# Patient Record
Sex: MALE | Race: WHITE | NOT HISPANIC OR LATINO | Employment: OTHER | URBAN - METROPOLITAN AREA
[De-identification: names, ages, dates, MRNs, and addresses within clinical notes are randomized per-mention and may not be internally consistent; named-entity substitution may affect disease eponyms.]

---

## 2017-02-28 ENCOUNTER — TELEPHONE (OUTPATIENT)
Dept: OTHER | Facility: CLINIC | Age: 67
End: 2017-02-28

## 2017-04-18 ENCOUNTER — OFFICE VISIT (OUTPATIENT)
Dept: FAMILY MEDICINE | Facility: CLINIC | Age: 67
End: 2017-04-18
Payer: COMMERCIAL

## 2017-04-18 VITALS
TEMPERATURE: 98.3 F | BODY MASS INDEX: 29.18 KG/M2 | HEIGHT: 66 IN | WEIGHT: 181.6 LBS | SYSTOLIC BLOOD PRESSURE: 125 MMHG | DIASTOLIC BLOOD PRESSURE: 80 MMHG | HEART RATE: 76 BPM

## 2017-04-18 DIAGNOSIS — L57.0 AK (ACTINIC KERATOSIS): ICD-10-CM

## 2017-04-18 DIAGNOSIS — I10 HYPERTENSION GOAL BP (BLOOD PRESSURE) < 140/90: Primary | ICD-10-CM

## 2017-04-18 PROBLEM — Z13.6 CARDIOVASCULAR SCREENING; LDL GOAL LESS THAN 160: Status: ACTIVE | Noted: 2017-04-18

## 2017-04-18 PROCEDURE — 99213 OFFICE O/P EST LOW 20 MIN: CPT | Mod: 25 | Performed by: FAMILY MEDICINE

## 2017-04-18 PROCEDURE — 17000 DESTRUCT PREMALG LESION: CPT | Performed by: FAMILY MEDICINE

## 2017-04-18 RX ORDER — LOSARTAN POTASSIUM 25 MG/1
25 TABLET ORAL DAILY
COMMUNITY
End: 2017-04-18

## 2017-04-18 RX ORDER — LOSARTAN POTASSIUM 25 MG/1
25 TABLET ORAL DAILY
Qty: 90 TABLET | Refills: 3 | Status: SHIPPED | OUTPATIENT
Start: 2017-04-18 | End: 2018-05-22

## 2017-04-18 ASSESSMENT — PAIN SCALES - GENERAL: PAINLEVEL: NO PAIN (0)

## 2017-04-18 NOTE — PROGRESS NOTES
SUBJECTIVE:                                                    Jaime Saucedo is a 66 year old male who presents to clinic today for the following health issues:    He has been doing well with losartan 25 mg daily.    He has a scaly small lesion on the left nose he wants checked.      Hypertension Follow-up      Outpatient blood pressures are not being checked.    Low Salt Diet: not monitoring salt       Amount of exercise or physical activity: None    Problems taking medications regularly: No    Medication side effects: none    Diet: regular (no restrictions) and breakfast skipped          Problem list and histories reviewed & adjusted, as indicated.  Additional history: as documented        Reviewed and updated as needed this visit by clinical staff  Tobacco  Allergies  Meds  Med Hx  Fam Hx       Reviewed and updated as needed this visit by Provider        Medical, surgical, family, social histories, allergies and meds reviewed and updated.    ROS:  General: No change in weight, sleep or appetite.  Normal energy.  No fever or chills  Resp: No coughing, wheezing or shortness of breath  CV: No chest pains or palpitations  GI: No nausea, vomiting,  heartburn, abdominal pain, diarrhea, constipation or change in bowel habits    Exam:  GENERAL APPEARANCE ADULT: Alert, no acute distress  NECK: No adenopathy,masses or thyromegaly  RESP: lungs clear to auscultation   CV: normal rate, regular rhythm, no murmur or gallop  ABDOMEN: soft, no organomegaly, masses or tenderness  SKIN: He has a 3 mm rough scaly spot on the lateral nose that was frozen repeatedly with liquid nitrogen.    ASSESSMENT:  (I10) Hypertension goal BP (blood pressure) < 140/90  (primary encounter diagnosis)  Comment: Stable and medications.  Plan: losartan (COZAAR) 25 MG tablet              PLAN:  Orders Placed This Encounter     DISCONTD: losartan (COZAAR) 25 MG tablet     losartan (COZAAR) 25 MG tablet   Actinic keratosis.    He is not excited  about his BMP or his Adacel. We will wait until next visit.    Patient Instructions         Thank you for choosing [facility].  You may be receiving a survey in the mail from [employer] [facility] regarding your visit today.  Please take a few minutes to complete and return the survey to let us know how we are doing.      Our Clinic hours are:  Mondays    7:20 am - 7 pm  Tues -  Fri  7:20 am - 5 pm    Clinic Phone: [phone]    The clinic lab opens at 7:30 am Mon - Fri and appointments are required.    [facility] [place]  Ph. [phone]  Monday-Thursday 8 am - 7pm  Tues/Wed/Fri 8 am - 5:30 pm               Amari Gautam

## 2017-04-18 NOTE — MR AVS SNAPSHOT
After Visit Summary   4/18/2017    Jaime Saucedo    MRN: [id number]           Patient Information     Date Of Birth          1950        Visit Information        Provider Department      4/18/2017 9:40 AM Amari Gautam MD [facility]        Today's Diagnoses     Hypertension goal BP (blood pressure) < 140/90    -  1    AK (actinic keratosis)          Care Instructions          Thank you for choosing [facility].  You may be receiving a survey in the mail from [employer] regarding your visit today.  Please take a few minutes to complete and return the survey to let us know how we are doing.      Our Clinic hours are:  Mondays    7:20 am - 7 pm  Tues -  Fri  7:20 am - 5 pm    Clinic Phone: [phone]    The clinic lab opens at 7:30 am Mon - Fri and appointments are required.    [facility] [place]  Ph. [phone]  Monday-Thursday 8 am - 7pm  Tues/Wed/Fri 8 am - 5:30 pm               Follow-ups after your visit        Who to contact     If you have questions or need follow up information about today's clinic visit or your schedule please contact [facility] directly at [phone].  Normal or non-critical lab and imaging results will be communicated to you by MyChart, letter or phone within 4 business days after the clinic has received the results. If you do not hear from us within 7 days, please contact the clinic through MyChart or phone. If you have a critical or abnormal lab result, we will notify you by phone as soon as possible.  Submit refill requests through [employer] or call your pharmacy and they will forward the refill request to us. Please allow 3 business days for your refill to be completed.          Additional Information About Your Visit        [employer] Information     [employer] lets you send messages to your doctor, view your test results, renew your prescriptions, schedule appointments and more. To sign up, go to  "[email or web address] . Click on \"Log in\" on the left side of the screen, which will take you to the Welcome page. Then click on \"Sign up Now\" on the right side of the page.     You will be asked to enter the access code listed below, as well as some personal information. Please follow the directions to create your username and password.     Your access code is: [email or web address]  Expires: 2017  7:03 PM     Your access code will  in 90 days. If you need help or a new code, please call your [place] clinic or [phone].        Care EveryWhere ID     This is your Care EveryWhere ID. This could be used by other organizations to access your [place] medical records  [id number]        Your Vitals Were     Pulse Temperature Height BMI (Body Mass Index)          76 98.3  F (36.8  C) (Tympanic) 5' 5.75\" (1.67 m) 29.53 kg/m2         Blood Pressure from Last 3 Encounters:   17 125/80    Weight from Last 3 Encounters:   17 181 lb 9.6 oz (82.4 kg)              We Performed the Following     DESTRUCT PREMALIGNANT LESION, FIRST          Where to get your medicines      These medications were sent to [facility] [place], [place] - [address]  [address] [facility] [address]     Phone:  [phone]     losartan 25 MG tablet          Primary Care Provider    None Specified       No primary provider on file.        Thank you!     Thank you for choosing [facility]  for your care. Our goal is always to provide you with excellent care. Hearing back from our patients is one way we can continue to improve our services. Please take a few minutes to complete the written survey that you may receive in the mail after your visit with us. Thank you!             Your Updated Medication List - Protect others around you: Learn how to safely use, store and throw away your medicines at www.disposemymeds.org.          This list is accurate as of: 17  " 7:03 PM.  Always use your most recent med list.                   Brand Name Dispense Instructions for use    losartan 25 MG tablet    COZAAR    90 tablet    Take 1 tablet (25 mg) by mouth daily

## 2017-04-18 NOTE — NURSING NOTE
"Chief Complaint   Patient presents with     Hypertension     new patient. Here for med recheck and refill       Initial /80 (BP Location: Right arm, Patient Position: Chair, Cuff Size: Adult Regular)  Pulse 76  Temp 98.3  F (36.8  C) (Tympanic)  Ht 5' 5.75\" (1.67 m)  Wt 181 lb 9.6 oz (82.4 kg)  BMI 29.53 kg/m2 Estimated body mass index is 29.53 kg/(m^2) as calculated from the following:    Height as of this encounter: 5' 5.75\" (1.67 m).    Weight as of this encounter: 181 lb 9.6 oz (82.4 kg).  Medication Reconciliation: complete    "

## 2017-04-18 NOTE — PATIENT INSTRUCTIONS
Thank you for choosing [facility].  You may be receiving a survey in the mail from [facility] regarding your visit today.  Please take a few minutes to complete and return the survey to let us know how we are doing.      Our Clinic hours are:  Mondays    7:20 am - 7 pm  Tues -  Fri  7:20 am - 5 pm    Clinic Phone: [phone]    The clinic lab opens at 7:30 am Mon - Fri and appointments are required.    [facility]. [phone]  Monday-Thursday 8 am - 7pm  Tues/Wed/Fri 8 am - 5:30 pm

## 2017-05-25 ENCOUNTER — OFFICE VISIT (OUTPATIENT)
Dept: FAMILY MEDICINE | Facility: CLINIC | Age: 67
End: 2017-05-25
Payer: COMMERCIAL

## 2017-05-25 ENCOUNTER — TELEPHONE (OUTPATIENT)
Dept: FAMILY MEDICINE | Facility: CLINIC | Age: 67
End: 2017-05-25

## 2017-05-25 VITALS
HEIGHT: 66 IN | DIASTOLIC BLOOD PRESSURE: 82 MMHG | HEART RATE: 79 BPM | WEIGHT: 179.4 LBS | TEMPERATURE: 97.6 F | OXYGEN SATURATION: 96 % | BODY MASS INDEX: 28.83 KG/M2 | SYSTOLIC BLOOD PRESSURE: 116 MMHG
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
## 2017-05-25 PROCEDURE — 99213 OFFICE O/P EST LOW 20 MIN: CPT | Performed by: NURSE PRACTITIONER

## 2017-05-25 RX ORDER — DOXYCYCLINE 100 MG/1
200 CAPSULE ORAL ONCE
Qty: 2 CAPSULE | Refills: 0 | Status: SHIPPED | OUTPATIENT
Start: 2017-05-25 | End: 2017-05-25

## 2017-05-25 NOTE — TELEPHONE ENCOUNTER
Pt questions:   1) what does he do with the tick that he pulled off his back  Writer: please place the remains of the wood tick in a plastic bag and bring to clinic appt today.     2) what can I apply to the area of where the wood tick was attached, I already applied neosporin.   Writer: you can wash the area with warm soapy water    Pt verbalized understanding and had no further questions at this time.   Encounter closed.   Liz LOZADA RN

## 2017-05-25 NOTE — TELEPHONE ENCOUNTER
Pt already has an appt in [place] today and he wants to speak to a Clinic RN - He also left a message for Dr. Ty's careteam.

## 2017-05-25 NOTE — TELEPHONE ENCOUNTER
[facility] RN already spoke with pt and he has an appt @ [facility] today

## 2017-05-25 NOTE — TELEPHONE ENCOUNTER
Reason for Call:  Other appointment    Detailed comments: Patient wants an appointment TODAY to have a wood tick removed.  It is on his back.    Phone Number Patient can be reached at: Home number on file [phone] (home)    Best Time: asap    Can we leave a detailed message on this number? YES    Call taken on 5/25/2017 at 8:19 AM by Renetta Magana

## 2017-05-25 NOTE — PROGRESS NOTES
SUBJECTIVE:                                                    Jaime Saucedo is a 66 year old male who presents to clinic today for the following health issues:  Chief Complaint   Patient presents with     Tick Bite     found about midnight last night, 5/25/17     Concern - tick bite      Onset: patient found tick this a.m. About 12 a.m. Has not been on for more than 36 hrs however patient unable to confirm that.     Description:   Deer tick bite on back upper right side, red spot.  Patient reports some itching and burning     Intensity: mild    Progression of Symptoms:  Worsening, burning more     Accompanying Signs & Symptoms:  Patient reports he thinks the head is still imbedded in skin.       Previous history of similar problem:   None     Precipitating factors:   Worsened by: none     Alleviating factors:  Improved by: none        Therapies Tried and outcome: neosporin after pulled tick off.       -------------------------------------    Problem list and histories reviewed & adjusted, as indicated.  Additional history: as documented    Patient Active Problem List   Diagnosis     Hypertension goal BP (blood pressure) < 140/90     CARDIOVASCULAR SCREENING; LDL GOAL LESS THAN 160     History reviewed. No pertinent surgical history.    Social History   Substance Use Topics     Smoking status: Never Smoker     Smokeless tobacco: Not on file     Alcohol use Yes      Comment: rare     Family History   Problem Relation Age of Onset     Hypertension Mother      Hypertension Father            Reviewed and updated as needed this visit by clinical staff  Tobacco  Allergies  Med Hx  Surg Hx  Fam Hx  Soc Hx      Reviewed and updated as needed this visit by Provider         ROS:  Constitutional, HEENT, cardiovascular, pulmonary, GI, , musculoskeletal, neuro, skin, endocrine and psych systems are negative, except as otherwise noted.    OBJECTIVE:                                                    /82 (BP  "Location: Left arm, Patient Position: Chair, Cuff Size: Adult Regular)  Pulse 79  Temp 97.6  F (36.4  C) (Tympanic)  Ht 5' 5.75\" (1.67 m)  Wt 179 lb 6.4 oz (81.4 kg)  SpO2 96%  BMI 29.18 kg/m2  Body mass index is 29.18 kg/(m^2).  GENERAL: healthy, alert and no distress  NECK: no adenopathy, no asymmetry, masses, or scars and thyroid normal to palpation  RESP: lungs clear to auscultation - no rales, rhonchi or wheezes  CV: regular rate and rhythm, normal S1 S2, no S3 or S4, no murmur, click or rub,   MS: no gross musculoskeletal defects noted, no edema  SKIN: upper right side of back area- dime size erythema- no bulls eye rash    Diagnostic Test Results:  none      ASSESSMENT/PLAN:                                                      1. Tick bite, initial encounter  Will give prophylaxis dose of antibiotics   - doxycycline (VIBRAMYCIN) 100 MG capsule; Take 2 capsules (200 mg) by mouth once for 1 dose  Dispense: 2 capsule; Refill: 0        EBER Arriola [facility]  "

## 2017-05-25 NOTE — MR AVS SNAPSHOT
After Visit Summary   5/25/2017    Jaime Saucedo    MRN: [id number]           Patient Information     Date Of Birth          1950        Visit Information        Provider Department      5/25/2017 1:40 PM Emperatriz Delgado APRN [facility]        Today's Diagnoses     Tick bite, initial encounter    -  1      Care Instructions          Thank you for choosing [facility].  You may be receiving a survey in the mail from [facility] regarding your visit today.  Please take a few minutes to complete and return the survey to let us know how we are doing.      If you have questions or concerns, please contact us via [employer]igl or you can contact your care team at [phone].    Our Clinic hours are:  Monday 6:40 am  to 7:00 pm  Tuesday -Friday 6:40 am to 5:00 pm    The [place] outpatient lab hours are:  Monday - Friday 6:10 am to 4:45 pm  Saturdays 7:00 am to 11:00 am  Appointments are required, call [phone]    If you have clinical questions after hours or would like to schedule an appointment,  call the clinic at [phone].          Follow-ups after your visit        Who to contact     If you have questions or need follow up information about today's clinic visit or your schedule please contact [facility] directly at [phone].  Normal or non-critical lab and imaging results will be communicated to you by [employer], letter or phone within 4 business days after the clinic has received the results. If you do not hear from us within 7 days, please contact the clinic through [employer] or phone. If you have a critical or abnormal lab result, we will notify you by phone as soon as possible.  Submit refill requests through [employer] or call your pharmacy and they will forward the refill request to us. Please allow 3 business days for your refill to be completed.          Additional Information About Your Visit        [facility] Information     [employer] lets you send  "messages to your doctor, view your test results, renew your prescriptions, schedule appointments and more. To sign up, go to [email or web address] . Click on \"Log in\" on the left side of the screen, which will take you to the Welcome page. Then click on \"Sign up Now\" on the right side of the page.     You will be asked to enter the access code listed below, as well as some personal information. Please follow the directions to create your username and password.     Your access code is: [email or web address]  Expires: 2017  7:03 PM     Your access code will  in 90 days. If you need help or a new code, please call your [place] clinic or [phone].        Care EveryWhere ID     This is your Care EveryWhere ID. This could be used by other organizations to access your [place] medical records  [id number]        Your Vitals Were     Pulse Temperature Height Pulse Oximetry BMI (Body Mass Index)       79 97.6  F (36.4  C) (Tympanic) 5' 5.75\" (1.67 m) 96% 29.18 kg/m2        Blood Pressure from Last 3 Encounters:   17 116/82   17 125/80    Weight from Last 3 Encounters:   17 179 lb 6.4 oz (81.4 kg)   17 181 lb 9.6 oz (82.4 kg)              Today, you had the following     No orders found for display         Today's Medication Changes          These changes are accurate as of: 17  2:06 PM.  If you have any questions, ask your nurse or doctor.               Start taking these medicines.        Dose/Directions    doxycycline 100 MG capsule   Commonly known as:  VIBRAMYCIN   Used for:  Tick bite, initial encounter   Started by:  Emperatriz Delgado APRN CNP        Dose:  200 mg   Take 2 capsules (200 mg) by mouth once for 1 dose   Quantity:  2 capsule   Refills:  0            Where to get your medicines      These medications were sent to [facility] [address]  [address] [facility] [address]     Phone:  [phone]     doxycycline 100 MG capsule    "             Primary Care Provider    None Specified       No primary provider on file.        Thank you!     Thank you for choosing [facility]  for your care. Our goal is always to provide you with excellent care. Hearing back from our patients is one way we can continue to improve our services. Please take a few minutes to complete the written survey that you may receive in the mail after your visit with us. Thank you!             Your Updated Medication List - Protect others around you: Learn how to safely use, store and throw away your medicines at www.disposemymeds.org.          This list is accurate as of: 5/25/17  2:06 PM.  Always use your most recent med list.                   Brand Name Dispense Instructions for use    doxycycline 100 MG capsule    VIBRAMYCIN    2 capsule    Take 2 capsules (200 mg) by mouth once for 1 dose       losartan 25 MG tablet    COZAAR    90 tablet    Take 1 tablet (25 mg) by mouth daily

## 2017-05-25 NOTE — TELEPHONE ENCOUNTER
Reason for Call:  Other tick bite     Detailed comments: pt is calling because he has a tick bite and an apt this afternoon   Wanting to know what he needs to do till seen and with the tick     Phone Number Patient can be reached at: Home number on file [phone] (home)    Best Time: any     Can we leave a detailed message on this number? YES    Call taken on 5/25/2017 at 8:29 AM by Natalie Holbrook

## 2017-05-25 NOTE — PATIENT INSTRUCTIONS
Thank you for choosing [facility].  You may be receiving a survey in the mail from [employer] regarding your visit today.  Please take a few minutes to complete and return the survey to let us know how we are doing.      If you have questions or concerns, please contact us via [employer] or you can contact your care team at [phone].    Our Clinic hours are:  Monday 6:40 am  to 7:00 pm  Tuesday -Friday 6:40 am to 5:00 pm    The [place] outpatient lab hours are:  Monday - Friday 6:10 am to 4:45 pm  Saturdays 7:00 am to 11:00 am  Appointments are required, call [phone]    If you have clinical questions after hours or would like to schedule an appointment,  call the clinic at [phone].

## 2017-11-14 ENCOUNTER — OFFICE VISIT (OUTPATIENT)
Dept: FAMILY MEDICINE | Facility: CLINIC | Age: 67
End: 2017-11-14
Payer: COMMERCIAL

## 2017-11-14 VITALS
OXYGEN SATURATION: 98 % | WEIGHT: 186 LBS | TEMPERATURE: 96.4 F | SYSTOLIC BLOOD PRESSURE: 128 MMHG | HEART RATE: 92 BPM | BODY MASS INDEX: 30.25 KG/M2 | DIASTOLIC BLOOD PRESSURE: 89 MMHG

## 2017-11-14 DIAGNOSIS — G57.61 MORTON'S NEUROMA OF RIGHT FOOT: Primary | ICD-10-CM

## 2017-11-14 PROCEDURE — 99213 OFFICE O/P EST LOW 20 MIN: CPT | Performed by: FAMILY MEDICINE

## 2017-11-14 ASSESSMENT — PAIN SCALES - GENERAL: PAINLEVEL: MODERATE PAIN (5)

## 2017-11-14 NOTE — NURSING NOTE
"Chief Complaint   Patient presents with     Musculoskeletal Problem     pt has had right foot pain since June 2017 on and off think it may be from some shoes that he use to wear       Initial /89 (BP Location: Right arm, Patient Position: Chair, Cuff Size: Adult Large)  Pulse 92  Temp 96.4  F (35.8  C) (Tympanic)  Wt 186 lb (84.4 kg)  SpO2 98%  BMI 30.25 kg/m2 Estimated body mass index is 30.25 kg/(m^2) as calculated from the following:    Height as of 5/25/17: 5' 5.75\" (1.67 m).    Weight as of this encounter: 186 lb (84.4 kg).  Medication Reconciliation: complete   Aubree Dixon CMA       "

## 2017-11-14 NOTE — MR AVS SNAPSHOT
After Visit Summary   11/14/2017    Jaime Saucedo    MRN: [id number]           Patient Information     Date Of Birth          1950        Visit Information        Provider Department      11/14/2017 2:40 PM Amari Gautam MD [facility]        Today's Diagnoses     Marquez's neuroma of right foot    -  1       Follow-ups after your visit        Additional Services     ORTHO  REFERRAL       [facility] is referring you to the Orthopedic  Services at [facility].   Dont call him, he will call.    R/O Mortons Neuroma vs Metatasalgia  Dr. Hilliard      The  Representative will assist you in the coordination of your Orthopedic and Musculoskeletal Care as prescribed by your physician.    The  Representative will call you within 1 business day to help schedule your appointment, or you may contact the [facility] Representative at:    All areas ~ [phone]     Type of Referral : [facility]       Timeframe requested: Routine    Coverage of these services is subject to the terms and limitations of your health insurance plan.  Please call member services at your health plan with any benefit or coverage questions.      If X-rays, CT or MRI's have been performed, please contact the facility where they were done to arrange for , prior to your scheduled appointment.  Please bring this referral request to your appointment and present it to your specialist.                  Who to contact     If you have questions or need follow up information about today's clinic visit or your schedule please contact [facility] directly at [phone].  Normal or non-critical lab and imaging results will be communicated to you by MyChart, letter or phone within 4 business days after the clinic has received the results. If you do not hear from us within 7 days, please contact  "the clinic through [employer] or phone. If you have a critical or abnormal lab result, we will notify you by phone as soon as possible.  Submit refill requests through [employer] or call your pharmacy and they will forward the refill request to us. Please allow 3 business days for your refill to be completed.          Additional Information About Your Visit        [employer] Information     [employer] lets you send messages to your doctor, view your test results, renew your prescriptions, schedule appointments and more. To sign up, go to [email or web address] . Click on \"Log in\" on the left side of the screen, which will take you to the Welcome page. Then click on \"Sign up Now\" on the right side of the page.     You will be asked to enter the access code listed below, as well as some personal information. Please follow the directions to create your username and password.     Your access code is: [email or web address]  Expires: 2018  3:27 PM     Your access code will  in 90 days. If you need help or a new code, please call your [place] clinic or [phone].        Care EveryWhere ID     This is your Care EveryWhere ID. This could be used by other organizations to access your [place] medical records  [id number]        Your Vitals Were     Pulse Temperature Pulse Oximetry BMI (Body Mass Index)          92 96.4  F (35.8  C) (Tympanic) 98% 30.25 kg/m2         Blood Pressure from Last 3 Encounters:   17 128/89   17 116/82   17 125/80    Weight from Last 3 Encounters:   17 186 lb (84.4 kg)   17 179 lb 6.4 oz (81.4 kg)   17 181 lb 9.6 oz (82.4 kg)              We Performed the Following     ORTHO  REFERRAL        Primary Care Provider Office Phone # Fax #    Amari Gautam [phone] [address] [facility] [address]        Equal Access to Services     JESSICA DEY AH: Hadii justyn Hernandez, waaxda krystle, qaybta josiane hernandez, " naman koch taqueria das'aan ah. So [facility] [phone].    ATENCIÓN: Si habla teofilo, tiene a aranda disposición servicios gratuitos de asistencia lingüística. Albino al [phone].    We comply with applicable federal civil rights laws and [place] laws. We do not discriminate on the basis of race, color, national origin, age, disability, sex, sexual orientation, or gender identity.            Thank you!     Thank you for choosing [facility]  for your care. Our goal is always to provide you with excellent care. Hearing back from our patients is one way we can continue to improve our services. Please take a few minutes to complete the written survey that you may receive in the mail after your visit with us. Thank you!             Your Updated Medication List - Protect others around you: Learn how to safely use, store and throw away your medicines at www.disposemymeds.org.          This list is accurate as of: 11/14/17  3:27 PM.  Always use your most recent med list.                   Brand Name Dispense Instructions for use Diagnosis    losartan 25 MG tablet    COZAAR    90 tablet    Take 1 tablet (25 mg) by mouth daily    Hypertension goal BP (blood pressure) < 140/90

## 2017-11-14 NOTE — PROGRESS NOTES
SUBJECTIVE:   Jaime Saucedo is a 67 year old male who presents to clinic today for the following health issues:    5 months ago he had pain starting in the right forefoot between the second and third toes.  5 months ago he was ruled out to lying with a pair of shoes that had a defect with a lump right on this area of the shoe. The foot has never really healed since.      Joint Pain    Onset: 5 months    Description:   Location: right foot  Character: Sharp    Intensity: moderate, severe    Progression of Symptoms: intermittent    Accompanying Signs & Symptoms:  Other symptoms: numbness    History:   Previous similar pain: no       Precipitating factors:   Trauma or overuse: YES- possibly from some old shoes pt use to wear    Alleviating factors:  Improved by: rest/inactivity and heat    Therapies Tried and outcome: heat, rest, exercise              Problem list and histories reviewed & adjusted, as indicated.  Additional history: as documented    Patient Active Problem List   Diagnosis     Hypertension goal BP (blood pressure) < 140/90     CARDIOVASCULAR SCREENING; LDL GOAL LESS THAN 160     History reviewed. No pertinent surgical history.    Social History   Substance Use Topics     Smoking status: Never Smoker     Smokeless tobacco: Never Used     Alcohol use Yes      Comment: rare     Family History   Problem Relation Age of Onset     Hypertension Mother      Hypertension Father              Reviewed and updated as needed this visit by clinical staffTobacco  Allergies  Med Hx  Surg Hx  Fam Hx  Soc Hx      Reviewed and updated as needed this visit by Provider         ROS:  CONSTITUTIONAL:NEGATIVE for fever, chills, change in weight  MUSCULOSKELETAL: Right foot pain.    OBJECTIVE:     /89 (BP Location: Right arm, Patient Position: Chair, Cuff Size: Adult Large)  Pulse 92  Temp 96.4  F (35.8  C) (Tympanic)  Wt 186 lb (84.4 kg)  SpO2 98%  BMI 30.25 kg/m2  Body mass index is 30.25  kg/(m^2).  GENERAL: healthy, alert and no distress  MS: There is tenderness to palpation right between the  bases of the right second and third toes        ASSESSMENT/PLAN:               ICD-10-CM    1. 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        Amari Gautam MD  [facility]

## 2017-11-27 ENCOUNTER — OFFICE VISIT (OUTPATIENT)
Dept: FAMILY MEDICINE | Facility: CLINIC | Age: 67
End: 2017-11-27
Payer: COMMERCIAL

## 2017-11-27 VITALS
BODY MASS INDEX: 29.54 KG/M2 | HEIGHT: 66 IN | SYSTOLIC BLOOD PRESSURE: 134 MMHG | HEART RATE: 83 BPM | TEMPERATURE: 99 F | RESPIRATION RATE: 16 BRPM | WEIGHT: 183.8 LBS | DIASTOLIC BLOOD PRESSURE: 89 MMHG | OXYGEN SATURATION: 97 %

## 2017-11-27 DIAGNOSIS — J01.90 ACUTE SINUSITIS WITH COEXISTING CONDITION REQUIRING PROPHYLACTIC TREATMENT: Primary | ICD-10-CM

## 2017-11-27 PROCEDURE — 99213 OFFICE O/P EST LOW 20 MIN: CPT | Performed by: NURSE PRACTITIONER

## 2017-11-27 RX ORDER — FLUTICASONE PROPIONATE 50 MCG
1-2 SPRAY, SUSPENSION (ML) NASAL DAILY
Qty: 16 G | Refills: 0 | Status: SHIPPED | OUTPATIENT
Start: 2017-11-27 | End: 2018-06-21

## 2017-11-27 NOTE — MR AVS SNAPSHOT
After Visit Summary   11/27/2017    Jaime Saucedo    MRN: [id number]           Patient Information     Date Of Birth          1950        Visit Information        Provider Department      11/27/2017 4:40 PM JESSE SAME DAY PROVIDER [facility]        Today's Diagnoses     Acute sinusitis with coexisting condition requiring prophylactic treatment    -  1      Care Instructions    Augmentin 875 bid for 10 days was prescribed.  Symptom Relief: Afdrin do not use greater then 3 days  Intranasal corticosteroid   Flonase has  been prescribed.     Tylenol or Ibuprofen if able to take for fevers and discomfort.  Do not exceed 4 grams of Tylenol in a 24 hour period.  Take Ibuprofen with food.      Follow up in 3-5 days if not improving or return sooner if worsening or fail to improve as anticipated.      Sinusitis (Antibiotic Treatment)    The sinuses are air-filled spaces within the bones of the face. They connect to the inside of the nose. Sinusitis is an inflammation of the tissue lining the sinus cavity. Sinus inflammation can occur during a cold. It can also be due to allergies to pollens and other particles in the air. Sinusitis can cause symptoms of sinus congestion and fullness. A sinus infection causes fever, headache and facial pain. There is often green or yellow drainage from the nose or into the back of the throat (post-nasal drip). You have been given antibiotics to treat this condition.  Home care:    Take the full course of antibiotics as instructed. Do not stop taking them, even if you feel better.    Drink plenty of water, hot tea, and other liquids. This may help thin mucus. It also may promote sinus drainage.    Heat may help soothe painful areas of the face. Use a towel soaked in hot water. Or,  the shower and direct the hot spray onto your face. Using a vaporizer along with a menthol rub at night may also help.     An expectorant containing guaifenesin may  help thin the mucus and promote drainage from the sinuses.    Over-the-counter decongestants may be used unless a similar medicine was prescribed. Nasal sprays work the fastest. Use one that contains phenylephrine or oxymetazoline. First blow the nose gently. Then use the spray. Do not use these medicines more often than directed on the label or symptoms may get worse. You may also use tablets containing pseudoephedrine. Avoid products that combine ingredients, because side effects may be increased. Read labels. You can also ask the pharmacist for help. (NOTE: Persons with high blood pressure should not use decongestants. They can raise blood pressure.)    Over-the-counter antihistamines may help if allergies contributed to your sinusitis.      Do not use nasal rinses or irrigation during an acute sinus infection, unless told to by your health care provider. Rinsing may spread the infection to other sinuses.    Use acetaminophen or ibuprofen to control pain, unless another pain medicine was prescribed. (If you have chronic liver or kidney disease or ever had a stomach ulcer, talk with your doctor before using these medicines. Aspirin should never be used in anyone under 18 years of age who is ill with a fever. It may cause severe liver damage.)    Don't smoke. This can worsen symptoms.  Follow-up care  Follow up with your healthcare provider or our staff if you are not improving within the next week.  When to seek medical advice  Call your healthcare provider if any of these occur:    Facial pain or headache becoming more severe    Stiff neck    Unusual drowsiness or confusion    Swelling of the forehead or eyelids    Vision problems, including blurred or double vision    Fever of 100.4 F (38 C) or higher, or as directed by your healthcare provider    Seizure    Breathing problems    Symptoms not resolving within 10 days  Date Last Reviewed: 4/13/2015 2000-2017 [employer] [address] [facility],  "[place] [address]. All rights reserved. This information is not intended as a substitute for professional medical care. Always follow your healthcare professional's instructions.                Follow-ups after your visit        Your next 10 appointments already scheduled     Nov 27, 2017  4:40 PM CST   SHORT with JESSE SAME DAY PROVIDER   [facility] ([facility]    [address] [place] [address]   [phone]            Nov 30, 2017  9:40 AM CST   New Visit with Manuel Hilliard DPM   [facility] ([facility])    [address] [facility]  [address]  [facility] [address]   [phone]              Who to contact     If you have questions or need follow up information about today's clinic visit or your schedule please contact [facility] directly at [phone].  Normal or non-critical lab and imaging results will be communicated to you by MyChart, letter or phone within 4 business days after the clinic has received the results. If you do not hear from us within 7 days, please contact the clinic through [employer] or phone. If you have a critical or abnormal lab result, we will notify you by phone as soon as possible.  Submit refill requests through [employer] or call your pharmacy and they will forward the refill request to us. Please allow 3 business days for your refill to be completed.          Additional Information About Your Visit        MyChart Information     [employer] lets you send messages to your doctor, view your test results, renew your prescriptions, schedule appointments and more. To sign up, go to [email or web address] . Click on \"Log in\" on the left side of the screen, which will take you to the Welcome page. Then click on \"Sign up Now\" on the right side of the page.     You will be asked to enter the access code listed below, as well as some personal information. Please follow the " "directions to create your username and password.     Your access code is: [email or web address]  Expires: 2018  3:27 PM     Your access code will  in 90 days. If you need help or a new code, please call your [place] clinic or [phone].        Care EveryWhere ID     This is your Care EveryWhere ID. This could be used by other organizations to access your [place] medical records  [id number]        Your Vitals Were     Pulse Temperature Respirations Height Pulse Oximetry BMI (Body Mass Index)    83 99  F (37.2  C) (Tympanic) 16 5' 5.5\" (1.664 m) 97% 30.12 kg/m2       Blood Pressure from Last 3 Encounters:   17 134/89   17 128/89   17 116/82    Weight from Last 3 Encounters:   17 183 lb 12.8 oz (83.4 kg)   17 186 lb (84.4 kg)   17 179 lb 6.4 oz (81.4 kg)              Today, you had the following     No orders found for display         Today's Medication Changes          These changes are accurate as of: 17  4:38 PM.  If you have any questions, ask your nurse or doctor.               Start taking these medicines.        Dose/Directions    amoxicillin-clavulanate 875-125 MG per tablet   Commonly known as:  AUGMENTIN   Used for:  Acute sinusitis with coexisting condition requiring prophylactic treatment        Dose:  1 tablet   Take 1 tablet by mouth 2 times daily   Quantity:  20 tablet   Refills:  0       fluticasone 50 MCG/ACT spray   Commonly known as:  FLONASE   Used for:  Acute sinusitis with coexisting condition requiring prophylactic treatment        Dose:  1-2 spray   Spray 1-2 sprays into both nostrils daily   Quantity:  16 g   Refills:  0            Where to get your medicines      These medications were sent to [facility] [address] [address]     Phone:  [phone]     amoxicillin-clavulanate 875-125 MG per tablet    fluticasone 50 MCG/ACT spray                Primary Care Provider " Office Phone # Fax #    Amari Angel Gautam [phone] [address] [address] [facility] [address]        Equal Access to Services     FREDMARISSA TIBURCIO : Hadii aad ku haddellike Sojose luis, wataylorda luqjeannie, qaybta kagilbertoda david, naman resendiz lanoywood pugh. So [facility] [phone].    ATENCIÓN: Si habla español, tiene a aranda disposición servicios gratuitos de asistencia lingüística. Llame al [phone].    We comply with applicable federal civil rights laws and [place] laws. We do not discriminate on the basis of race, color, national origin, age, disability, sex, sexual orientation, or gender identity.            Thank you!     Thank you for choosing [facility]  for your care. Our goal is always to provide you with excellent care. Hearing back from our patients is one way we can continue to improve our services. Please take a few minutes to complete the written survey that you may receive in the mail after your visit with us. Thank you!             Your Updated Medication List - Protect others around you: Learn how to safely use, store and throw away your medicines at www.disposemymeds.org.          This list is accurate as of: 11/27/17  4:38 PM.  Always use your most recent med list.                   Brand Name Dispense Instructions for use Diagnosis    amoxicillin-clavulanate 875-125 MG per tablet    AUGMENTIN    20 tablet    Take 1 tablet by mouth 2 times daily    Acute sinusitis with coexisting condition requiring prophylactic treatment       fluticasone 50 MCG/ACT spray    FLONASE    16 g    Spray 1-2 sprays into both nostrils daily    Acute sinusitis with coexisting condition requiring prophylactic treatment       losartan 25 MG tablet    COZAAR    90 tablet    Take 1 tablet (25 mg) by mouth daily    Hypertension goal BP (blood pressure) < 140/90

## 2017-11-27 NOTE — PATIENT INSTRUCTIONS
Augmentin 875 bid for 10 days was prescribed.  Symptom Relief: Afdrin do not use greater then 3 days  Intranasal corticosteroid   Flonase has  been prescribed.     Tylenol or Ibuprofen if able to take for fevers and discomfort.  Do not exceed 4 grams of Tylenol in a 24 hour period.  Take Ibuprofen with food.      Follow up in 3-5 days if not improving or return sooner if worsening or fail to improve as anticipated.      Sinusitis (Antibiotic Treatment)    The sinuses are air-filled spaces within the bones of the face. They connect to the inside of the nose. Sinusitis is an inflammation of the tissue lining the sinus cavity. Sinus inflammation can occur during a cold. It can also be due to allergies to pollens and other particles in the air. Sinusitis can cause symptoms of sinus congestion and fullness. A sinus infection causes fever, headache and facial pain. There is often green or yellow drainage from the nose or into the back of the throat (post-nasal drip). You have been given antibiotics to treat this condition.  Home care:    Take the full course of antibiotics as instructed. Do not stop taking them, even if you feel better.    Drink plenty of water, hot tea, and other liquids. This may help thin mucus. It also may promote sinus drainage.    Heat may help soothe painful areas of the face. Use a towel soaked in hot water. Or,  the shower and direct the hot spray onto your face. Using a vaporizer along with a menthol rub at night may also help.     An expectorant containing guaifenesin may help thin the mucus and promote drainage from the sinuses.    Over-the-counter decongestants may be used unless a similar medicine was prescribed. Nasal sprays work the fastest. Use one that contains phenylephrine or oxymetazoline. First blow the nose gently. Then use the spray. Do not use these medicines more often than directed on the label or symptoms may get worse. You may also use tablets containing  pseudoephedrine. Avoid products that combine ingredients, because side effects may be increased. Read labels. You can also ask the pharmacist for help. (NOTE: Persons with high blood pressure should not use decongestants. They can raise blood pressure.)    Over-the-counter antihistamines may help if allergies contributed to your sinusitis.      Do not use nasal rinses or irrigation during an acute sinus infection, unless told to by your health care provider. Rinsing may spread the infection to other sinuses.    Use acetaminophen or ibuprofen to control pain, unless another pain medicine was prescribed. (If you have chronic liver or kidney disease or ever had a stomach ulcer, talk with your doctor before using these medicines. Aspirin should never be used in anyone under 18 years of age who is ill with a fever. It may cause severe liver damage.)    Don't smoke. This can worsen symptoms.  Follow-up care  Follow up with your healthcare provider or our staff if you are not improving within the next week.  When to seek medical advice  Call your healthcare provider if any of these occur:    Facial pain or headache becoming more severe    Stiff neck    Unusual drowsiness or confusion    Swelling of the forehead or eyelids    Vision problems, including blurred or double vision    Fever of 100.4 F (38 C) or higher, or as directed by your healthcare provider    Seizure    Breathing problems    Symptoms not resolving within 10 days  Date Last Reviewed: 4/13/2015 2000-2017 [employer]. [address] [place] [address], [place] [address]. All rights reserved. This information is not intended as a substitute for professional medical care. Always follow your healthcare professional's instructions.

## 2017-11-27 NOTE — PROGRESS NOTES
"  SUBJECTIVE:   Jaime Saucedo is a 67 year old male who presents to clinic today for the following health issues:      Sinus problem       Duration: Wednesday night     Description (location/character/radiation): sinus pressure     Intensity:  mild, moderate    Accompanying signs and symptoms: low grade fever consistently, chills/sweats, some ear pain, tinnitus, headaches, sore throat down into larynx, mucus at night, coughing- productive, congestion, raw pain in sinuses, fatigue     History (similar episodes/previous evaluation): some history of sinus infections     Precipitating or alleviating factors: None    Therapies tried and outcome: April seltzer plus, angelic pot        Problem list and histories reviewed & adjusted, as indicated.  Additional history: as documented    Patient Active Problem List   Diagnosis     Hypertension goal BP (blood pressure) < 140/90     CARDIOVASCULAR SCREENING; LDL GOAL LESS THAN 160     History reviewed. No pertinent surgical history.    Social History   Substance Use Topics     Smoking status: Never Smoker     Smokeless tobacco: Never Used     Alcohol use Yes      Comment: rare     Family History   Problem Relation Age of Onset     Hypertension Mother      Hypertension Father          Current Outpatient Prescriptions   Medication Sig Dispense Refill     losartan (COZAAR) 25 MG tablet Take 1 tablet (25 mg) by mouth daily 90 tablet 3     No Known Allergies      Reviewed and updated as needed this visit by clinical staffTobacco  Allergies  Meds  Med Hx  Surg Hx  Fam Hx  Soc Hx      Reviewed and updated as needed this visit by Provider         ROS:  Constitutional, HEENT, cardiovascular, pulmonary, gi and gu systems are negative, except as otherwise noted.      OBJECTIVE:   /89  Pulse 83  Temp 99  F (37.2  C) (Tympanic)  Resp 16  Ht 5' 5.5\" (1.664 m)  Wt 183 lb 12.8 oz (83.4 kg)  SpO2 97%  BMI 30.12 kg/m2  Body mass index is 30.12 kg/(m^2).  GENERAL: healthy, alert " and no distress  EYES: Eyes grossly normal to inspection, PERRL and conjunctivae and sclerae normal  HENT: ear canals and TM's normal, mouth without ulcers or lesions  HENT: Maxillary sinus tenderness, bilateral turbinates inflamed and edematous    NECK: no adenopathy, no asymmetry, masses, or scars and thyroid normal to palpation  RESP: lungs clear to auscultation - no rales, rhonchi or wheezes  CV: regular rate and rhythm, normal S1 S2, no S3 or S4, no murmur, click or rub, no peripheral edema and peripheral pulses strong  MS: no gross musculoskeletal defects noted, no edema  SKIN: no suspicious lesions or rashes  NEURO: Normal strength and tone, mentation intact and speech normal  PSYCH: mentation appears normal, affect normal/bright      ASSESSMENT/PLAN:       ICD-10-CM    1. Acute sinusitis with coexisting condition requiring prophylactic treatment J01.90 fluticasone (FLONASE) 50 MCG/ACT spray     amoxicillin-clavulanate (AUGMENTIN) 875-125 MG per tablet     Patient Instructions   Augmentin 875 bid for 10 days was prescribed.  Symptom Relief: Afdrin do not use greater then 3 days  Intranasal corticosteroid   Flonase has  been prescribed.     Tylenol or Ibuprofen if able to take for fevers and discomfort.  Do not exceed 4 grams of Tylenol in a 24 hour period.  Take Ibuprofen with food.      Follow up in 3-5 days if not improving or return sooner if worsening or fail to improve as anticipated.      Sinusitis (Antibiotic Treatment)    The sinuses are air-filled spaces within the bones of the face. They connect to the inside of the nose. Sinusitis is an inflammation of the tissue lining the sinus cavity. Sinus inflammation can occur during a cold. It can also be due to allergies to pollens and other particles in the air. Sinusitis can cause symptoms of sinus congestion and fullness. A sinus infection causes fever, headache and facial pain. There is often green or yellow drainage from the nose or into the back of  the throat (post-nasal drip). You have been given antibiotics to treat this condition.  Home care:    Take the full course of antibiotics as instructed. Do not stop taking them, even if you feel better.    Drink plenty of water, hot tea, and other liquids. This may help thin mucus. It also may promote sinus drainage.    Heat may help soothe painful areas of the face. Use a towel soaked in hot water. Or,  the shower and direct the hot spray onto your face. Using a vaporizer along with a menthol rub at night may also help.     An expectorant containing guaifenesin may help thin the mucus and promote drainage from the sinuses.    Over-the-counter decongestants may be used unless a similar medicine was prescribed. Nasal sprays work the fastest. Use one that contains phenylephrine or oxymetazoline. First blow the nose gently. Then use the spray. Do not use these medicines more often than directed on the label or symptoms may get worse. You may also use tablets containing pseudoephedrine. Avoid products that combine ingredients, because side effects may be increased. Read labels. You can also ask the pharmacist for help. (NOTE: Persons with high blood pressure should not use decongestants. They can raise blood pressure.)    Over-the-counter antihistamines may help if allergies contributed to your sinusitis.      Do not use nasal rinses or irrigation during an acute sinus infection, unless told to by your health care provider. Rinsing may spread the infection to other sinuses.    Use acetaminophen or ibuprofen to control pain, unless another pain medicine was prescribed. (If you have chronic liver or kidney disease or ever had a stomach ulcer, talk with your doctor before using these medicines. Aspirin should never be used in anyone under 18 years of age who is ill with a fever. It may cause severe liver damage.)    Don't smoke. This can worsen symptoms.  Follow-up care  Follow up with your healthcare provider or  our staff if you are not improving within the next week.  When to seek medical advice  Call your healthcare provider if any of these occur:    Facial pain or headache becoming more severe    Stiff neck    Unusual drowsiness or confusion    Swelling of the forehead or eyelids    Vision problems, including blurred or double vision    Fever of 100.4 F (38 C) or higher, or as directed by your healthcare provider    Seizure    Breathing problems    Symptoms not resolving within 10 days  Date Last Reviewed: 4/13/2015 2000-2017 [employer]. [address] [place] [address]. All rights reserved. This information is not intended as a substitute for professional medical care. Always follow your healthcare professional's instructions.          Dari MOLINA SAME DAY PROVIDER  [facility]

## 2017-11-27 NOTE — NURSING NOTE
"Chief Complaint   Patient presents with     Sinus Problem       Initial /89  Pulse 83  Temp 99  F (37.2  C) (Tympanic)  Resp 16  Ht 5' 5.5\" (1.664 m)  Wt 183 lb 12.8 oz (83.4 kg)  SpO2 97%  BMI 30.12 kg/m2 Estimated body mass index is 30.12 kg/(m^2) as calculated from the following:    Height as of this encounter: 5' 5.5\" (1.664 m).    Weight as of this encounter: 183 lb 12.8 oz (83.4 kg).  Medication Reconciliation: complete    Health Maintenance that is potentially due pending provider review:  NONE    n/a    Is there anyone who you would like to be able to receive your results? No  If yes have patient fill out SARABJIT      "

## 2017-11-30 ENCOUNTER — OFFICE VISIT (OUTPATIENT)
Dept: PODIATRY | Facility: CLINIC | Age: 67
End: 2017-11-30
Payer: COMMERCIAL

## 2017-11-30 VITALS — HEIGHT: 66 IN | HEART RATE: 80 BPM | WEIGHT: 183 LBS | BODY MASS INDEX: 29.41 KG/M2

## 2017-11-30 DIAGNOSIS — M77.8 CAPSULITIS OF RIGHT FOOT: Primary | ICD-10-CM

## 2017-11-30 PROCEDURE — 99203 OFFICE O/P NEW LOW 30 MIN: CPT | Performed by: PODIATRIST

## 2017-11-30 NOTE — PROGRESS NOTES
"PATIENT HISTORY:  Jaime Saucedo is a 67 year old male who presents to clinic for a painful right foot .  The patient describes the pain as sharp stabbing.  The patient relates the pain level is moderate.  The patient relates pain is located in the ball of the right foot.  The patient relates the pain has been present for the past several weeks to months.  The patient relates pain with ambulation.  The patient has tried different shoes with little relief.       REVIEW OF SYSTEMS:  Constitutional, HEENT, cardiovascular, pulmonary, GI, , musculoskeletal, neuro, skin, endocrine and psych systems are negative, except as otherwise noted.     PAST MEDICAL HISTORY: No past medical history on file.     PAST SURGICAL HISTORY: No past surgical history on file.     MEDICATIONS:   Current Outpatient Prescriptions:      order for DME, Equipment being ordered: Dynaflex insert, Disp: 1 Units, Rfl: 0     fluticasone (FLONASE) 50 MCG/ACT spray, Spray 1-2 sprays into both nostrils daily, Disp: 16 g, Rfl: 0     amoxicillin-clavulanate (AUGMENTIN) 875-125 MG per tablet, Take 1 tablet by mouth 2 times daily, Disp: 20 tablet, Rfl: 0     losartan (COZAAR) 25 MG tablet, Take 1 tablet (25 mg) by mouth daily, Disp: 90 tablet, Rfl: 3     ALLERGIES:  No Known Allergies     SOCIAL HISTORY:   Social History     Social History     Marital status:      Spouse name: N/A     Number of children: N/A     Years of education: N/A     Occupational History     Not on file.     Social History Main Topics     Smoking status: Never Smoker     Smokeless tobacco: Never Used     Alcohol use Yes      Comment: rare     Drug use: No     Sexual activity: No     Other Topics Concern     Not on file     Social History Narrative        FAMILY HISTORY:   Family History   Problem Relation Age of Onset     Hypertension Mother      Hypertension Father         EXAM:Vitals: Pulse 80  Ht 5' 5.5\" (1.664 m)  Wt 183 lb (83 kg)  BMI 29.99 kg/m2  BMI= Body mass index " is 29.99 kg/(m^2).          General appearance: Patient is alert and fully cooperative with history & exam.  No sign of distress is noted during the visit.     Psychiatric: Affect is pleasant & appropriate.  Patient appears motivated to improve health.     Respiratory: Breathing is regular & unlabored while sitting.     HEENT: Hearing is intact to spoken word.  Speech is clear.  No gross evidence of visual impairment that would impact ambulation.     Dermatologic: Skin is intact to both lower extremities without significant lesions, rash or abrasion.  No paronychia or evidence of soft tissue infection is noted.     Vascular: DP & PT pulses are intact & regular bilaterally.  No significant edema or varicosities noted.  CFT and skin temperature is normal to both lower extremities.     Neurologic: Lower extremity sensation is intact to light touch.  No evidence of weakness or contracture in the lower extremities.  No evidence of neuropathy.     Musculoskeletal: Patient is ambulatory without assistive device or brace.  No gross ankle deformity noted.  No foot or ankle joint effusion is noted.  No pain on palpation on the plantar aspect of the second metatarsophalangeal joint on the right foot. One notes a positive Lachman's test of the second toe of the right foot. No surrounding erythema noted.       ASSESSMENT / PLAN:     ICD-10-CM    1. Capsulitis of right foot M77.51 order for DME       I have explained to Jaime  about the conditions.  We discussed the nature of the condition as well as the treatment plan and expected length of recovery.  At this time, the patient was instructed on icing, stretching, tissue massage and support.  The patient was fitted with a Dynaflex insert that will aid in offloading the tension forces to the soft tissues and prevent further inflammation.   The patient will return in four weeks for reevaluation if the symptoms do not resolve.        Disclaimer: This note consists of symbols derived  from keyboarding, dictation and/or voice recognition software. As a result, there may be errors in the script that have gone undetected. Please consider this when interpreting information found in this chart.       BENEDICTO Hilliard D.P.M., GLENN.F.A.S.

## 2017-11-30 NOTE — MR AVS SNAPSHOT
After Visit Summary   11/30/2017    Jaime Saucedo    MRN: [id number]           Patient Information     Date Of Birth          1950        Visit Information        Provider Department      11/30/2017 9:40 AM Manuel Hilliard DPM [facility]        Today's Diagnoses     Capsulitis of right foot    -  1      Care Instructions    Initial musculoskeletal treatment recommendation:    1.  Stretch the calf muscles as instructed once an hour.  2.  Ice the injured area in the evening; 20 min on/off.  3.  Take antiinflammatory medication as directed.  4.  Massage the soft tissues around the injured area in the morning to loosen the tissue  5.  Wear supportive foot wear and/or arch supports (rigid not cushion).      If no improvement in symptoms within four to six weeks, return to clinic for reevaluation.            Follow-ups after your visit        Follow-up notes from your care team     Return in about 4 weeks (around 12/28/2017), or if symptoms worsen or fail to improve.      Who to contact     If you have questions or need follow up information about today's clinic visit or your schedule please contact [facility] directly at [phone].  Normal or non-critical lab and imaging results will be communicated to you by Localminthart, letter or phone within 4 business days after the clinic has received the results. If you do not hear from us within 7 days, please contact the clinic through [employer] or phone. If you have a critical or abnormal lab result, we will notify you by phone as soon as possible.  Submit refill requests through [employer] or call your pharmacy and they will forward the refill request to us. Please allow 3 business days for your refill to be completed.          Additional Information About Your Visit        MyChart Information     [employer] lets you send messages to your doctor, view your test results, renew your prescriptions,  "schedule appointments and more. To sign up, go to [email or web address] . Click on \"Log in\" on the left side of the screen, which will take you to the Welcome page. Then click on \"Sign up Now\" on the right side of the page.     You will be asked to enter the access code listed below, as well as some personal information. Please follow the directions to create your username and password.     Your access code is: [email or web address]  Expires: 2018  3:27 PM     Your access code will  in 90 days. If you need help or a new code, please call your [facility] clinic or [phone].        Care EveryWhere ID     This is your Care EveryWhere ID. This could be used by other organizations to access your [facility] medical records  [id number]        Your Vitals Were     Pulse Height BMI (Body Mass Index)             80 5' 5.5\" (1.664 m) 29.99 kg/m2          Blood Pressure from Last 3 Encounters:   17 134/89   17 128/89   17 116/82    Weight from Last 3 Encounters:   17 183 lb (83 kg)   17 183 lb 12.8 oz (83.4 kg)   17 186 lb (84.4 kg)              Today, you had the following     No orders found for display         Today's Medication Changes          These changes are accurate as of: 17  9:59 AM.  If you have any questions, ask your nurse or doctor.               Start taking these medicines.        Dose/Directions    order for DME   Used for:  Capsulitis of right foot   Started by:  Manuel Hilliard DPM        Equipment being ordered: Dynaflex insert   Quantity:  1 Units   Refills:  0            Where to get your medicines      Some of these will need a paper prescription and others can be bought over the counter.  Ask your nurse if you have questions.     Bring a paper prescription for each of these medications     order for DME                Primary Care Provider Office Phone # Fax #    Amari Angel Gautam [phone] [address] [place] [address]        Equal Access to Services     [facility] : Hadii justyn eldridge maynor Hernandez, wataylorda luqadaha, qaybta kaalmada taqueriacodycori, waxioana jason worthingtoncoltjimi pugh. So [facility] [phone].    ATENCIÓN: Si habla español, tiene a aranda disposición servicios gratuitos de asistencia lingüística. [facility] [phone].    We comply with applicable federal civil rights laws and [place] laws. We do not discriminate on the basis of race, color, national origin, age, disability, sex, sexual orientation, or gender identity.            Thank you!     Thank you for choosing [facility]  for your care. Our goal is always to provide you with excellent care. Hearing back from our patients is one way we can continue to improve our services. Please take a few minutes to complete the written survey that you may receive in the mail after your visit with us. Thank you!             Your Updated Medication List - Protect others around you: Learn how to safely use, store and throw away your medicines at www.disposemymeds.org.          This list is accurate as of: 11/30/17  9:59 AM.  Always use your most recent med list.                   Brand Name Dispense Instructions for use Diagnosis    amoxicillin-clavulanate 875-125 MG per tablet    AUGMENTIN    20 tablet    Take 1 tablet by mouth 2 times daily    Acute sinusitis with coexisting condition requiring prophylactic treatment       fluticasone 50 MCG/ACT spray    FLONASE    16 g    Spray 1-2 sprays into both nostrils daily    Acute sinusitis with coexisting condition requiring prophylactic treatment       losartan 25 MG tablet    COZAAR    90 tablet    Take 1 tablet (25 mg) by mouth daily    Hypertension goal BP (blood pressure) < 140/90       order for DME     1 Units    Equipment being ordered: Dynaflex insert    Capsulitis of right foot

## 2017-11-30 NOTE — NURSING NOTE
"Chief Complaint   Patient presents with     Consult     Beverly Neuroma Right foot pain       Initial Pulse 80  Ht 5' 5.5\" (1.664 m)  Wt 183 lb (83 kg)  BMI 29.99 kg/m2 Estimated body mass index is 29.99 kg/(m^2) as calculated from the following:    Height as of this encounter: 5' 5.5\" (1.664 m).    Weight as of this encounter: 183 lb (83 kg).  Medication Reconciliation: complete     Zulema Caal MA        "

## 2017-11-30 NOTE — LETTER
11/30/2017         RE: Jaime Sauceod  [address] [facility] [address]        Dear Colleague,    Thank you for referring your patient, Jaime Saucedo, to the [place] [facility]. Please see a copy of my visit note below.    PATIENT HISTORY:  Jaime Saucedo is a 67 year old male who presents to clinic for a painful right foot .  The patient describes the pain as sharp stabbing.  The patient relates the pain level is moderate.  The patient relates pain is located in the ball of the right foot.  The patient relates the pain has been present for the past several weeks to months.  The patient relates pain with ambulation.  The patient has tried different shoes with little relief.       REVIEW OF SYSTEMS:  Constitutional, HEENT, cardiovascular, pulmonary, GI, , musculoskeletal, neuro, skin, endocrine and psych systems are negative, except as otherwise noted.     PAST MEDICAL HISTORY: No past medical history on file.     PAST SURGICAL HISTORY: No past surgical history on file.     MEDICATIONS:   Current Outpatient Prescriptions:      order for DME, Equipment being ordered: Dynaflex insert, Disp: 1 Units, Rfl: 0     fluticasone (FLONASE) 50 MCG/ACT spray, Spray 1-2 sprays into both nostrils daily, Disp: 16 g, Rfl: 0     amoxicillin-clavulanate (AUGMENTIN) 875-125 MG per tablet, Take 1 tablet by mouth 2 times daily, Disp: 20 tablet, Rfl: 0     losartan (COZAAR) 25 MG tablet, Take 1 tablet (25 mg) by mouth daily, Disp: 90 tablet, Rfl: 3     ALLERGIES:  No Known Allergies     SOCIAL HISTORY:   Social History     Social History     Marital status:      Spouse name: N/A     Number of children: N/A     Years of education: N/A     Occupational History     Not on file.     Social History Main Topics     Smoking status: Never Smoker     Smokeless tobacco: Never Used     Alcohol use Yes      Comment: rare     Drug use: No     Sexual activity: No     Other Topics Concern     Not on  "file     Social History Narrative        FAMILY HISTORY:   Family History   Problem Relation Age of Onset     Hypertension Mother      Hypertension Father         EXAM:Vitals: Pulse 80  Ht 5' 5.5\" (1.664 m)  Wt 183 lb (83 kg)  BMI 29.99 kg/m2  BMI= Body mass index is 29.99 kg/(m^2).          General appearance: Patient is alert and fully cooperative with history & exam.  No sign of distress is noted during the visit.     Psychiatric: Affect is pleasant & appropriate.  Patient appears motivated to improve health.     Respiratory: Breathing is regular & unlabored while sitting.     HEENT: Hearing is intact to spoken word.  Speech is clear.  No gross evidence of visual impairment that would impact ambulation.     Dermatologic: Skin is intact to both lower extremities without significant lesions, rash or abrasion.  No paronychia or evidence of soft tissue infection is noted.     Vascular: DP & PT pulses are intact & regular bilaterally.  No significant edema or varicosities noted.  CFT and skin temperature is normal to both lower extremities.     Neurologic: Lower extremity sensation is intact to light touch.  No evidence of weakness or contracture in the lower extremities.  No evidence of neuropathy.     Musculoskeletal: Patient is ambulatory without assistive device or brace.  No gross ankle deformity noted.  No foot or ankle joint effusion is noted.  No pain on palpation on the plantar aspect of the second metatarsophalangeal joint on the right foot. One notes a positive Lachman's test of the second toe of the right foot. No surrounding erythema noted.       ASSESSMENT / PLAN:     ICD-10-CM    1. Capsulitis of right foot M77.51 order for DME       I have explained to Jaime  about the conditions.  We discussed the nature of the condition as well as the treatment plan and expected length of recovery.  At this time, the patient was instructed on icing, stretching, tissue massage and support.  The patient was fitted " with a Dynaflex insert that will aid in offloading the tension forces to the soft tissues and prevent further inflammation.   The patient will return in four weeks for reevaluation if the symptoms do not resolve.        Disclaimer: This note consists of symbols derived from keyboarding, dictation and/or voice recognition software. As a result, there may be errors in the script that have gone undetected. Please consider this when interpreting information found in this chart.       LOLA Barreto.P.M., F.A.C.F.A.S.        Again, thank you for allowing me to participate in the care of your patient.        Sincerely,        Manuel Hilliard DPM

## 2017-12-15 ENCOUNTER — OFFICE VISIT (OUTPATIENT)
Dept: FAMILY MEDICINE | Facility: CLINIC | Age: 67
End: 2017-12-15
Payer: COMMERCIAL

## 2017-12-15 VITALS
SYSTOLIC BLOOD PRESSURE: 128 MMHG | WEIGHT: 184.6 LBS | RESPIRATION RATE: 16 BRPM | HEART RATE: 70 BPM | HEIGHT: 66 IN | DIASTOLIC BLOOD PRESSURE: 86 MMHG | BODY MASS INDEX: 29.67 KG/M2 | TEMPERATURE: 97.3 F

## 2017-12-15 DIAGNOSIS — J02.9 ACUTE PHARYNGITIS, UNSPECIFIED ETIOLOGY: ICD-10-CM

## 2017-12-15 DIAGNOSIS — R07.0 THROAT PAIN: Primary | ICD-10-CM

## 2017-12-15 LAB
DEPRECATED S PYO AG THROAT QL EIA: NORMAL
SPECIMEN SOURCE: NORMAL

## 2017-12-15 PROCEDURE — 87880 STREP A ASSAY W/OPTIC: CPT | Performed by: INTERNAL MEDICINE

## 2017-12-15 PROCEDURE — 99213 OFFICE O/P EST LOW 20 MIN: CPT | Performed by: INTERNAL MEDICINE

## 2017-12-15 PROCEDURE — 87081 CULTURE SCREEN ONLY: CPT | Performed by: INTERNAL MEDICINE

## 2017-12-15 RX ORDER — PENICILLIN V POTASSIUM 500 MG/1
500 TABLET, FILM COATED ORAL 2 TIMES DAILY
Qty: 20 TABLET | Refills: 0 | Status: SHIPPED | OUTPATIENT
Start: 2017-12-15 | End: 2018-06-21

## 2017-12-15 NOTE — PROGRESS NOTES
SUBJECTIVE:   Jaime Saucedo is a 67 year old male who presents to clinic today for the following health issues:    Chief Complaint   Patient presents with     RECHECK     Sore throat, sinus infection; Tx with Augmentin        ENT Symptoms             Symptoms: cc Present Absent Comment   Fever/Chills  x  Low fever   Fatigue  x     Muscle Aches  x     Eye Irritation   x    Sneezing   x    Nasal Serg/Drg  x     Sinus Pressure/Pain  x     Loss of smell  x     Dental pain  x     Sore Throat  x     Swollen Glands  x     Ear Pain/Fullness   x    Cough  x     Wheeze   x    Chest Pain   x    Shortness of breath   x    Rash   x    Other         Symptom duration:  4+ weeks   Symptom severity:  Moderate, more severe in the evening   Treatments tried:  Augmentin, OTC cough remedies   Contacts:  son had strep throat, both tx with antibiotics        Treated 11/27 for Sinus infection that started on 11/22.  He reports the sinus infection resolved and he was about 90% back to normal for many days before ST developed 7 days ago.  Now having sore throat, severe coughing fits at times.  Difficult to sleep due to cough and ST.  Overall feels symptoms are worsening.    Lifelong non-smoker.  Rare EtOH, no history of EtOH overuse.        Current Outpatient Prescriptions   Medication Sig Dispense Refill     Fcivlgbnr-BUF-LH-APAP (MIS-SELTZER PLUS COLD & FLU PO) Take 2 tablets by mouth as needed       order for DME Equipment being ordered: Dynaflex insert 1 Units 0     losartan (COZAAR) 25 MG tablet Take 1 tablet (25 mg) by mouth daily 90 tablet 3     fluticasone (FLONASE) 50 MCG/ACT spray Spray 1-2 sprays into both nostrils daily (Patient not taking: Reported on 12/15/2017) 16 g 0       Reviewed and updated as needed this visit by clinical staffTobacco  Allergies  Meds  Problems  Med Hx  Surg Hx  Fam Hx  Soc Hx        Reviewed and updated as needed this visit by Provider  Allergies  Meds  Problems      "    ROS:  Constitutional, HEENT, cardiovascular, pulmonary, gi and gu systems are negative, except as otherwise noted.      OBJECTIVE:   /86 (BP Location: Right arm, Patient Position: Chair, Cuff Size: Adult Regular)  Pulse 70  Temp 97.3  F (36.3  C) (Tympanic)  Resp 16  Ht 5' 5.5\" (1.664 m)  Wt 184 lb 9.6 oz (83.7 kg)  BMI 30.25 kg/m2  Body mass index is 30.25 kg/(m^2).  GENERAL APPEARANCE: healthy, alert and no distress  EYES: Eyes grossly normal to inspection, PERRL and conjunctivae and sclerae normal  HENT: ear canals and TM's normal, uvula elongated, nasal mucosa edematous without rhinorrhea, tonsillar hypertrophy, tonsillar erythema and tonsillar exudate  NECK: no adenopathy, no asymmetry, masses, or scars and thyroid normal to palpation  RESP: lungs clear to auscultation - no rales, rhonchi or wheezes  CV: regular rates and rhythm, normal S1 S2, no S3 or S4 and no murmur, click or rub      ASSESSMENT/PLAN:       ICD-10-CM    1. Throat pain R07.0 Strep, Rapid Screen     Beta strep group A culture   2. Acute pharyngitis, unspecified etiology J02.9 penicillin V potassium (VEETID) 500 MG tablet     Rapid strep negative, but mild exudates.  Not having classic strep throat symptoms.  Advised 2-4 more days to see if will improve on own.  If not, or worsening, then start antibiotic.  Patient agreeable.        Emperatriz Carrasquillo, DO  [facility]    "

## 2017-12-15 NOTE — MR AVS SNAPSHOT
After Visit Summary   12/15/2017    Jaime Saucedo    MRN: [id number]           Patient Information     Date Of Birth          1950        Visit Information        Provider Department      12/15/2017 10:00 AM Emperatriz Carrasquillo, DO [facility]        Today's Diagnoses     Throat pain    -  1    Acute pharyngitis, unspecified etiology          Care Instructions    Thank you for choosing [facility].  You may be receiving a survey in the mail from [facility] [employer] regarding your visit today.  Please take a few minutes to complete and return the survey to let us know how we are doing.      If you have questions or concerns, please contact us via [employer] or you can contact your care team at [phone].    Our Clinic hours are:  Monday 6:40 am  to 7:00 pm  Tuesday -Friday 6:40 am to 5:00 pm    The [place] outpatient lab hours are:  Monday - Friday 6:10 am to 4:45 pm  Saturdays 7:00 am to 11:00 am  Appointments are required, call [phone]    If you have clinical questions after hours or would like to schedule an appointment,  call the clinic at [phone].      Antibiotic sent to [facility] pharmacy.  If symptoms worsen/do not improve by Sunday/Monday, call pharmacy to fill.    You have a cold, which is a virus.  Antibiotics treat bacterial infections and not viral infections.  They will not cure or shorten a cold.  The main way to feel better is rest, hydration, good nutrition.  You can try some of the following over the counter medicines:    --For cough - try Robitussin DM or Mucinex DM (Acitve ingredients Guaifenesin and dextromethorphan)  --For nasal congestion, try saline nasal spray (Ocean brand or similar.  NOT Afrin)  --For sore throat and cough, try Chloraseptic spray, cough drops, warm salt water gargles or tea with honey.    --Use a humidifier at night  --For body aches and pains and fever, try acetaminophen or ibuprofen                Follow-ups after your visit       "  Who to contact     If you have questions or need follow up information about today's clinic visit or your schedule please contact [facility] directly at [phone].  Normal or non-critical lab and imaging results will be communicated to you by MyChart, letter or phone within 4 business days after the clinic has received the results. If you do not hear from us within 7 days, please contact the clinic through MyChart or phone. If you have a critical or abnormal lab result, we will notify you by phone as soon as possible.  Submit refill requests through [employer] or call your pharmacy and they will forward the refill request to us. Please allow 3 business days for your refill to be completed.          Additional Information About Your Visit        [employer] Information     [employer] lets you send messages to your doctor, view your test results, renew your prescriptions, schedule appointments and more. To sign up, go to [email or web address] . Click on \"Log in\" on the left side of the screen, which will take you to the Welcome page. Then click on \"Sign up Now\" on the right side of the page.     You will be asked to enter the access code listed below, as well as some personal information. Please follow the directions to create your username and password.     Your access code is: [email or web address]  Expires: 2018  3:27 PM     Your access code will  in 90 days. If you need help or a new code, please call your [place] clinic or [phone].        Care EveryWhere ID     This is your Care EveryWhere ID. This could be used by other organizations to access your [place] medical records  [id number]        Your Vitals Were     Pulse Temperature Respirations Height BMI (Body Mass Index)       70 97.3  F (36.3  C) (Tympanic) 16 5' 5.5\" (1.664 m) 30.25 kg/m2        Blood Pressure from Last 3 Encounters:   12/15/17 128/86   17 134/89   17 128/89    Weight from Last 3 Encounters:   12/15/17 184 lb " 9.6 oz (83.7 kg)   11/30/17 183 lb (83 kg)   11/27/17 183 lb 12.8 oz (83.4 kg)              We Performed the Following     Beta strep group A culture     Strep, Rapid Screen          Today's Medication Changes          These changes are accurate as of: 12/15/17 10:48 AM.  If you have any questions, ask your nurse or doctor.               Start taking these medicines.        Dose/Directions    penicillin V potassium 500 MG tablet   Commonly known as:  VEETID   Used for:  Acute pharyngitis, unspecified etiology   Started by:  Emperatriz Carrasquillo,         Dose:  500 mg   Take 1 tablet (500 mg) by mouth 2 times daily   Quantity:  20 tablet   Refills:  0            Where to get your medicines      These medications were sent to [facility] [address]  [address] [facility] [address]     Phone:  [phone]     penicillin V potassium 500 MG tablet                Primary Care Provider Office Phone # Fax #    Amari Angel Gautam [phone] [address] [facility] [address]        Equal Access to Services     [facility] AH: Hadii aad ku hadasho Soomaali, waaxda luqadaha, qaybta kaalmada adeegyada, naman pugh. So [facility] [phone].    ATENCIÓN: Si habla español, tiene a aranda disposición servicios gratuitos de asistencia lingüística. Llame al [phone].    We comply with applicable federal civil rights laws and [place] laws. We do not discriminate on the basis of race, color, national origin, age, disability, sex, sexual orientation, or gender identity.            Thank you!     Thank you for choosing [facility]  for your care. Our goal is always to provide you with excellent care. Hearing back from our patients is one way we can continue to improve our services. Please take a few minutes to complete the written survey that you may receive in the mail after your visit with  us. Thank you!             Your Updated Medication List - Protect others around you: Learn how to safely use, store and throw away your medicines at www.disposemymeds.org.          This list is accurate as of: 12/15/17 10:48 AM.  Always use your most recent med list.                   Brand Name Dispense Instructions for use Diagnosis    MIS-SELTZER PLUS COLD & FLU PO      Take 2 tablets by mouth as needed        fluticasone 50 MCG/ACT spray    FLONASE    16 g    Spray 1-2 sprays into both nostrils daily    Acute sinusitis with coexisting condition requiring prophylactic treatment       losartan 25 MG tablet    COZAAR    90 tablet    Take 1 tablet (25 mg) by mouth daily    Hypertension goal BP (blood pressure) < 140/90       order for DME     1 Units    Equipment being ordered: Dynaflex insert    Capsulitis of right foot       penicillin V potassium 500 MG tablet    VEETID    20 tablet    Take 1 tablet (500 mg) by mouth 2 times daily    Acute pharyngitis, unspecified etiology

## 2017-12-15 NOTE — PATIENT INSTRUCTIONS
Thank you for choosing [facility].  You may be receiving a survey in the mail from [employer] regarding your visit today.  Please take a few minutes to complete and return the survey to let us know how we are doing.      If you have questions or concerns, please contact us via [employer] or you can contact your care team at [phone].    Our Clinic hours are:  Monday 6:40 am  to 7:00 pm  Tuesday -Friday 6:40 am to 5:00 pm    The [place] outpatient lab hours are:  Monday - Friday 6:10 am to 4:45 pm  Saturdays 7:00 am to 11:00 am  Appointments are required, call [phone]    If you have clinical questions after hours or would like to schedule an appointment,  call the clinic at [phone].      Antibiotic sent to [facility] pharmacy.  If symptoms worsen/do not improve by Sunday/Monday, call pharmacy to fill.    You have a cold, which is a virus.  Antibiotics treat bacterial infections and not viral infections.  They will not cure or shorten a cold.  The main way to feel better is rest, hydration, good nutrition.  You can try some of the following over the counter medicines:    --For cough - try Robitussin DM or Mucinex DM (Acitve ingredients Guaifenesin and dextromethorphan)  --For nasal congestion, try saline nasal spray (Ocean brand or similar.  NOT Afrin)  --For sore throat and cough, try Chloraseptic spray, cough drops, warm salt water gargles or tea with honey.    --Use a humidifier at night  --For body aches and pains and fever, try acetaminophen or ibuprofen

## 2017-12-16 LAB
BACTERIA SPEC CULT: NORMAL
SPECIMEN SOURCE: NORMAL

## 2018-05-22 ENCOUNTER — TELEPHONE (OUTPATIENT)
Dept: FAMILY MEDICINE | Facility: CLINIC | Age: 68
End: 2018-05-22

## 2018-05-22 DIAGNOSIS — I10 HYPERTENSION GOAL BP (BLOOD PRESSURE) < 140/90: ICD-10-CM

## 2018-05-22 RX ORDER — LOSARTAN POTASSIUM 25 MG/1
25 TABLET ORAL DAILY
Qty: 30 TABLET | Refills: 0 | Status: SHIPPED | OUTPATIENT
Start: 2018-05-22 | End: 2018-06-21

## 2018-05-22 NOTE — TELEPHONE ENCOUNTER
Prescription approved per [facility]ill [facility].  Meredith COE RN

## 2018-05-22 NOTE — TELEPHONE ENCOUNTER
Reason for Call:  Medication or medication refill:    Do you use a [facility]?  Name of the pharmacy and phone number for the current request:  [facility] [phone]    Name of the medication requested: Losartan - Pt asking for 1 mo refill to get him through until he can schedule with a new provider.    Losartan      Last Written Prescription Date:  11/14/17  Last Fill Quantity: 90,   # refills: 3  Last Office Visit: 4/18/17  Future Office visit:       Other request:     Can we leave a detailed message on this number? YES    Phone number patient can be reached at: Home number on file [phone] (home)    Best Time: any    Call taken on 5/22/2018 at 8:24 AM by Mamie Quintero

## 2018-06-11 ENCOUNTER — TRANSFERRED RECORDS (OUTPATIENT)
Dept: HEALTH INFORMATION MANAGEMENT | Facility: CLINIC | Age: 68
End: 2018-06-11

## 2018-06-21 ENCOUNTER — OFFICE VISIT (OUTPATIENT)
Dept: FAMILY MEDICINE | Facility: CLINIC | Age: 68
End: 2018-06-21
Payer: COMMERCIAL

## 2018-06-21 VITALS
WEIGHT: 185 LBS | HEIGHT: 66 IN | SYSTOLIC BLOOD PRESSURE: 116 MMHG | BODY MASS INDEX: 29.73 KG/M2 | HEART RATE: 87 BPM | TEMPERATURE: 98.2 F | DIASTOLIC BLOOD PRESSURE: 73 MMHG | RESPIRATION RATE: 16 BRPM

## 2018-06-21 DIAGNOSIS — Z13.6 CARDIOVASCULAR SCREENING; LDL GOAL LESS THAN 160: ICD-10-CM

## 2018-06-21 DIAGNOSIS — H35.342 MACULAR HOLE, LEFT: ICD-10-CM

## 2018-06-21 DIAGNOSIS — Z01.818 PREOP GENERAL PHYSICAL EXAM: Primary | ICD-10-CM

## 2018-06-21 DIAGNOSIS — I10 HYPERTENSION GOAL BP (BLOOD PRESSURE) < 140/90: ICD-10-CM

## 2018-06-21 LAB
ANION GAP SERPL CALCULATED.3IONS-SCNC: 7 MMOL/L (ref 3–14)
BUN SERPL-MCNC: 18 MG/DL (ref 7–30)
CALCIUM SERPL-MCNC: 9 MG/DL (ref 8.5–10.1)
CHLORIDE SERPL-SCNC: 108 MMOL/L (ref 94–109)
CHOLEST SERPL-MCNC: 208 MG/DL
CO2 SERPL-SCNC: 23 MMOL/L (ref 20–32)
CREAT SERPL-MCNC: 1.1 MG/DL (ref 0.66–1.25)
GFR SERPL CREATININE-BSD FRML MDRD: 67 ML/MIN/1.7M2
GLUCOSE SERPL-MCNC: 122 MG/DL (ref 70–99)
HDLC SERPL-MCNC: 42 MG/DL
LDLC SERPL CALC-MCNC: 123 MG/DL
NONHDLC SERPL-MCNC: 166 MG/DL
POTASSIUM SERPL-SCNC: 4 MMOL/L (ref 3.4–5.3)
SODIUM SERPL-SCNC: 138 MMOL/L (ref 133–144)
TRIGL SERPL-MCNC: 217 MG/DL

## 2018-06-21 PROCEDURE — 80061 LIPID PANEL: CPT | Performed by: FAMILY MEDICINE

## 2018-06-21 PROCEDURE — 99214 OFFICE O/P EST MOD 30 MIN: CPT | Performed by: FAMILY MEDICINE

## 2018-06-21 PROCEDURE — 36415 COLL VENOUS BLD VENIPUNCTURE: CPT | Performed by: FAMILY MEDICINE

## 2018-06-21 PROCEDURE — 80048 BASIC METABOLIC PNL TOTAL CA: CPT | Performed by: FAMILY MEDICINE

## 2018-06-21 RX ORDER — LOSARTAN POTASSIUM 25 MG/1
25 TABLET ORAL DAILY
Qty: 90 TABLET | Refills: 3 | Status: SHIPPED | OUTPATIENT
Start: 2018-06-21 | End: 2019-07-03

## 2018-06-21 NOTE — MR AVS SNAPSHOT
After Visit Summary   6/21/2018    Jaime Saucedo    MRN: [id number]           Patient Information     Date Of Birth          1950        Visit Information        Provider Department      6/21/2018 2:00 PM Tomasa, JESI Richard MD [facility]        Today's Diagnoses     Preop general physical exam    -  1    Macular hole, left        Hypertension goal BP (blood pressure) < 140/90        CARDIOVASCULAR SCREENING; LDL GOAL LESS THAN 160          Care Instructions      Before Your Surgery      Call your surgeon if there is any change in your health. This includes signs of a cold or flu (such as a sore throat, runny nose, cough, rash or fever).    Do not smoke, drink alcohol or take over the counter medicine (unless your surgeon or primary care doctor tells you to) for the 24 hours before and after surgery.    If you take prescribed drugs: Follow your doctor s orders about which medicines to take and which to stop until after surgery.    Eating and drinking prior to surgery: follow the instructions from your surgeon    Take a shower or bath the night before surgery. Use the soap your surgeon gave you to gently clean your skin. If you do not have soap from your surgeon, use your regular soap. Do not shave or scrub the surgery site.  Wear clean pajamas and have clean sheets on your bed.           Follow-ups after your visit        Who to contact     If you have questions or need follow up information about today's clinic visit or your schedule please contact [facility] directly at [phone].  Normal or non-critical lab and imaging results will be communicated to you by MyChart, letter or phone within 4 business days after the clinic has received the results. If you do not hear from us within 7 days, please contact the clinic through MyChart or phone. If you have a critical or abnormal lab result, we will notify you by phone as soon as possible.  Submit refill requests  "through [employer] or call your pharmacy and they will forward the refill request to us. Please allow 3 business days for your refill to be completed.          Additional Information About Your Visit        Care EveryWhere ID     This is your Care EveryWhere ID. This could be used by other organizations to access your [place] medical records  [id number]        Your Vitals Were     Pulse Temperature Respirations Height BMI (Body Mass Index)       87 98.2  F (36.8  C) (Tympanic) 16 5' 5.5\" (1.664 m) 30.32 kg/m2        Blood Pressure from Last 3 Encounters:   06/21/18 116/73   12/15/17 128/86   11/27/17 134/89    Weight from Last 3 Encounters:   06/21/18 185 lb (83.9 kg)   12/15/17 184 lb 9.6 oz (83.7 kg)   11/30/17 183 lb (83 kg)              We Performed the Following     Basic metabolic panel     Lipid panel reflex to direct LDL Non-fasting          Where to get your medicines      These medications were sent to [facility] [address]  [id number] [place] [address]     Phone:  [phone]     losartan 25 MG tablet          Primary Care Provider Office Phone # Fax #    R Jose Manuel Randolph [phone] [address] [facility] [address]        Equal Access to Services     [facility]JESI : Hadii justyn eldridge haddello Sojose luis, waaxda luqadaha, qaybta kaalmada david, naman mead . So [facility] [phone].    ATENCIÓN: Si habla español, tiene a aranda disposición servicios gratuitos de asistencia lingüística. Llame al [phone].    We comply with applicable federal civil rights laws and [place] laws. We do not discriminate on the basis of race, color, national origin, age, disability, sex, sexual orientation, or gender identity.            Thank you!     Thank you for choosing [facility]  for your care. Our goal is always to provide you with excellent care. Hearing back from our " patients is one way we can continue to improve our services. Please take a few minutes to complete the written survey that you may receive in the mail after your visit with us. Thank you!             Your Updated Medication List - Protect others around you: Learn how to safely use, store and throw away your medicines at www.disposemymeds.org.          This list is accurate as of 6/21/18  2:50 PM.  Always use your most recent med list.                   Brand Name Dispense Instructions for use Diagnosis    losartan 25 MG tablet    COZAAR    90 tablet    Take 1 tablet (25 mg) by mouth daily    Hypertension goal BP (blood pressure) < 140/90

## 2018-06-21 NOTE — PROGRESS NOTES
[facility]  [address] [address]  [phone]  Dept: [phone]    PRE-OP EVALUATION:  Today's date: 2018    Jaime Saucedo (: 1950) presents for pre-operative evaluation assessment as requested by Dr. Manning.  He requires evaluation and anesthesia risk assessment prior to undergoing surgery/procedure for treatment of macular hole, left eye .    Proposed Surgery/ Procedure: Vitrectomy left eye  Date of Surgery/ Procedure: 2018  Time of Surgery/ Procedure: 1:30 pm  Hospital/Surgical Facility: [facility] [address]  Fax number for surgical facility: [phone]  Primary Physician: JESI Randolph  Type of Anesthesia Anticipated: Local with MAC ?    Patient has a Health Care Directive or Living Will:  NO    1. NO - Do you have a history of heart attack, stroke, stent, bypass or surgery on an artery in the head, neck, heart or legs?  2. NO - Do you ever have any pain or discomfort in your chest?  3. NO - Do you have a history of  Heart Failure?  4. NO - Are you troubled by shortness of breath when: walking on the level, up a slight hill or at night?  5. NO - Do you currently have a cold, bronchitis or other respiratory infection?  6. NO - Do you have a cough, shortness of breath or wheezing?  7. NO - Do you sometimes get pains in the calves of your legs when you walk?  8. NO - Do you or anyone in your family have previous history of blood clots?  9. NO - Do you or does anyone in your family have a serious bleeding problem such as prolonged bleeding following surgeries or cuts?  10. NO - Have you ever had problems with anemia or been told to take iron pills?  11. NO - Have you had any abnormal blood loss such as black, tarry or bloody stools, or abnormal vaginal bleeding?  12. NO - Have you ever had a blood transfusion?  13. NO - Have you or any of your relatives ever had problems with anesthesia?  14. NO - Do you have sleep apnea, excessive snoring or  "daytime drowsiness?  15. NO - Do you have any prosthetic heart valves?  16. NO - Do you have prosthetic joints?  17. NO - Is there any chance that you may be pregnant?      HPI:     HPI related to upcoming procedure: He had noticed some visual loss in the left eye and was referred by his ophthalmologist to the retinal specialist, who found a small macular hole and recommended the above procedure      See problem list for active medical problems.  Problems all longstanding and stable, except as noted/documented.  See ROS for pertinent symptoms related to these conditions.                                                                                                                                                          .    MEDICAL HISTORY:     Patient Active Problem List    Diagnosis Date Noted     Hypertension goal BP (blood pressure) < 140/90 04/18/2017     Priority: Medium     CARDIOVASCULAR SCREENING; LDL GOAL LESS THAN 160 04/18/2017     Priority: Medium      No past medical history on file.  Past Surgical History:   Procedure Laterality Date     ARTHROSCOPY KNEE RT/LT Bilateral 2006    partial meniscectomy     Current Outpatient Prescriptions   Medication Sig Dispense Refill     losartan (COZAAR) 25 MG tablet Take 1 tablet (25 mg) by mouth daily 90 tablet 3             OTC products: none    No Known Allergies   Latex Allergy: NO    Social History   Substance Use Topics     Smoking status: Never Smoker     Smokeless tobacco: Never Used     Alcohol use Yes      Comment: rare     History   Drug Use No       REVIEW OF SYSTEMS:   CONSTITUTIONAL: NEGATIVE for fever, chills, change in weight  ENT/MOUTH: NEGATIVE for ear, mouth and throat problems  RESP: NEGATIVE for significant cough or SOB  CV: NEGATIVE for chest pain, palpitations or peripheral edema    EXAM:   /73  Pulse 87  Temp 98.2  F (36.8  C) (Tympanic)  Resp 16  Ht 5' 5.5\" (1.664 m)  Wt 185 lb (83.9 kg)  BMI 30.32 kg/m2  GENERAL APPEARANCE: " healthy, alert and no distress  HENT: ear canals and TM's normal and nose and mouth without ulcers or lesions  RESP: lungs clear to auscultation - no rales, rhonchi or wheezes  CV: regular rate and rhythm, normal S1 S2, no S3 or S4 and no murmur, click or rub   ABDOMEN: soft, nontender, no HSM or masses and bowel sounds normal  NEURO: Normal strength and tone, sensory exam grossly normal, mentation intact and speech normal    DIAGNOSTICS:   No labs or EKG required for low risk surgery (cataract, skin procedure, breast biopsy, etc)    Labs drawn and pending include BMP and lipid panel    IMPRESSION:   Reason for surgery/procedure: Macular hole, left eye  Diagnosis/reason for consult: Evaluate for anesthesia risk    The proposed surgical procedure is considered LOW risk.    REVISED CARDIAC RISK INDEX  The patient has the following serious cardiovascular risks for perioperative complications such as (MI, PE, VFib and 3  AV Block):  No serious cardiac risks  INTERPRETATION: 0 risks: Class I (very low risk - 0.4% complication rate)    The patient has the following additional risks for perioperative complications:  No identified additional risks      ICD-10-CM    1. Preop general physical exam Z01.818    2. Macular hole, left H35.342    3. Hypertension goal BP (blood pressure) < 140/90 I10 losartan (COZAAR) 25 MG tablet     Basic metabolic panel   4. CARDIOVASCULAR SCREENING; LDL GOAL LESS THAN 160 Z13.6 Lipid panel reflex to direct LDL Non-fasting       RECOMMENDATIONS:         --Patient is to take all scheduled medications on the day of surgery     APPROVAL GIVEN to proceed with proposed procedure, without further diagnostic evaluation       Signed Electronically by: JESI Randolph MD    Copy of this evaluation report is provided to requesting physician.    [facility] Preop Guidelines    Revised Cardiac Risk Index

## 2018-06-21 NOTE — LETTER
[facility]  [address]  [facility] [address]  Phone: [phone]      6/22/2018     Jaime Saucedo  [address] [facility] [address]      Dear Jaime:    Thank you for allowing me to participate in your care. Your recent test results were reviewed and listed below. acceptable results.  The cholesterol levels are little higher than ideal.  Your blood sugar was also a little elevated but this was a nonfasting specimen, so probably okay.  Continue your losartan at the same dose     Your results are provided below for your review  Results for orders placed or performed in visit on 06/21/18   Basic metabolic panel   Result Value Ref Range    Sodium 138 133 - 144 mmol/L    Potassium 4.0 3.4 - 5.3 mmol/L    Chloride 108 94 - 109 mmol/L    Carbon Dioxide 23 20 - 32 mmol/L    Anion Gap 7 3 - 14 mmol/L    Glucose 122 (H) 70 - 99 mg/dL    Urea Nitrogen 18 7 - 30 mg/dL    Creatinine 1.10 0.66 - 1.25 mg/dL    GFR Estimate 67 >60 mL/min/1.7m2    GFR Estimate If Black 81 >60 mL/min/1.7m2    Calcium 9.0 8.5 - 10.1 mg/dL   Lipid panel reflex to direct LDL Non-fasting   Result Value Ref Range    Cholesterol 208 (H) <200 mg/dL    Triglycerides 217 (H) <150 mg/dL    HDL Cholesterol 42 >39 mg/dL    LDL Cholesterol Calculated 123 (H) <100 mg/dL    Non HDL Cholesterol 166 (H) <130 mg/dL                 Thank you for choosing [place]. As a result, please continue with the treatment plan discussed in the office. Return as discussed or sooner if symptoms worsen or fail to improve. If you have any further questions or concerns, please do not hesitate to contact us.      Sincerely,        Dr. Jose Manuel Randolph

## 2018-06-22 NOTE — PROGRESS NOTES
Please SEND LETTER    Notify patient of acceptable results.  The cholesterol levels are little higher than ideal.  Your blood sugar was also a little elevated but this was a nonfasting specimen, so probably okay.  Continue your losartan at the same dose

## 2018-11-12 ENCOUNTER — TRANSFERRED RECORDS (OUTPATIENT)
Dept: HEALTH INFORMATION MANAGEMENT | Facility: CLINIC | Age: 68
End: 2018-11-12

## 2019-02-28 NOTE — H&P
[facility]  [facility]  [address]  [facility] [address]  [phone]  Dept: [phone]    OPHTHALMOLOGY PRE-OPERATIVE  HISTORY AND PHYSICAL    DATE OF H/P:  2019    DATE OF SURGERY:  2019  PROCEDURE:  Procedure(s):  Cataract Removal with Implant, Left Eye  LENS IMPLANT:  [id number] +19.5  REFRACTIVE GOAL:  PL Sph  SURGEON:  Johan Anthony MD    ANESTHESIA:  TOPICAL / MAC    OR CASE REQUIREMENTS:  Post-vitrectomy settings.    DEMOGRAPHICS:  Demographic Information on Jaime Saucedo:    Jaime Saucedo  Gender: male  : 1950  [address] [facility] [address] [phone] (home)     Medical Record: [id number]  Social Security Number: [id number]  Pharmacy: [facility] [address]  Primary Care Provider: JESI Randolph    Parent's names are: Data Unavailable (mother) and Data Unavailable (father).    Insurance: Payor: MEDICARE / Plan: MEDICARE FOR  SUPPLEMENT / Product Type: Medicare /     OCULAR HISTORY:  Cataracts each eye.  H/o macular hole left eye - s/p vitrectomy .  Mild ERM right eye.    HISTORIES:  HTN.    No past medical history on file.    Past Surgical History:   Procedure Laterality Date     ARTHROSCOPY KNEE RT/LT Bilateral 2006    partial meniscectomy       Family History   Problem Relation Age of Onset     Hypertension Mother      Hypertension Father        Social History     Tobacco Use     Smoking status: Never Smoker     Smokeless tobacco: Never Used   Substance Use Topics     Alcohol use: Yes     Comment: rare       MEDICATIONS:  No current facility-administered medications for this encounter.      Current Outpatient Medications   Medication Sig     losartan (COZAAR) 25 MG tablet Take 1 tablet (25 mg) by mouth daily       ALLERGIES:   No Known Allergies    PERTINENT SYSTEMS REVIEW:    1. No - Do you have a history of heart attack, stroke, stent, bypass or surgery on an artery in the  head, neck, heart or legs?  2. No - Do you ever have any pain or discomfort in your chest?  3. No - Do you have a history of  Heart Failure?  4. No - Are you troubled by shortness of breath when walking: On the level, up a slight hill or at night?  5. No - Do you currently have a cold, bronchitis or other respiratory infection?  6. No - Do you have a cough, shortness of breath or wheezing?  7. No - Do you sometimes get pains in the calves of your legs when you walk?  8. No - Do you or anyone in your family have previous history of blood clots?  9. No - Do you or does anyone in your family have a serious bleeding problem such as prolonged bleeding following surgeries or cuts?  10. No - Have you ever had problems with anemia or been told to take iron pills?  11. No - Have you had any abnormal blood loss such as black, tarry or bloody stools, or abnormal vaginal bleeding?  12. No - Have you ever had a blood transfusion?  13. No - Have you or any of your relatives ever had problems with anesthesia?  14. No - Do you have sleep apnea, excessive snoring or daytime drowsiness?  15. No - Do you have any prosthetic heart valves?  16. No - Do you have prosthetic joints?    EXAMINATION:  Vitals were reviewed  Vison:  Va, right - 20/25, left - 20/300;   BAT, right - 20/70, left - 20/400;  HEENT:  Cataract, otherwise unremarkable.  LUNGS:  Clear  CV:  Regular rate and rhythm without murmur  ABD:  Soft and nontender  NEURO:  Alert and nonfocal    IMPRESSION:  Patient cleared for ophthalmic surgery.  Low risk with monitored, light sedation.  I have assessed the patient's DVT risk, and no additional orders necessary.    PLAN:  Procedure(s):  Cataract Removal with Implant, Left Eye      Johan Anthony MD

## 2019-03-05 ENCOUNTER — ANESTHESIA EVENT (OUTPATIENT)
Dept: SURGERY | Facility: CLINIC | Age: 69
End: 2019-03-05
Payer: COMMERCIAL

## 2019-03-05 NOTE — ANESTHESIA PREPROCEDURE EVALUATION
Anesthesia Pre-Procedure Evaluation    Patient: Jaime Saucedo   MRN: [id number] : 1950          Preoperative Diagnosis: cataract    Procedure(s):  Cataract Removal with Implant    No past medical history on file.  Past Surgical History:   Procedure Laterality Date     ARTHROSCOPY KNEE RT/LT Bilateral 2006    partial meniscectomy       Anesthesia Evaluation     . Pt has had prior anesthetic. Type: General    No history of anesthetic complications          ROS/MED HX    ENT/Pulmonary:     (+)sleep apnea, , . .    Neurologic:  - neg neurologic ROS     Cardiovascular:     (+) Dyslipidemia, hypertension----. : . . . :. .       METS/Exercise Tolerance:     Hematologic:  - neg hematologic  ROS       Musculoskeletal:  - neg musculoskeletal ROS       GI/Hepatic:     (+) GERD Other,       Renal/Genitourinary:  - ROS Renal section negative       Endo:  - neg endo ROS       Psychiatric:  - neg psychiatric ROS       Infectious Disease:  - neg infectious disease ROS       Malignancy:      - no malignancy   Other:                          Physical Exam  Normal systems: cardiovascular, pulmonary and dental    Airway   Mallampati: II  TM distance: >3 FB  Neck ROM: full    Dental     Cardiovascular       Pulmonary             Lab Results   Component Value Date     2018    POTASSIUM 4.0 2018    CHLORIDE 108 2018    CO2 23 2018    BUN 18 2018    CR 1.10 2018     (H) 2018    RALU 9.0 2018       Preop Vitals  BP Readings from Last 3 Encounters:   18 116/73   12/15/17 128/86   17 134/89    Pulse Readings from Last 3 Encounters:   18 87   12/15/17 70   17 80      Resp Readings from Last 3 Encounters:   18 16   12/15/17 16   17 16    SpO2 Readings from Last 3 Encounters:   17 97%   17 98%   17 96%      Temp Readings from Last 1 Encounters:   18 36.8  C (98.2  F) (Tympanic)    Ht Readings from Last 1 Encounters:  "  06/21/18 1.664 m (5' 5.5\")      Wt Readings from Last 1 Encounters:   06/21/18 83.9 kg (185 lb)    Estimated body mass index is 30.32 kg/m  as calculated from the following:    Height as of 6/21/18: 1.664 m (5' 5.5\").    Weight as of 6/21/18: 83.9 kg (185 lb).       Anesthesia Plan      History & Physical Review  History and physical reviewed and following examination; no interval change.    ASA Status:  2 .    NPO Status:  > 8 hours    Plan for MAC Reason for MAC:  Procedure to face, neck, head or breast         Postoperative Care      Consents  Anesthetic plan, risks, benefits and alternatives discussed with:  Patient..                 EBER Rodríguez CRNA  "

## 2019-03-06 ENCOUNTER — ANESTHESIA (OUTPATIENT)
Dept: SURGERY | Facility: CLINIC | Age: 69
End: 2019-03-06
Payer: COMMERCIAL

## 2019-03-06 ENCOUNTER — HOSPITAL ENCOUNTER (OUTPATIENT)
Facility: CLINIC | Age: 69
Discharge: HOME | End: 2019-03-06
Attending: OPHTHALMOLOGY | Admitting: OPHTHALMOLOGY
Payer: COMMERCIAL

## 2019-03-06 VITALS
RESPIRATION RATE: 16 BRPM | OXYGEN SATURATION: 97 % | DIASTOLIC BLOOD PRESSURE: 72 MMHG | WEIGHT: 175 LBS | SYSTOLIC BLOOD PRESSURE: 106 MMHG | TEMPERATURE: 97.6 F | BODY MASS INDEX: 28.12 KG/M2 | HEIGHT: 66 IN | HEART RATE: 71 BPM

## 2019-03-06 PROCEDURE — 25000125 ZZHC RX 250: Performed by: NURSE ANESTHETIST, CERTIFIED REGISTERED

## 2019-03-06 PROCEDURE — 37000012 ZZH ANESTHESIA CATARACT PACKAGE: Performed by: OPHTHALMOLOGY

## 2019-03-06 PROCEDURE — V2632 POST CHMBR INTRAOCULAR LENS: HCPCS | Performed by: OPHTHALMOLOGY

## 2019-03-06 PROCEDURE — 71000022 ZZH RECOVERY CATRACT PACKAGE: Performed by: OPHTHALMOLOGY

## 2019-03-06 PROCEDURE — 25000128 H RX IP 250 OP 636: Performed by: OPHTHALMOLOGY

## 2019-03-06 PROCEDURE — 25000128 H RX IP 250 OP 636: Performed by: NURSE ANESTHETIST, CERTIFIED REGISTERED

## 2019-03-06 PROCEDURE — 36000025 ZZH CATARACT SURGICAL PACKAGE: Performed by: OPHTHALMOLOGY

## 2019-03-06 PROCEDURE — 25800030 ZZH RX IP 258 OP 636: Performed by: NURSE ANESTHETIST, CERTIFIED REGISTERED

## 2019-03-06 PROCEDURE — 25000125 ZZHC RX 250: Performed by: OPHTHALMOLOGY

## 2019-03-06 DEVICE — EYE IMP IOL AMO PCL TECNIS ZCB00 19.5: Type: IMPLANTABLE DEVICE | Site: POSTERIOR CHAMBER | Status: FUNCTIONAL

## 2019-03-06 RX ORDER — LIDOCAINE 40 MG/G
CREAM TOPICAL
Status: DISCONTINUED | OUTPATIENT
Start: 2019-03-06 | End: 2019-03-06 | Stop reason: HOSPADM

## 2019-03-06 RX ORDER — TROPICAMIDE 10 MG/ML
1 SOLUTION/ DROPS OPHTHALMIC
Status: COMPLETED | OUTPATIENT
Start: 2019-03-06 | End: 2019-03-06

## 2019-03-06 RX ORDER — TETRACAINE HYDROCHLORIDE 5 MG/ML
SOLUTION OPHTHALMIC PRN
Status: DISCONTINUED | OUTPATIENT
Start: 2019-03-06 | End: 2019-03-06 | Stop reason: HOSPADM

## 2019-03-06 RX ORDER — PHENYLEPHRINE HYDROCHLORIDE 25 MG/ML
1 SOLUTION/ DROPS OPHTHALMIC
Status: COMPLETED | OUTPATIENT
Start: 2019-03-06 | End: 2019-03-06

## 2019-03-06 RX ORDER — SODIUM CHLORIDE, SODIUM LACTATE, POTASSIUM CHLORIDE, CALCIUM CHLORIDE 600; 310; 30; 20 MG/100ML; MG/100ML; MG/100ML; MG/100ML
INJECTION, SOLUTION INTRAVENOUS CONTINUOUS
Status: DISCONTINUED | OUTPATIENT
Start: 2019-03-06 | End: 2019-03-06 | Stop reason: HOSPADM

## 2019-03-06 RX ORDER — CYCLOPENTOLATE HYDROCHLORIDE 10 MG/ML
1 SOLUTION/ DROPS OPHTHALMIC
Status: COMPLETED | OUTPATIENT
Start: 2019-03-06 | End: 2019-03-06

## 2019-03-06 RX ORDER — BALANCED SALT SOLUTION 6.4; .75; .48; .3; 3.9; 1.7 MG/ML; MG/ML; MG/ML; MG/ML; MG/ML; MG/ML
SOLUTION OPHTHALMIC PRN
Status: DISCONTINUED | OUTPATIENT
Start: 2019-03-06 | End: 2019-03-06 | Stop reason: HOSPADM

## 2019-03-06 RX ADMIN — TROPICAMIDE 1 DROP: 10 SOLUTION/ DROPS OPHTHALMIC at 08:39

## 2019-03-06 RX ADMIN — TROPICAMIDE 1 DROP: 10 SOLUTION/ DROPS OPHTHALMIC at 08:31

## 2019-03-06 RX ADMIN — CYCLOPENTOLATE HYDROCHLORIDE 1 DROP: 10 SOLUTION/ DROPS OPHTHALMIC at 08:31

## 2019-03-06 RX ADMIN — CYCLOPENTOLATE HYDROCHLORIDE 1 DROP: 10 SOLUTION/ DROPS OPHTHALMIC at 08:23

## 2019-03-06 RX ADMIN — LIDOCAINE HYDROCHLORIDE 1 ML: 10 INJECTION, SOLUTION EPIDURAL; INFILTRATION; INTRACAUDAL; PERINEURAL at 08:19

## 2019-03-06 RX ADMIN — PHENYLEPHRINE HYDROCHLORIDE 1 DROP: 25 SOLUTION/ DROPS OPHTHALMIC at 08:30

## 2019-03-06 RX ADMIN — PHENYLEPHRINE HYDROCHLORIDE 1 DROP: 25 SOLUTION/ DROPS OPHTHALMIC at 08:22

## 2019-03-06 RX ADMIN — MIDAZOLAM 2 MG: 1 INJECTION INTRAMUSCULAR; INTRAVENOUS at 09:29

## 2019-03-06 RX ADMIN — SODIUM CHLORIDE, POTASSIUM CHLORIDE, SODIUM LACTATE AND CALCIUM CHLORIDE: 600; 310; 30; 20 INJECTION, SOLUTION INTRAVENOUS at 08:19

## 2019-03-06 RX ADMIN — MIDAZOLAM 1 MG: 1 INJECTION INTRAMUSCULAR; INTRAVENOUS at 09:35

## 2019-03-06 RX ADMIN — MIDAZOLAM 1 MG: 1 INJECTION INTRAMUSCULAR; INTRAVENOUS at 09:33

## 2019-03-06 RX ADMIN — CYCLOPENTOLATE HYDROCHLORIDE 1 DROP: 10 SOLUTION/ DROPS OPHTHALMIC at 08:39

## 2019-03-06 RX ADMIN — TROPICAMIDE 1 DROP: 10 SOLUTION/ DROPS OPHTHALMIC at 08:23

## 2019-03-06 RX ADMIN — PHENYLEPHRINE HYDROCHLORIDE 1 DROP: 25 SOLUTION/ DROPS OPHTHALMIC at 08:38

## 2019-03-06 ASSESSMENT — MIFFLIN-ST. JEOR: SCORE: 1516.54

## 2019-03-06 NOTE — ANESTHESIA POSTPROCEDURE EVALUATION
Patient: Jaime Saucedo    Procedure(s):  Cataract Removal with Implant    Diagnosis:cataract  Diagnosis Additional Information: No value filed.    Anesthesia Type:  MAC    Note:  Anesthesia Post Evaluation    Patient location during evaluation: Phase 2 and Bedside  Patient participation: Able to fully participate in evaluation  Level of consciousness: awake and alert  Pain management: adequate  Airway patency: patent  Cardiovascular status: acceptable  Respiratory status: acceptable  Hydration status: acceptable  PONV: none     Anesthetic complications: None          Last vitals:  Vitals:    03/06/19 0804 03/06/19 0955   BP: 125/75 104/75   Pulse: 71    Resp: 18 16   Temp: 36.4  C (97.5  F) 36.4  C (97.6  F)   SpO2: 100%          Electronically Signed By: EBER Alex CRNA  March 6, 2019  10:08 AM

## 2019-03-06 NOTE — OP NOTE
CATARACT OPERATIVE NOTE    PATIENT: Jaime Saucedo  DATE OF SURGERY: 3/6/2019  PREOPERATIVE DIAGNOSIS:  Senile Nuclear Cataract, Left eye  POSTOPERATIVE DIAGNOSIS:  Senile Nuclear Cataract, Left eye  OPERATIVE PROCEDURE:  Phacoemulsification and placement of intraocular lens  SURGEON:  Johan Anthony MD  ANESTHESIA:  Topical / MAC  EBL:  None  SPECIMENS:  None  COMPLICATIONS:  None    PROCEDURE:  The patient was brought to the operating room at [facility].  The left eye was prepped and draped in the usual fashion for cataract surgery.  A wire lid speculum was inserted.  A super sharp blade was used to make a paracentesis at the 5 O'clock position.  The super sharp blade was used to make a partial thickness temporal groove, which was 3 mm in length.  0.8 mL of non-preserved epi-Shugarcaine was injected into the anterior chamber.  Viscoelastic was used to inflate the anterior chamber through a cannula.  A 2.5 mm microkeratome was used to make a temporal clear corneal incision in a two-plane fashion.  A cystotome needle and forceps were used to make a capsulorrhexis.  Hydrodissection and hydrodelineation were performed with Balance Salt Solution.  The lens was then phacoemulsified and removed without complications.  The cortical material was removed with bimanual irrigation and aspiration.  The capsular bag was filled with viscoelastic.  A posterior chamber intraocular lens, preselected and recorded, was folded and inserted into the capsular bag.  The viscoelastic was removed with the irrigation and aspiration tip.  Balanced Salt Solution with Vigamox, 150mg/0.1mL, was used to refill the anterior chamber.  The wounds were checked for water tightness and required no suture.  The wire lid speculum was removed.  The patient's left eye was cleaned and a drop of each post-operative drop was placed, followed by a cooper shield.  The patient tolerated the procedure well, and there were no  complications.      Johan Anthony MD

## 2019-07-05 ENCOUNTER — OFFICE VISIT (OUTPATIENT)
Dept: FAMILY MEDICINE | Facility: CLINIC | Age: 69
End: 2019-07-05
Payer: COMMERCIAL

## 2019-07-05 VITALS
RESPIRATION RATE: 16 BRPM | OXYGEN SATURATION: 96 % | TEMPERATURE: 97.7 F | HEART RATE: 86 BPM | SYSTOLIC BLOOD PRESSURE: 115 MMHG | WEIGHT: 178 LBS | BODY MASS INDEX: 29.66 KG/M2 | DIASTOLIC BLOOD PRESSURE: 80 MMHG | HEIGHT: 65 IN

## 2019-07-05 DIAGNOSIS — E78.00 ELEVATED CHOLESTEROL: ICD-10-CM

## 2019-07-05 DIAGNOSIS — L82.1 SEBORRHEIC KERATOSES: ICD-10-CM

## 2019-07-05 DIAGNOSIS — I10 HYPERTENSION GOAL BP (BLOOD PRESSURE) < 140/90: Primary | ICD-10-CM

## 2019-07-05 PROCEDURE — 99213 OFFICE O/P EST LOW 20 MIN: CPT | Performed by: NURSE PRACTITIONER

## 2019-07-05 RX ORDER — LOSARTAN POTASSIUM 25 MG/1
25 TABLET ORAL DAILY
Qty: 90 TABLET | Refills: 3 | Status: SHIPPED | OUTPATIENT
Start: 2019-07-05 | End: 2020-11-04

## 2019-07-05 ASSESSMENT — MIFFLIN-ST. JEOR: SCORE: 1513.24

## 2019-07-05 NOTE — PROGRESS NOTES
Jaime Saucedo is a 69 year old male who presents to clinic today for the following health issues:    HPI   Hypertension Follow-up      Do you check your blood pressure regularly outside of the clinic? No     Are you following a low salt diet? Yes    Are your blood pressures ever more than 140 on the top number (systolic) OR more   than 90 on the bottom number (diastolic), for example 140/90? No    Amount of exercise or physical activity: 2-3 days/week for an average of 45-60 minutes    Problems taking medications regularly: No    Medication side effects: none    Diet: regular (no restrictions)    Lipids were mildly elevated last year also, pt states that he has lost some weight      Lesion  Patient has had this lesion on his arm that has been treated in the past, although I am unable to find these records.  It goes away and then comes back.  It is like scaling skin.  This is not enlarging in size that patient has noticed but does not go away.    Patient Active Problem List   Diagnosis     Hypertension goal BP (blood pressure) < 140/90     CARDIOVASCULAR SCREENING; LDL GOAL LESS THAN 160     Past Surgical History:   Procedure Laterality Date     ARTHROSCOPY KNEE RT/LT Bilateral 2006    partial meniscectomy     PHACOEMULSIFICATION WITH STANDARD INTRAOCULAR LENS IMPLANT Left 3/6/2019    Procedure: Cataract Removal with Implant;  Surgeon: Johan Anthony MD;  Location: [facility] OR       Social History     Tobacco Use     Smoking status: Never Smoker     Smokeless tobacco: Never Used   Substance Use Topics     Alcohol use: Yes     Comment: rare     Family History   Problem Relation Age of Onset     Hypertension Mother      Hypertension Father          Current Outpatient Medications   Medication Sig Dispense Refill     losartan (COZAAR) 25 MG tablet Take 1 tablet (25 mg) by mouth daily 90 tablet 3     No Known Allergies    Reviewed and updated as needed this visit by Provider  Tobacco  Allergies  Meds  Problems   "Med Hx  Surg Hx  Fam Hx         Review of Systems   ROS COMP: CONSTITUTIONAL: NEGATIVE for fever, chills, change in weight  INTEGUMENTARY/SKIN: POSITIVE for scaling right arm  RESP: NEGATIVE for significant cough or SOB  CV: NEGATIVE for chest pain, palpitations or peripheral edema  PSYCHIATRIC: NEGATIVE for changes in mood or affect  ROS otherwise negative      Objective    /80   Pulse 86   Temp 97.7  F (36.5  C) (Tympanic)   Resp 16   Ht 1.657 m (5' 5.25\")   Wt 80.7 kg (178 lb)   SpO2 96%   BMI 29.39 kg/m    Body mass index is 29.39 kg/m .  Physical Exam   GENERAL: healthy, alert and no distress  RESP: lungs clear to auscultation - no rales, rhonchi or wheezes  CV: regular rate and rhythm, normal S1 S2, no S3 or S4, no murmur, click or rub, no peripheral edema and peripheral pulses strong  SKIN: scaling lesion on right arm  PSYCH: mentation appears normal, affect normal/bright    Diagnostic Test Results:  Labs reviewed in Epic        Assessment & Plan     1. Hypertension goal BP (blood pressure) < 140/90  BP stable.  Ordered BMP for evaluation and refilled medication for 1 year.  Recommend that he follow-up with establish care appointment with new PCP due to Dr. Randolph retiring.  - losartan (COZAAR) 25 MG tablet; Take 1 tablet (25 mg) by mouth daily  Dispense: 90 tablet; Refill: 3  - **Basic metabolic panel FUTURE anytime; Future    2. Elevated cholesterol  Ordered lipid panel due to elevation last year for re-evaluation.  - Lipid panel reflex to direct LDL Fasting; Future    3. Seborrheic keratoses  Referral to dermatology for evaluation and treatment.  - DERMATOLOGY REFERRAL     See Patient Instructions    Return in about 1 year (around 7/5/2020) for Physical Exam.    Rafaela Carey NP  [facility]      "

## 2019-07-05 NOTE — PATIENT INSTRUCTIONS
1.  Make appointment for lab when fasting for labs that have been ordered.  2.  Refill given on losartan for 1 year.  3.  I will call you with lab results.  4.  I recommend that you choose a primary care provider and make appointment annually for preventative exam.  5.  Make appointment for dermatology for lesion on nose.

## 2019-07-11 DIAGNOSIS — I10 HYPERTENSION GOAL BP (BLOOD PRESSURE) < 140/90: ICD-10-CM

## 2019-07-11 DIAGNOSIS — E78.00 ELEVATED CHOLESTEROL: ICD-10-CM

## 2019-07-11 LAB
ANION GAP SERPL CALCULATED.3IONS-SCNC: 6 MMOL/L (ref 3–14)
BUN SERPL-MCNC: 24 MG/DL (ref 7–30)
CALCIUM SERPL-MCNC: 9 MG/DL (ref 8.5–10.1)
CHLORIDE SERPL-SCNC: 106 MMOL/L (ref 94–109)
CHOLEST SERPL-MCNC: 206 MG/DL
CO2 SERPL-SCNC: 26 MMOL/L (ref 20–32)
CREAT SERPL-MCNC: 1.1 MG/DL (ref 0.66–1.25)
GFR SERPL CREATININE-BSD FRML MDRD: 69 ML/MIN/{1.73_M2}
GLUCOSE SERPL-MCNC: 93 MG/DL (ref 70–99)
HDLC SERPL-MCNC: 49 MG/DL
LDLC SERPL CALC-MCNC: 121 MG/DL
NONHDLC SERPL-MCNC: 157 MG/DL
POTASSIUM SERPL-SCNC: 4.3 MMOL/L (ref 3.4–5.3)
SODIUM SERPL-SCNC: 138 MMOL/L (ref 133–144)
TRIGL SERPL-MCNC: 182 MG/DL

## 2019-07-11 PROCEDURE — 80061 LIPID PANEL: CPT | Performed by: NURSE PRACTITIONER

## 2019-07-11 PROCEDURE — 36415 COLL VENOUS BLD VENIPUNCTURE: CPT | Performed by: NURSE PRACTITIONER

## 2019-07-11 PROCEDURE — 80048 BASIC METABOLIC PNL TOTAL CA: CPT | Performed by: NURSE PRACTITIONER

## 2019-07-12 DIAGNOSIS — E78.5 HYPERLIPIDEMIA LDL GOAL <130: Primary | ICD-10-CM

## 2019-07-12 RX ORDER — SIMVASTATIN 40 MG
40 TABLET ORAL AT BEDTIME
Qty: 30 TABLET | Refills: 1 | Status: SHIPPED | OUTPATIENT
Start: 2019-07-12 | End: 2020-06-11

## 2019-07-12 NOTE — PROGRESS NOTES
[place] risk factor is 18.4% based on treated BP, no diabetes, age and cholesterol/HDL.  Recommendations are to start statin drug.  I have ordered this medication to your pharmacy at [facility].  Recommend follow-up with lab in 6 weeks for recheck, if improvement will refill for 1 year. If any side effects to the medication, he will need to follow-up in clinic for medication change.  Rafaela Carey NP on 7/12/2019 at 9:37 AM

## 2019-09-04 ENCOUNTER — TELEPHONE (OUTPATIENT)
Dept: DERMATOLOGY | Facility: CLINIC | Age: 69
End: 2019-09-04

## 2019-09-04 ENCOUNTER — OFFICE VISIT (OUTPATIENT)
Dept: DERMATOLOGY | Facility: CLINIC | Age: 69
End: 2019-09-04
Payer: COMMERCIAL

## 2019-09-04 ENCOUNTER — OFFICE VISIT (OUTPATIENT)
Dept: DERMATOLOGY | Facility: CLINIC | Age: 69
End: 2019-09-04
Attending: NURSE PRACTITIONER
Payer: COMMERCIAL

## 2019-09-04 VITALS — SYSTOLIC BLOOD PRESSURE: 128 MMHG | OXYGEN SATURATION: 99 % | DIASTOLIC BLOOD PRESSURE: 77 MMHG | HEART RATE: 71 BPM

## 2019-09-04 DIAGNOSIS — D18.01 CHERRY ANGIOMA: ICD-10-CM

## 2019-09-04 DIAGNOSIS — D48.5 NEOPLASM OF UNCERTAIN BEHAVIOR OF SKIN: Primary | ICD-10-CM

## 2019-09-04 DIAGNOSIS — L82.1 SEBORRHEIC KERATOSIS: ICD-10-CM

## 2019-09-04 DIAGNOSIS — L57.0 AK (ACTINIC KERATOSIS): Primary | ICD-10-CM

## 2019-09-04 PROCEDURE — 88331 PATH CONSLTJ SURG 1 BLK 1SPC: CPT | Performed by: DERMATOLOGY

## 2019-09-04 PROCEDURE — 11102 TANGNTL BX SKIN SINGLE LES: CPT | Performed by: PHYSICIAN ASSISTANT

## 2019-09-04 PROCEDURE — 99214 OFFICE O/P EST MOD 30 MIN: CPT | Mod: 25 | Performed by: PHYSICIAN ASSISTANT

## 2019-09-04 NOTE — PATIENT INSTRUCTIONS
Wound Care Instructions     FOR SUPERFICIAL WOUNDS     [facility] [phone]    [facility] [phone]                       AFTER 24 HOURS YOU SHOULD REMOVE THE BANDAGE AND BEGIN DAILY DRESSING CHANGES AS FOLLOWS:     1) Remove Dressing.     2) Clean and dry the area with tap water using a Q-tip or sterile gauze pad.     3) Apply Vaseline, Aquaphor, Polysporin ointment or Bacitracin ointment over entire wound.  Do NOT use Neosporin ointment.     4) Cover the wound with a band-aid, or a sterile non-stick gauze pad and micropore paper tape      REPEAT THESE INSTRUCTIONS AT LEAST ONCE A DAY UNTIL THE WOUND HAS COMPLETELY HEALED.    It is an old wives tale that a wound heals better when it is exposed to air and allowed to dry out. The wound will heal faster with a better cosmetic result if it is kept moist with ointment and covered with a bandage.    **Do not let the wound dry out.**      Supplies Needed:      *Cotton tipped applicators (Q-tips)    *Polysporin Ointment or Bacitracin Ointment (NOT NEOSPORIN)    *Band-aids or non-stick gauze pads and micropore paper tape.      PATIENT INFORMATION:    During the healing process you will notice a number of changes. All wounds develop a small halo of redness surrounding the wound.  This means healing is occurring. Severe itching with extensive redness usually indicates sensitivity to the ointment or bandage tape used to dress the wound.  You should call our office if this develops.      Swelling  and/or discoloration around your surgical site is common, particularly when performed around the eye.    All wounds normally drain.  The larger the wound the more drainage there will be.  After 7-10 days, you will notice the wound beginning to shrink and new skin will begin to grow.  The wound is healed when you can see skin has formed over the entire area.  A healed wound has a healthy, shiny look to the surface and is red to dark pink in color  to normalize.  Wounds may take approximately 4-6 weeks to heal.  Larger wounds may take 6-8 weeks.  After the wound is healed you may discontinue dressing changes.    You may experience a sensation of tightness as your wound heals. This is normal and will gradually subside.    Your healed wound may be sensitive to temperature changes. This sensitivity improves with time, but if you re having a lot of discomfort, try to avoid temperature extremes.    Patients frequently experience itching after their wound appears to have healed because of the continue healing under the skin.  Plain Vaseline will help relieve the itching.        POSSIBLE COMPLICATIONS    BLEEDIN. Leave the bandage in place.  2. Use tightly rolled up gauze or a cloth to apply direct pressure over the bandage for 30  minutes.  3. Reapply pressure for an additional 30 minutes if necessary  4. Use additional gauze and tape to maintain pressure once the bleeding has stopped.

## 2019-09-04 NOTE — PROGRESS NOTES
L ala:There is hyperkeratosis and focal parakeratosis of the epidermis, overlying atypical keratinocytes,  by areas of orthokeratosis, there are scattered basal atypical keratinocytes: with varying degrees of overlying loss of maturation, hyperchromatism, pleomorphism, increased and abnormal mitoses, dyskeratosis.  The dermis shows a variable superficial inflammatory infiltrate.     L ala actinic keratosis cryo

## 2019-09-04 NOTE — TELEPHONE ENCOUNTER
----- Message from Johan Israel MD sent at 9/4/2019 11:42 AM CDT -----  L ala actinic keratosis cryo

## 2019-09-04 NOTE — PROGRESS NOTES
Jaime Saucedo is a 69 year old year old male patient, I was asked to see by Rafaela Carey CNP for spot on nose.  Patient states this has been present for a year.  Patient reports the following symptoms:  Sensitive to touch.  Patient reports the following previous treatments cryo a few years ago.  Patient reports the following modifying factors none.  Associated symptoms: none.  Patient has no other skin complaints today.  Remainder of the HPI, Meds, PMH, Allergies, FH, and SH was reviewed in chart.    History reviewed. No pertinent past medical history.    Past Surgical History:   Procedure Laterality Date     ARTHROSCOPY KNEE RT/LT Bilateral 2006    partial meniscectomy     PHACOEMULSIFICATION WITH STANDARD INTRAOCULAR LENS IMPLANT Left 3/6/2019    Procedure: Cataract Removal with Implant;  Surgeon: Johan Anthony MD;  Location: [facility] OR        Family History   Problem Relation Age of Onset     Hypertension Mother      Hypertension Father        Social History     Socioeconomic History     Marital status:      Spouse name: Not on file     Number of children: Not on file     Years of education: Not on file     Highest education level: Not on file   Occupational History     Not on file   Social Needs     Financial resource strain: Not on file     Food insecurity:     Worry: Not on file     Inability: Not on file     Transportation needs:     Medical: Not on file     Non-medical: Not on file   Tobacco Use     Smoking status: Never Smoker     Smokeless tobacco: Never Used   Substance and Sexual Activity     Alcohol use: Yes     Comment: rare     Drug use: No     Sexual activity: Never   Lifestyle     Physical activity:     Days per week: Not on file     Minutes per session: Not on file     Stress: Not on file   Relationships     Social connections:     Talks on phone: Not on file     Gets together: Not on file     Attends Worship service: Not on file     Active member of club or organization: Not on  file     Attends meetings of clubs or organizations: Not on file     Relationship status: Not on file     Intimate partner violence:     Fear of current or ex partner: Not on file     Emotionally abused: Not on file     Physically abused: Not on file     Forced sexual activity: Not on file   Other Topics Concern     Parent/sibling w/ CABG, MI or angioplasty before 65F 55M? Not Asked   Social History Narrative     Not on file       Outpatient Encounter Medications as of 9/4/2019   Medication Sig Dispense Refill     losartan (COZAAR) 25 MG tablet Take 1 tablet (25 mg) by mouth daily 90 tablet 3     simvastatin (ZOCOR) 40 MG tablet Take 1 tablet (40 mg) by mouth At Bedtime (Patient not taking: Reported on 9/4/2019) 30 tablet 1     No facility-administered encounter medications on file as of 9/4/2019.              Review Of Systems  Skin: As above  Eyes: negative  Ears/Nose/Throat: negative  Respiratory: No shortness of breath, dyspnea on exertion, cough, or hemoptysis  Cardiovascular: negative  Gastrointestinal: negative  Genitourinary: negative  Musculoskeletal: negative  Neurologic: negative  Psychiatric: negative  Hematologic/Lymphatic/Immunologic: negative  Endocrine: negative      O:   NAD, WDWN, Alert & Oriented, Mood & Affect wnl, Vitals stable   Here today alone   /77   Pulse 71   SpO2 99%    General appearance normal   Vitals stable   Alert, oriented and in no acute distress     Brown stuck on papules on face  Red papules on stomach  0.6 cm pink scaly papule on left nasal ala       Eyes: Conjunctivae/lids:Normal     ENT: Lips: normal    MSK:Normal    Pulm: Breathing Normal    Neuro/Psych: Orientation:Normal; Mood/Affect:Normal  A/P:  1. R/O AK vs SCC on left nasal ala   TANGENTIAL BIOPSY IN HOUSE:  After consent, anesthesia with LEC and prep, tangential excision performed.  No complications and routine wound care.    2. Seborrheic keratosis, cherry angioma  BENIGN LESIONS DISCUSSED WITH PATIENT:  I  discussed the specifics of tumor, prognosis, and genetics of benign lesions.  I explained that treatment of these lesions would be purely cosmetic and not medically neccessary.  I discussed with patient different removal options including excision, cautery and /or laser.      Nature and genetics of benign skin lesions dicussed with patient.  Signs and Symptoms of skin cancer discussed with patient.  ABCDEs of melanoma reviewed with patient.  Patient encouraged to perform monthly skin exams.  UV precautions reviewed with patient.  Risks of non-melanoma skin cancer discussed with patient   Return to clinic pending biopsy results.

## 2019-09-04 NOTE — LETTER
9/4/2019         RE: Jaime Saucedo  [address] [facility] [address]        Dear Colleague,    Thank you for referring your patient, Jaime Saucedo, to the [facility]. Please see a copy of my visit note below.    L ala:There is hyperkeratosis and focal parakeratosis of the epidermis, overlying atypical keratinocytes,  by areas of orthokeratosis, there are scattered basal atypical keratinocytes: with varying degrees of overlying loss of maturation, hyperchromatism, pleomorphism, increased and abnormal mitoses, dyskeratosis.  The dermis shows a variable superficial inflammatory infiltrate.     L ala actinic keratosis cryo       Again, thank you for allowing me to participate in the care of your patient.        Sincerely,        Johan Israel MD

## 2019-09-04 NOTE — LETTER
9/4/2019         RE: Jaime Saucedo  [address] [facility] [address]        Dear Colleague,    Thank you for referring your patient, Jaime Saucedo, to the [facility]. Please see a copy of my visit note below.    Jaime Saucedo is a 69 year old year old male patient, I was asked to see by Rafaela Carey CNP for spot on nose.  Patient states this has been present for a year.  Patient reports the following symptoms:  Sensitive to touch.  Patient reports the following previous treatments cryo a few years ago.  Patient reports the following modifying factors none.  Associated symptoms: none.  Patient has no other skin complaints today.  Remainder of the HPI, Meds, PMH, Allergies, FH, and SH was reviewed in chart.    History reviewed. No pertinent past medical history.    Past Surgical History:   Procedure Laterality Date     ARTHROSCOPY KNEE RT/LT Bilateral 2006    partial meniscectomy     PHACOEMULSIFICATION WITH STANDARD INTRAOCULAR LENS IMPLANT Left 3/6/2019    Procedure: Cataract Removal with Implant;  Surgeon: Johan Anthony MD;  Location: [facility] OR        Family History   Problem Relation Age of Onset     Hypertension Mother      Hypertension Father        Social History     Socioeconomic History     Marital status:      Spouse name: Not on file     Number of children: Not on file     Years of education: Not on file     Highest education level: Not on file   Occupational History     Not on file   Social Needs     Financial resource strain: Not on file     Food insecurity:     Worry: Not on file     Inability: Not on file     Transportation needs:     Medical: Not on file     Non-medical: Not on file   Tobacco Use     Smoking status: Never Smoker     Smokeless tobacco: Never Used   Substance and Sexual Activity     Alcohol use: Yes     Comment: rare     Drug use: No     Sexual activity: Never   Lifestyle     Physical activity:     Days per week: Not on file     Minutes  per session: Not on file     Stress: Not on file   Relationships     Social connections:     Talks on phone: Not on file     Gets together: Not on file     Attends Restorationism service: Not on file     Active member of club or organization: Not on file     Attends meetings of clubs or organizations: Not on file     Relationship status: Not on file     Intimate partner violence:     Fear of current or ex partner: Not on file     Emotionally abused: Not on file     Physically abused: Not on file     Forced sexual activity: Not on file   Other Topics Concern     Parent/sibling w/ CABG, MI or angioplasty before 65F 55M? Not Asked   Social History Narrative     Not on file       Outpatient Encounter Medications as of 9/4/2019   Medication Sig Dispense Refill     losartan (COZAAR) 25 MG tablet Take 1 tablet (25 mg) by mouth daily 90 tablet 3     simvastatin (ZOCOR) 40 MG tablet Take 1 tablet (40 mg) by mouth At Bedtime (Patient not taking: Reported on 9/4/2019) 30 tablet 1     No facility-administered encounter medications on file as of 9/4/2019.              Review Of Systems  Skin: As above  Eyes: negative  Ears/Nose/Throat: negative  Respiratory: No shortness of breath, dyspnea on exertion, cough, or hemoptysis  Cardiovascular: negative  Gastrointestinal: negative  Genitourinary: negative  Musculoskeletal: negative  Neurologic: negative  Psychiatric: negative  Hematologic/Lymphatic/Immunologic: negative  Endocrine: negative      O:   NAD, WDWN, Alert & Oriented, Mood & Affect wnl, Vitals stable   Here today alone   /77   Pulse 71   SpO2 99%    General appearance normal   Vitals stable   Alert, oriented and in no acute distress     Brown stuck on papules on face  Red papules on stomach  0.6 cm pink scaly papule on left nasal ala       Eyes: Conjunctivae/lids:Normal     ENT: Lips: normal    MSK:Normal    Pulm: Breathing Normal    Neuro/Psych: Orientation:Normal; Mood/Affect:Normal  A/P:  1. R/O AK vs SCC on left  nasal ala   TANGENTIAL BIOPSY IN HOUSE:  After consent, anesthesia with LEC and prep, tangential excision performed.  No complications and routine wound care.    2. Seborrheic keratosis, cherry angioma  BENIGN LESIONS DISCUSSED WITH PATIENT:  I discussed the specifics of tumor, prognosis, and genetics of benign lesions.  I explained that treatment of these lesions would be purely cosmetic and not medically neccessary.  I discussed with patient different removal options including excision, cautery and /or laser.      Nature and genetics of benign skin lesions dicussed with patient.  Signs and Symptoms of skin cancer discussed with patient.  ABCDEs of melanoma reviewed with patient.  Patient encouraged to perform monthly skin exams.  UV precautions reviewed with patient.  Risks of non-melanoma skin cancer discussed with patient   Return to clinic pending biopsy results.     Again, thank you for allowing me to participate in the care of your patient.        Sincerely,        Bindu Neal PA-C

## 2019-09-05 NOTE — TELEPHONE ENCOUNTER
Spoke with patient and reviewed results. Pt verbalized understanding. Appt made for cryo.  Ines GUERRERO RN BSN PHN  Specialty Clinics

## 2019-10-24 ENCOUNTER — OFFICE VISIT (OUTPATIENT)
Dept: DERMATOLOGY | Facility: CLINIC | Age: 69
End: 2019-10-24
Payer: COMMERCIAL

## 2019-10-24 DIAGNOSIS — L57.0 ACTINIC KERATOSIS: Primary | ICD-10-CM

## 2019-10-24 PROCEDURE — 17000 DESTRUCT PREMALG LESION: CPT | Performed by: PHYSICIAN ASSISTANT

## 2019-10-24 NOTE — PROGRESS NOTES
Patient is here today to treat biopsy proven actinic keratosis on left nasal ala.   Here today for cryo  1. Actinic keratosis on left nasal ala x 1  LN2:  Treated with LN2 for 5s for 1-2 cycles. Warned risks of blistering, pain, pigment change, scarring, and incomplete resolution.  Advised patient to return if lesions do not completely resolve.  Wound care sheet given.

## 2019-10-24 NOTE — LETTER
10/24/2019         RE: Jaime Saucedo  [address] [facility] [address]        Dear Colleague,    Thank you for referring your patient, Jaime Saucedo, to the [facility]. Please see a copy of my visit note below.    Patient is here today to treat biopsy proven actinic keratosis on left nasal ala.   Here today for cryo  1. Actinic keratosis on left nasal ala x 1  LN2:  Treated with LN2 for 5s for 1-2 cycles. Warned risks of blistering, pain, pigment change, scarring, and incomplete resolution.  Advised patient to return if lesions do not completely resolve.  Wound care sheet given.      Again, thank you for allowing me to participate in the care of your patient.        Sincerely,        Bindu Neal PA-C

## 2020-02-12 ENCOUNTER — OFFICE VISIT (OUTPATIENT)
Dept: FAMILY MEDICINE | Facility: CLINIC | Age: 70
End: 2020-02-12
Payer: COMMERCIAL

## 2020-02-12 VITALS
OXYGEN SATURATION: 97 % | TEMPERATURE: 96 F | DIASTOLIC BLOOD PRESSURE: 72 MMHG | BODY MASS INDEX: 30.32 KG/M2 | SYSTOLIC BLOOD PRESSURE: 136 MMHG | HEART RATE: 74 BPM | RESPIRATION RATE: 16 BRPM | WEIGHT: 183.6 LBS

## 2020-02-12 DIAGNOSIS — Z23 NEED FOR VACCINATION: ICD-10-CM

## 2020-02-12 DIAGNOSIS — H69.92 DYSFUNCTION OF LEFT EUSTACHIAN TUBE: Primary | ICD-10-CM

## 2020-02-12 PROCEDURE — 99213 OFFICE O/P EST LOW 20 MIN: CPT | Mod: 25 | Performed by: FAMILY MEDICINE

## 2020-02-12 PROCEDURE — 90471 IMMUNIZATION ADMIN: CPT | Performed by: FAMILY MEDICINE

## 2020-02-12 PROCEDURE — 90715 TDAP VACCINE 7 YRS/> IM: CPT | Performed by: FAMILY MEDICINE

## 2020-02-12 RX ORDER — FLUTICASONE PROPIONATE 50 MCG
2 SPRAY, SUSPENSION (ML) NASAL DAILY
Qty: 16 G | Refills: 0 | Status: SHIPPED | OUTPATIENT
Start: 2020-02-12 | End: 2020-06-11

## 2020-02-12 NOTE — PATIENT INSTRUCTIONS
Our Clinic hours are:  Mondays    7:20 am - 7 pm  Tues -  Fri  7:20 am - 5 pm    Clinic Phone: [phone]    The clinic lab opens at 7:30 am Mon - Fri and appointments are required.    [facility]. [phone]  Monday  8 am - 7pm  Tues - Fri 8 am - 5:30 pm

## 2020-02-12 NOTE — PROGRESS NOTES
Subjective     Jaime Saucedo is a 69 year old male who presents to clinic today for the following health issues:    HPI   Chief Complaint   Patient presents with     Ear Problem     left ear clogged will not drain     ADDITIONAL HPI: 69 year old male here for above issue.     ROS: 10 point review of systems negative except as per HPI.    PAST MEDICAL HISTORY:  History reviewed. No pertinent past medical history.     ACTIVE MEDICAL PROBLEMS:  Patient Active Problem List   Diagnosis     Hypertension goal BP (blood pressure) < 140/90     CARDIOVASCULAR SCREENING; LDL GOAL LESS THAN 160        FAMILY HISTORY:  Family History   Problem Relation Age of Onset     Hypertension Mother      Hypertension Father        SOCIAL HISTORY:  Social History     Socioeconomic History     Marital status:      Spouse name: Not on file     Number of children: Not on file     Years of education: Not on file     Highest education level: Not on file   Occupational History     Not on file   Social Needs     Financial resource strain: Not on file     Food insecurity:     Worry: Not on file     Inability: Not on file     Transportation needs:     Medical: Not on file     Non-medical: Not on file   Tobacco Use     Smoking status: Never Smoker     Smokeless tobacco: Never Used   Substance and Sexual Activity     Alcohol use: Yes     Comment: rare     Drug use: No     Sexual activity: Yes   Lifestyle     Physical activity:     Days per week: Not on file     Minutes per session: Not on file     Stress: Not on file   Relationships     Social connections:     Talks on phone: Not on file     Gets together: Not on file     Attends Pentecostalism service: Not on file     Active member of club or organization: Not on file     Attends meetings of clubs or organizations: Not on file     Relationship status: Not on file     Intimate partner violence:     Fear of current or ex partner: Not on file     Emotionally abused: Not on file     Physically  abused: Not on file     Forced sexual activity: Not on file   Other Topics Concern     Parent/sibling w/ CABG, MI or angioplasty before 65F 55M? Not Asked   Social History Narrative     Not on file       MEDICATIONS:  Current Outpatient Medications   Medication Sig Dispense Refill     fluticasone (FLONASE) 50 MCG/ACT nasal spray Spray 2 sprays in nostril daily 16 g 0     losartan (COZAAR) 25 MG tablet Take 1 tablet (25 mg) by mouth daily 90 tablet 3     simvastatin (ZOCOR) 40 MG tablet Take 1 tablet (40 mg) by mouth At Bedtime (Patient not taking: Reported on 9/4/2019) 30 tablet 1       ALLERGIES:   No Known Allergies    Problem list, Medication list, Allergies, and Medical/Social/Surgical histories reviewed in [facility] and updated as appropriate.    OBJECTIVE:                                                    VITALS: /72 (Cuff Size: Adult Regular)   Pulse 74   Temp 96  F (35.6  C) (Tympanic)   Resp 16   Wt 83.3 kg (183 lb 9.6 oz)   SpO2 97%   BMI 30.32 kg/m   Body mass index is 30.32 kg/m .  GENERAL: Pleasant, well appearing male.  HEENT: PERRL, EOMI, oropharynx clear. Left TM: serous effusion. Right TM: serous effusion.  NECK: supple, no thyromegaly or thyroid masses, no lymphadenopathy.  CV: RRR, no murmurs, rubs or gallops.  LUNGS: Clear to auscultation bilaterally, normal effort.  SKIN: warm and dry without obvious rashes.     ASSESSMENT/PLAN:                                                    1. Dysfunction of left eustachian tube  Trial flonase and valsalva. Follow-up if not improving or if worsening.   - fluticasone (FLONASE) 50 MCG/ACT nasal spray; Spray 2 sprays in nostril daily  Dispense: 16 g; Refill: 0    2. Need for vaccination  - TDAP VACCINE (ADACEL) [49519.002]

## 2020-05-29 ENCOUNTER — TRANSFERRED RECORDS (OUTPATIENT)
Dept: HEALTH INFORMATION MANAGEMENT | Facility: CLINIC | Age: 70
End: 2020-05-29

## 2020-06-11 ENCOUNTER — ALLIED HEALTH/NURSE VISIT (OUTPATIENT)
Dept: FAMILY MEDICINE | Facility: CLINIC | Age: 70
End: 2020-06-11
Payer: COMMERCIAL

## 2020-06-11 ENCOUNTER — TELEPHONE (OUTPATIENT)
Dept: FAMILY MEDICINE | Facility: CLINIC | Age: 70
End: 2020-06-11

## 2020-06-11 VITALS — SYSTOLIC BLOOD PRESSURE: 112 MMHG | DIASTOLIC BLOOD PRESSURE: 64 MMHG | HEART RATE: 76 BPM | RESPIRATION RATE: 16 BRPM

## 2020-06-11 DIAGNOSIS — I10 HYPERTENSION GOAL BP (BLOOD PRESSURE) < 140/90: Primary | ICD-10-CM

## 2020-06-11 PROCEDURE — 99207 ZZC NO CHARGE NURSE ONLY: CPT

## 2020-06-11 NOTE — PROGRESS NOTES
"Jaime Saucedo is a 70 year old year old patient who comes in today for a Blood Pressure check because of ongoing blood pressure monitoring, had reported lower BP reading when at eye doctor 105 systolic but unknown diastolic. .  Vital Signs as repeated by RN Once.   Patient is taking medication as prescribed  Patient is tolerating medications well.  Patient is not monitoring Blood Pressure at home.   Current complaints: reports hearing heart beat in left ear when laying down. Dizziness upon standing if bending over and stands quickly.   Advised appointment next week with Dr. Gautam for \"whooshing\" sound in left ear present x 1 week.     Vital Signs 6/11/2020   Systolic 112   Diastolic 64   Pulse 76   Temperature    Respirations 16     Disposition:  Routed to provider for review and advisement.     "

## 2020-06-11 NOTE — TELEPHONE ENCOUNTER
"Jaime Saucedo is a 70 year old year old patient who comes in today for a Blood Pressure check because of ongoing blood pressure monitoring, had reported lower BP reading when at eye doctor 105 systolic but unknown diastolic. Patient had concerns that the number was too low, saw his eye doctor about 10 days ago .  Vital Signs as repeated by RN Once.   Patient is taking medication as prescribed  Patient is tolerating medications well.  Patient is not monitoring Blood Pressure at home.   Current complaints: reports hearing heart beat in left ear when laying down. Dizziness upon standing if bending over and stands quickly.   Advised appointment next week with Dr. Gautam for \"whooshing\" sound in left ear present x 1 week. Scheduled for 6/18/20 with Provider, pt declined sooner appointment.     Vital Signs 6/11/2020   Systolic 112   Diastolic 64   Pulse 76   Temperature    Respirations 16     Disposition:  Routed to provider for review and advisement.   "

## 2020-06-11 NOTE — TELEPHONE ENCOUNTER
Blood pressure is on the low-omari side.  I would suggest he reduce losartan dose to 1/2 tab daily and then we'll re-check blood pressure and symptoms at his office visit and adjust further from there if needed.

## 2020-06-18 ENCOUNTER — OFFICE VISIT (OUTPATIENT)
Dept: FAMILY MEDICINE | Facility: CLINIC | Age: 70
End: 2020-06-18
Payer: COMMERCIAL

## 2020-06-18 VITALS
RESPIRATION RATE: 16 BRPM | HEART RATE: 76 BPM | HEIGHT: 65 IN | OXYGEN SATURATION: 98 % | SYSTOLIC BLOOD PRESSURE: 120 MMHG | TEMPERATURE: 97.3 F | WEIGHT: 170.2 LBS | DIASTOLIC BLOOD PRESSURE: 60 MMHG | BODY MASS INDEX: 28.36 KG/M2

## 2020-06-18 DIAGNOSIS — H93.12 TINNITUS, LEFT: ICD-10-CM

## 2020-06-18 DIAGNOSIS — I10 HYPERTENSION GOAL BP (BLOOD PRESSURE) < 140/90: Primary | ICD-10-CM

## 2020-06-18 PROCEDURE — 99214 OFFICE O/P EST MOD 30 MIN: CPT | Performed by: FAMILY MEDICINE

## 2020-06-18 ASSESSMENT — MIFFLIN-ST. JEOR: SCORE: 1472.86

## 2020-06-18 ASSESSMENT — PAIN SCALES - GENERAL: PAINLEVEL: NO PAIN (0)

## 2020-06-18 NOTE — PATIENT INSTRUCTIONS
Our Clinic hours are:  Mondays    7:20 am - 7 pm  Tues -  Fri  7:20 am - 5 pm    Clinic Phone: [phone]    The clinic lab opens at 7:30 am Mon - Fri and appointments are required.    [facility]. [phone]  Monday  8 am - 7pm  Tues - Fri 8 am - 5:30 pm

## 2020-06-18 NOTE — PROGRESS NOTES
"Subjective     Jaime Saucedo is a 70 year old male who presents to clinic today for the following health issues:    HPI   Problem:   Chief Complaint   Patient presents with     Blood Pressure     concerned with low blood pressure. Reading at eye doctor was 105/?     Ear Problem     Left ear \"swooshing\" sound x 2 -3 week. No pain. No loss of hearing.           Patient Active Problem List   Diagnosis     Hypertension goal BP (blood pressure) < 140/90     CARDIOVASCULAR SCREENING; LDL GOAL LESS THAN 160     Past Surgical History:   Procedure Laterality Date     ARTHROSCOPY KNEE RT/LT Bilateral 2006    partial meniscectomy     PHACOEMULSIFICATION WITH STANDARD INTRAOCULAR LENS IMPLANT Left 3/6/2019    Procedure: Cataract Removal with Implant;  Surgeon: Johan Anthony MD;  Location: [place] OR       Social History     Tobacco Use     Smoking status: Never Smoker     Smokeless tobacco: Never Used   Substance Use Topics     Alcohol use: Yes     Comment: rare     Family History   Problem Relation Age of Onset     Hypertension Mother      Hypertension Father          Current Outpatient Medications   Medication Sig Dispense Refill     losartan (COZAAR) 25 MG tablet Take 1 tablet (25 mg) by mouth daily 90 tablet 3     No Known Allergies    Reviewed and updated as needed this visit by Provider         Review of Systems   Constitutional, neuro, ENT, endocrine, pulmonary, cardiac, gastrointestinal, genitourinary, musculoskeletal, integument and psychiatric systems are negative, except as otherwise noted.       Objective    There were no vitals taken for this visit.  There is no height or weight on file to calculate BMI.  Physical Exam   GENERAL: Pleasant, well appearing male.  HEENT: PERRL, EOMI, TMs normal, oropharynx clear.             Assessment & Plan     1. Hypertension goal BP (blood pressure) < 140/90  Improved orthostasis symptoms with reduction in losartan dose and slight raise in blood pressure. Continue to " "monitor. He reports 25lb weight loss due to dieting efforts and discussed this may require further reduction in joyce medications.    2. Tinnitus, left  Possibly related to recent blood pressure shift. Has been relatively short-lived. Discussed risks of more sinister vascular etiology with tinnitus that is pulsatile in nature. If this does not improvement within the next month or worsens the I would recommend CTA for further evaluation and management,.       BMI:   Estimated body mass index is 28.11 kg/m  as calculated from the following:    Height as of this encounter: 1.657 m (5' 5.25\").    Weight as of this encounter: 77.2 kg (170 lb 3.2 oz).   Weight management plan: Discussed healthy diet and exercise guidelines            No follow-ups on file.    Adalberto Gautam MD  [facility]    "

## 2020-07-17 ENCOUNTER — TELEPHONE (OUTPATIENT)
Dept: FAMILY MEDICINE | Facility: CLINIC | Age: 70
End: 2020-07-17

## 2020-07-17 NOTE — TELEPHONE ENCOUNTER
Please call and follow-up on the pulsating in his ear.  If this has not improved since I saw him then we should proceed with imaging as we discussed at his visit. If resolved then nothing further needs to be done.

## 2020-08-10 ENCOUNTER — VIRTUAL VISIT (OUTPATIENT)
Dept: FAMILY MEDICINE | Facility: OTHER | Age: 70
End: 2020-08-10

## 2020-08-10 NOTE — PROGRESS NOTES
"Date: 08/10/2020 10:23:43  Clinician: Buck Nagy  Clinician NPI: [id number]  Patient: Jaime Saucedo  Patient : 1950  Patient Address: [address] [place] [address]  Patient Phone: [phone]  Visit Protocol: URI  Patient Summary:  Jaime is a 70 year old ( : 1950 ) male who initiated a Visit for COVID-19 (Coronavirus) evaluation and screening. When asked the question \"Please sign me up to receive news, health information and promotions. \", Jaime responded \"No\".    When asked when his symptoms started, Jaime reported that he does not have any symptoms.   He denies taking antibiotic medication in the past month and having recent facial or sinus surgery in the past 60 days.    Pertinent COVID-19 (Coronavirus) information  In the past 14 days, Jaime has not worked in a congregate living setting.   He does not work or volunteer as healthcare worker or a  and does not work or volunteer in a healthcare facility.   Jaime also has not lived in a congregate living setting in the past 14 days. He does not live with a healthcare worker.   Jaime has had a close contact with a laboratory-confirmed COVID-19 patient in the last 14 days. He was not exposed at his work. Additional information about contact with COVID-19 (Coronavirus) patient as reported by the patient (free text): My 17 year old son had 3 of his baseball team test positive for Covid a week ago.  Does he need a test?    Patient reported they are not living in the same household with a COVID-19 positive patient.  Patient denies being in an enclosed space for greater than 15 minutes with a COVID-19 patient.  Since 2019, Jaime and has not had upper respiratory infection or influenza-like illness. Has not been diagnosed with lab-confirmed COVID-19 test   Pertinent medical history  Jaime needs a return to work/school note.   Weight: 135 lbs   Jaime does not smoke or use smokeless tobacco.   Additional information as " reported by the patient (free text): My son has no symptoms of covid - contact with 3 players who tested positive about 10 days ago.   Weight: 135 lbs    MEDICATIONS: No current medications, ALLERGIES: NKDA  Clinician Response:  Dear Jaime,   Based on your exposure to COVID-19 (coronavirus), we would like to test you for this virus.  1. Please call [phone] to schedule your visit. Explain that you were referred by [facility] to have a COVID-19 test. Be ready to [facility].  The following will serve as your written order for this COVID Test, ordered by me, for the indication of suspected COVID [Z20.828]: The test will be ordered in [employer], our electronic health record, after you are scheduled. It will show as ordered and authorized by Musa Saavedra MD.  Order: COVID-19 (coronavirus) PCR for ASYMPTOMATIC EXPOSURE testing from [facility].  If you know you have had close contact with someone who tested positive, you should be quarantined for 14 days after this exposure. You should stay in quarantine for the14 days even if the covid test is negative, the optimal time to test after exposure is 5-7 days from the exposure  Quarantine means   What should I do?  For safety, it's very important to follow these rules. Do this for 14 days after the date you were last exposed to the virus..  Stay home and away from others. Don't go to school or anywhere else. Generally quarantine means staying home from work but there are some exceptions to this. Please contact your workplace.   No hugging, kissing or shaking hands.  Don't let anyone visit.  Cover your mouth and nose with a mask, tissue or washcloth to avoid spreading germs.  Wash your hands and face often. Use soap and water.  What are the symptoms of COVID-19?  The most common symptoms are cough, fever and trouble breathing. Less common symptoms include headache, body aches, fatigue (feeling very tired), chills, sore throat, stuffy or runny nose, diarrhea (loose  poop), loss of taste or smell, belly pain, and nausea or vomiting (feeling sick to your stomach or throwing up).  After 14 days, if you have still don't have symptoms, you likely don't have this virus.  If you develop symptoms, follow these guidelines.  If you're normally healthy: Please start another [facility] visit to report your symptoms. Go to [email or web address].  If you have a serious health problem (like cancer, heart failure, an organ transplant or kidney disease): Call your specialty clinic. Let them know that you might have COVID-19.  2. When it's time for your COVID test:  Stay at least 6 feet away from others. (If someone will drive you to your test, stay in the backseat, as far away from the  as you can.)  Cover your mouth and nose with a mask, tissue or washcloth.  Go straight to the testing site. Don't make any stops on the way there or back.  Please note  Caregivers in these groups are at risk for severe illness due to COVID-19:  o People 65 years and older  o People who live in a nursing home or long-term care facility  o People with chronic disease (lung, heart, cancer, diabetes, kidney, liver, immunologic)  o People who have a weakened immune system, including those who:  Are in cancer treatment  Take medicine that weakens the immune system, such as corticosteroids  Had a bone marrow or organ transplant  Have an immune deficiency  Have poorly controlled HIV or AIDS  Are obese (body mass index of 40 or higher)  Smoke regularly  Where can I get more information?   [facility] -- About COVID-19: [email or web address]  CDC -- What to Do If You're Sick: www.cdc.gov/coronavirus/2019-ncov/about/steps-when-sick.html  CDC -- Ending Home Isolation: www.cdc.gov/coronavirus/2019-ncov/hcp/disposition-in-home-patients.html  CDC -- Caring for Someone: www.cdc.gov/coronavirus/2019-ncov/if-you-are-sick/care-for-someone.html  [facility] -- Interim Guidance for Hospital Discharge to Home:  www.health.[facility].[place].us/diseases/coronavirus/hcp/hospdischarge.pdf  [facility] clinical trials (COVID-19 research studies): clinicalaffairs.[facility]/umn-clinical-trials  Below are the COVID-19 hotlines at the [facility] of Health ([facility]). Interpreters are available.  For health questions: Call 228-740-4432 or 1-326.980.2613 (7 a.m. to 7 p.m.)  For questions about schools and childcare: Call 700-303-6874 or 1-783.487.8108 (7 a.m. to 7 p.m.)    Diagnosis: Contact with and (suspected) exposure to other viral communicable diseases  Diagnosis ICD: Z20.828

## 2020-11-03 DIAGNOSIS — I10 HYPERTENSION GOAL BP (BLOOD PRESSURE) < 140/90: ICD-10-CM

## 2020-11-03 NOTE — TELEPHONE ENCOUNTER
Routing refill request to provider for review/approval because:  Labs not current:    Potassium   Date Value Ref Range Status   07/11/2019 4.3 3.4 - 5.3 mmol/L Final     Creatinine   Date Value Ref Range Status   07/11/2019 1.10 0.66 - 1.25 mg/dL Final     Melva Norton RNC

## 2020-11-04 RX ORDER — LOSARTAN POTASSIUM 25 MG/1
25 TABLET ORAL DAILY
Qty: 90 TABLET | Refills: 0 | Status: SHIPPED | OUTPATIENT
Start: 2020-11-04 | End: 2021-04-08

## 2020-11-04 NOTE — TELEPHONE ENCOUNTER
Please notify patient that he is due for labs that are required for being on losartan.  I will refill for 3 months, but he will need to make a lab appointment to get this completed and then we can fill the rest of the year refills since he was seen in June 2020 for BP.  Rafaela aCrey NP on 11/4/2020 at 2:56 PM

## 2020-11-04 NOTE — TELEPHONE ENCOUNTER
Message left for patient to check the pharmacy.  Small refill until he completes his labs then the rest of the yr will be given. Ludivina JOY RN

## 2020-11-09 ENCOUNTER — VIRTUAL VISIT (OUTPATIENT)
Dept: FAMILY MEDICINE | Facility: CLINIC | Age: 70
End: 2020-11-09
Payer: COMMERCIAL

## 2020-11-09 DIAGNOSIS — R09.81 NASAL SINUS CONGESTION: ICD-10-CM

## 2020-11-09 DIAGNOSIS — H92.03 OTALGIA, BILATERAL: Primary | ICD-10-CM

## 2020-11-09 PROCEDURE — 99441 PR PHYSICIAN TELEPHONE EVALUATION 5-10 MIN: CPT | Mod: 95 | Performed by: NURSE PRACTITIONER

## 2020-11-09 RX ORDER — PSEUDOEPHEDRINE HCL 30 MG
30 TABLET ORAL DAILY PRN
Qty: 30 TABLET | Refills: 0 | Status: SHIPPED | OUTPATIENT
Start: 2020-11-09 | End: 2021-07-05

## 2020-11-09 NOTE — PROGRESS NOTES
"Jaime Saucedo is a 70 year old male who is being evaluated via a billable telephone visit.      The patient has been notified of following:     \"This telephone visit will be conducted via a call between you and your physician/provider. We have found that certain health care needs can be provided without the need for a physical exam.  This service lets us provide the care you need with a short phone conversation.  If a prescription is necessary we can send it directly to your pharmacy.  If lab work is needed we can place an order for that and you can then stop by our lab to have the test done at a later time.    Telephone visits are billed at different rates depending on your insurance coverage. During this emergency period, for some insurers they may be billed the same as an in-person visit.  Please reach out to your insurance provider with any questions.    If during the course of the call the physician/provider feels a telephone visit is not appropriate, you will not be charged for this service.\"    Patient has given verbal consent for Telephone visit?  Yes    What phone number would you like to be contacted at? [phone]     How would you like to obtain your AVS? Mail a copy    Subjective     Jaime Saucedo is a 70 year old male who presents via phone visit today for the following health issues:    HPI     Acute Illness  Acute illness concerns:   Onset/Duration: off and on for 6 weeks   Symptoms:  Fever: no  Chills/Sweats: no  Headache (location?): no  Sinus Pressure: YES  Conjunctivitis:  no  Ear Pain: YES: both ears, left is worse   Rhinorrhea: no  Congestion: no   Sore Throat: YES- 2 days ago but this is mostly gone now.   Cough: no  Wheeze: no  Decreased Appetite: no  Nausea: no  Vomiting: no  Diarrhea: no  Dysuria/Freq.: no  Dysuria or Hematuria: no  Fatigue/Achiness: no  Sick/Strep Exposure: no  Therapies tried and outcome: hyrdogen peroxide for his ear, debrox- not helpful       Review of Systems "   Constitutional, HEENT, cardiovascular, pulmonary, gi and gu systems are negative, except as otherwise noted.       Objective          Vitals:  No vitals were obtained today due to virtual visit.    healthy, alert and no distress  PSYCH: Alert and oriented times 3; coherent speech, normal   rate and volume, able to articulate logical thoughts, able   to abstract reason, no tangential thoughts, no hallucinations   or delusions  His affect is normal  RESP: No cough, no audible wheezing, able to talk in full sentences  Remainder of exam unable to be completed due to telephone visits            Assessment/Plan:    Assessment & Plan     Otalgia, bilateral  -possible ear infection  - pseudoePHEDrine (SUDAFED) 30 MG tablet; Take 1 tablet (30 mg) by mouth daily as needed for congestion  - amoxicillin-clavulanate (AUGMENTIN) 875-125 MG tablet; Take 1 tablet by mouth 2 times daily for 7 days  -follow up in 5-7 days if still no improvement     Nasal sinus congestion  -continue nasal saline irrigations and Flonase   - pseudoePHEDrine (SUDAFED) 30 MG tablet; Take 1 tablet (30 mg) by mouth daily as needed for congestion        See Patient Instructions    Return in about 1 week (around 11/16/2020), or if symptoms worsen or fail to improve.    EBER [facility]    Phone call duration:  6 minutes

## 2020-11-18 ENCOUNTER — TRANSFERRED RECORDS (OUTPATIENT)
Dept: HEALTH INFORMATION MANAGEMENT | Facility: CLINIC | Age: 70
End: 2020-11-18

## 2020-11-18 LAB
CHOLEST SERPL-MCNC: 255 MG/DL (ref 100–199)
CREAT SERPL-MCNC: 1.29 MG/DL (ref 0.72–1.25)
GFR SERPL CREATININE-BSD FRML MDRD: 55 ML/MIN/1.73M2
GLUCOSE SERPL-MCNC: 117 MG/DL (ref 65–100)
HDLC SERPL-MCNC: 43 MG/DL
LDLC SERPL CALC-MCNC: 173 MG/DL
NONHDLC SERPL-MCNC: 212 MG/DL
POTASSIUM SERPL-SCNC: 4.2 MMOL/L (ref 3.5–5)
TRIGL SERPL-MCNC: 195 MG/DL

## 2021-01-07 ENCOUNTER — TELEPHONE (OUTPATIENT)
Dept: FAMILY MEDICINE | Facility: CLINIC | Age: 71
End: 2021-01-07

## 2021-01-07 DIAGNOSIS — E78.5 HYPERLIPIDEMIA LDL GOAL <130: Primary | ICD-10-CM

## 2021-01-07 NOTE — TELEPHONE ENCOUNTER
Simvastatin 40 mg was ordered on 7/12/19 and was dc'd on 6/11/20 per pt wanting to do diet change and not medication.    Pt had labs done for eye surgery and Lipid labs are still elevated.  Chol 255.  Tri   195.  HDL 43.  LDL  173.  Non .  Ratio of 5.93.  Pt will mail in copy of labs for his chart.    Pt wanting new order for Simvastatin sent to [facility].    Order is pended.    Advise.  Chiara

## 2021-01-08 RX ORDER — SIMVASTATIN 40 MG
40 TABLET ORAL AT BEDTIME
Qty: 90 TABLET | Refills: 1 | Status: SHIPPED | OUTPATIENT
Start: 2021-01-08 | End: 2021-07-15

## 2021-01-08 NOTE — TELEPHONE ENCOUNTER
Pt is calling to check status of this order, please address, no need to call pt unless there are questions.

## 2021-03-26 ENCOUNTER — APPOINTMENT (OUTPATIENT)
Dept: CT IMAGING | Facility: CLINIC | Age: 71
DRG: 177 | End: 2021-03-26
Attending: FAMILY MEDICINE
Payer: COMMERCIAL

## 2021-03-26 ENCOUNTER — HOSPITAL ENCOUNTER (INPATIENT)
Facility: CLINIC | Age: 71
LOS: 3 days | Discharge: HOME | DRG: 177 | End: 2021-03-29
Attending: FAMILY MEDICINE | Admitting: INTERNAL MEDICINE
Payer: COMMERCIAL

## 2021-03-26 ENCOUNTER — APPOINTMENT (OUTPATIENT)
Dept: MRI IMAGING | Facility: CLINIC | Age: 71
DRG: 177 | End: 2021-03-26
Attending: FAMILY MEDICINE
Payer: COMMERCIAL

## 2021-03-26 DIAGNOSIS — H53.2 DIPLOPIA: ICD-10-CM

## 2021-03-26 DIAGNOSIS — J12.82 PNEUMONIA DUE TO 2019 NOVEL CORONAVIRUS: ICD-10-CM

## 2021-03-26 DIAGNOSIS — U07.1 PNEUMONIA DUE TO 2019 NOVEL CORONAVIRUS: ICD-10-CM

## 2021-03-26 DIAGNOSIS — R42 VERTIGO: ICD-10-CM

## 2021-03-26 LAB
ABO + RH BLD: NORMAL
ABO + RH BLD: NORMAL
ALBUMIN SERPL-MCNC: 3.2 G/DL (ref 3.4–5)
ALBUMIN SERPL-MCNC: 3.4 G/DL (ref 3.4–5)
ALP SERPL-CCNC: 84 U/L (ref 40–150)
ALP SERPL-CCNC: 93 U/L (ref 40–150)
ALT SERPL W P-5'-P-CCNC: 33 U/L (ref 0–70)
ALT SERPL W P-5'-P-CCNC: 34 U/L (ref 0–70)
ANION GAP SERPL CALCULATED.3IONS-SCNC: 12 MMOL/L (ref 3–14)
ANION GAP SERPL CALCULATED.3IONS-SCNC: 8 MMOL/L (ref 3–14)
APTT PPP: 32 SEC (ref 22–37)
APTT PPP: 36 SEC (ref 22–37)
AST SERPL W P-5'-P-CCNC: 45 U/L (ref 0–45)
AST SERPL W P-5'-P-CCNC: 45 U/L (ref 0–45)
BASOPHILS # BLD AUTO: 0 10E9/L (ref 0–0.2)
BASOPHILS # BLD AUTO: 0 10E9/L (ref 0–0.2)
BASOPHILS NFR BLD AUTO: 0 %
BASOPHILS NFR BLD AUTO: 0 %
BILIRUB SERPL-MCNC: 0.7 MG/DL (ref 0.2–1.3)
BILIRUB SERPL-MCNC: 0.8 MG/DL (ref 0.2–1.3)
BUN SERPL-MCNC: 20 MG/DL (ref 7–30)
BUN SERPL-MCNC: 21 MG/DL (ref 7–30)
CALCIUM SERPL-MCNC: 8.7 MG/DL (ref 8.5–10.1)
CALCIUM SERPL-MCNC: 8.9 MG/DL (ref 8.5–10.1)
CHLORIDE SERPL-SCNC: 100 MMOL/L (ref 94–109)
CHLORIDE SERPL-SCNC: 101 MMOL/L (ref 94–109)
CO2 SERPL-SCNC: 24 MMOL/L (ref 20–32)
CO2 SERPL-SCNC: 24 MMOL/L (ref 20–32)
CREAT SERPL-MCNC: 1.11 MG/DL (ref 0.66–1.25)
CREAT SERPL-MCNC: 1.14 MG/DL (ref 0.66–1.25)
CRP SERPL-MCNC: 87.5 MG/L (ref 0–8)
D DIMER PPP FEU-MCNC: 0.9 UG/ML FEU (ref 0–0.5)
DIFFERENTIAL METHOD BLD: ABNORMAL
DIFFERENTIAL METHOD BLD: ABNORMAL
EOSINOPHIL # BLD AUTO: 0 10E9/L (ref 0–0.7)
EOSINOPHIL # BLD AUTO: 0 10E9/L (ref 0–0.7)
EOSINOPHIL NFR BLD AUTO: 0 %
EOSINOPHIL NFR BLD AUTO: 0 %
ERYTHROCYTE [DISTWIDTH] IN BLOOD BY AUTOMATED COUNT: 11.6 % (ref 10–15)
ERYTHROCYTE [DISTWIDTH] IN BLOOD BY AUTOMATED COUNT: 11.6 % (ref 10–15)
FIBRINOGEN PPP-MCNC: 610 MG/DL (ref 200–420)
GFR SERPL CREATININE-BSD FRML MDRD: 65 ML/MIN/{1.73_M2}
GFR SERPL CREATININE-BSD FRML MDRD: 67 ML/MIN/{1.73_M2}
GLUCOSE BLDC GLUCOMTR-MCNC: 106 MG/DL (ref 70–99)
GLUCOSE SERPL-MCNC: 101 MG/DL (ref 70–99)
GLUCOSE SERPL-MCNC: 94 MG/DL (ref 70–99)
HCT VFR BLD AUTO: 42.5 % (ref 40–53)
HCT VFR BLD AUTO: 45.8 % (ref 40–53)
HGB BLD-MCNC: 15.2 G/DL (ref 13.3–17.7)
HGB BLD-MCNC: 16 G/DL (ref 13.3–17.7)
IMM GRANULOCYTES # BLD: 0 10E9/L (ref 0–0.4)
IMM GRANULOCYTES # BLD: 0 10E9/L (ref 0–0.4)
IMM GRANULOCYTES NFR BLD: 0.2 %
IMM GRANULOCYTES NFR BLD: 0.3 %
INR PPP: 1.05 (ref 0.86–1.14)
INR PPP: 1.05 (ref 0.86–1.14)
LACTATE BLD-SCNC: 1.8 MMOL/L (ref 0.7–2)
LDH SERPL L TO P-CCNC: 379 U/L (ref 85–227)
LYMPHOCYTES # BLD AUTO: 0.5 10E9/L (ref 0.8–5.3)
LYMPHOCYTES # BLD AUTO: 0.5 10E9/L (ref 0.8–5.3)
LYMPHOCYTES NFR BLD AUTO: 15.1 %
LYMPHOCYTES NFR BLD AUTO: 9.8 %
MCH RBC QN AUTO: 29.7 PG (ref 26.5–33)
MCH RBC QN AUTO: 30.3 PG (ref 26.5–33)
MCHC RBC AUTO-ENTMCNC: 34.9 G/DL (ref 31.5–36.5)
MCHC RBC AUTO-ENTMCNC: 35.8 G/DL (ref 31.5–36.5)
MCV RBC AUTO: 85 FL (ref 78–100)
MCV RBC AUTO: 85 FL (ref 78–100)
MONOCYTES # BLD AUTO: 0.3 10E9/L (ref 0–1.3)
MONOCYTES # BLD AUTO: 0.4 10E9/L (ref 0–1.3)
MONOCYTES NFR BLD AUTO: 8 %
MONOCYTES NFR BLD AUTO: 9.7 %
NEUTROPHILS # BLD AUTO: 2.6 10E9/L (ref 1.6–8.3)
NEUTROPHILS # BLD AUTO: 4 10E9/L (ref 1.6–8.3)
NEUTROPHILS NFR BLD AUTO: 74.9 %
NEUTROPHILS NFR BLD AUTO: 82 %
NRBC # BLD AUTO: 0 10*3/UL
NRBC # BLD AUTO: 0 10*3/UL
NRBC BLD AUTO-RTO: 0 /100
NRBC BLD AUTO-RTO: 0 /100
PLATELET # BLD AUTO: 142 10E9/L (ref 150–450)
PLATELET # BLD AUTO: 149 10E9/L (ref 150–450)
POTASSIUM SERPL-SCNC: 3.7 MMOL/L (ref 3.4–5.3)
POTASSIUM SERPL-SCNC: 3.7 MMOL/L (ref 3.4–5.3)
PROT SERPL-MCNC: 7.1 G/DL (ref 6.8–8.8)
PROT SERPL-MCNC: 7.6 G/DL (ref 6.8–8.8)
RBC # BLD AUTO: 5.02 10E12/L (ref 4.4–5.9)
RBC # BLD AUTO: 5.38 10E12/L (ref 4.4–5.9)
SODIUM SERPL-SCNC: 133 MMOL/L (ref 133–144)
SODIUM SERPL-SCNC: 136 MMOL/L (ref 133–144)
SPECIMEN EXP DATE BLD: NORMAL
TROPONIN I SERPL-MCNC: <0.015 UG/L (ref 0–0.04)
TROPONIN I SERPL-MCNC: <0.015 UG/L (ref 0–0.04)
WBC # BLD AUTO: 3.5 10E9/L (ref 4–11)
WBC # BLD AUTO: 4.9 10E9/L (ref 4–11)

## 2021-03-26 PROCEDURE — 93005 ELECTROCARDIOGRAM TRACING: CPT | Performed by: FAMILY MEDICINE

## 2021-03-26 PROCEDURE — 250N000013 HC RX MED GY IP 250 OP 250 PS 637: Performed by: INTERNAL MEDICINE

## 2021-03-26 PROCEDURE — 999N001017 HC STATISTIC GLUCOSE BY METER IP

## 2021-03-26 PROCEDURE — 80053 COMPREHEN METABOLIC PANEL: CPT | Performed by: FAMILY MEDICINE

## 2021-03-26 PROCEDURE — XW033E5 INTRODUCTION OF REMDESIVIR ANTI-INFECTIVE INTO PERIPHERAL VEIN, PERCUTANEOUS APPROACH, NEW TECHNOLOGY GROUP 5: ICD-10-PCS | Performed by: INTERNAL MEDICINE

## 2021-03-26 PROCEDURE — 84484 ASSAY OF TROPONIN QUANT: CPT | Performed by: INTERNAL MEDICINE

## 2021-03-26 PROCEDURE — 250N000011 HC RX IP 250 OP 636: Performed by: FAMILY MEDICINE

## 2021-03-26 PROCEDURE — 70496 CT ANGIOGRAPHY HEAD: CPT

## 2021-03-26 PROCEDURE — 83615 LACTATE (LD) (LDH) ENZYME: CPT | Performed by: INTERNAL MEDICINE

## 2021-03-26 PROCEDURE — 120N000001 HC R&B MED SURG/OB

## 2021-03-26 PROCEDURE — 85025 COMPLETE CBC W/AUTO DIFF WBC: CPT | Performed by: INTERNAL MEDICINE

## 2021-03-26 PROCEDURE — 250N000009 HC RX 250: Performed by: FAMILY MEDICINE

## 2021-03-26 PROCEDURE — 85025 COMPLETE CBC W/AUTO DIFF WBC: CPT | Performed by: FAMILY MEDICINE

## 2021-03-26 PROCEDURE — 85730 THROMBOPLASTIN TIME PARTIAL: CPT | Performed by: INTERNAL MEDICINE

## 2021-03-26 PROCEDURE — 83520 IMMUNOASSAY QUANT NOS NONAB: CPT | Performed by: INTERNAL MEDICINE

## 2021-03-26 PROCEDURE — 86901 BLOOD TYPING SEROLOGIC RH(D): CPT | Performed by: INTERNAL MEDICINE

## 2021-03-26 PROCEDURE — 255N000002 HC RX 255 OP 636: Performed by: FAMILY MEDICINE

## 2021-03-26 PROCEDURE — 84145 PROCALCITONIN (PCT): CPT | Performed by: INTERNAL MEDICINE

## 2021-03-26 PROCEDURE — 86900 BLOOD TYPING SEROLOGIC ABO: CPT | Performed by: INTERNAL MEDICINE

## 2021-03-26 PROCEDURE — 80053 COMPREHEN METABOLIC PANEL: CPT | Performed by: INTERNAL MEDICINE

## 2021-03-26 PROCEDURE — 99285 EMERGENCY DEPT VISIT HI MDM: CPT | Mod: 25 | Performed by: FAMILY MEDICINE

## 2021-03-26 PROCEDURE — 250N000011 HC RX IP 250 OP 636: Performed by: INTERNAL MEDICINE

## 2021-03-26 PROCEDURE — 258N000003 HC RX IP 258 OP 636: Performed by: INTERNAL MEDICINE

## 2021-03-26 PROCEDURE — 250N000009 HC RX 250: Performed by: INTERNAL MEDICINE

## 2021-03-26 PROCEDURE — 85610 PROTHROMBIN TIME: CPT | Performed by: INTERNAL MEDICINE

## 2021-03-26 PROCEDURE — 36415 COLL VENOUS BLD VENIPUNCTURE: CPT | Performed by: INTERNAL MEDICINE

## 2021-03-26 PROCEDURE — 84484 ASSAY OF TROPONIN QUANT: CPT | Performed by: FAMILY MEDICINE

## 2021-03-26 PROCEDURE — 85379 FIBRIN DEGRADATION QUANT: CPT | Performed by: INTERNAL MEDICINE

## 2021-03-26 PROCEDURE — 83605 ASSAY OF LACTIC ACID: CPT | Performed by: INTERNAL MEDICINE

## 2021-03-26 PROCEDURE — 85730 THROMBOPLASTIN TIME PARTIAL: CPT | Performed by: FAMILY MEDICINE

## 2021-03-26 PROCEDURE — 99223 1ST HOSP IP/OBS HIGH 75: CPT | Mod: AI | Performed by: INTERNAL MEDICINE

## 2021-03-26 PROCEDURE — 71250 CT THORAX DX C-: CPT

## 2021-03-26 PROCEDURE — 85610 PROTHROMBIN TIME: CPT | Performed by: FAMILY MEDICINE

## 2021-03-26 PROCEDURE — 70450 CT HEAD/BRAIN W/O DYE: CPT

## 2021-03-26 PROCEDURE — 99285 EMERGENCY DEPT VISIT HI MDM: CPT | Performed by: FAMILY MEDICINE

## 2021-03-26 PROCEDURE — A9585 GADOBUTROL INJECTION: HCPCS | Performed by: FAMILY MEDICINE

## 2021-03-26 PROCEDURE — 86140 C-REACTIVE PROTEIN: CPT | Performed by: INTERNAL MEDICINE

## 2021-03-26 PROCEDURE — 70553 MRI BRAIN STEM W/O & W/DYE: CPT

## 2021-03-26 PROCEDURE — G0426 INPT/ED TELECONSULT50: HCPCS | Mod: GT | Performed by: PSYCHIATRY & NEUROLOGY

## 2021-03-26 PROCEDURE — 85384 FIBRINOGEN ACTIVITY: CPT | Performed by: INTERNAL MEDICINE

## 2021-03-26 RX ORDER — LIDOCAINE 40 MG/G
CREAM TOPICAL
Status: DISCONTINUED | OUTPATIENT
Start: 2021-03-26 | End: 2021-03-29 | Stop reason: HOSPADM

## 2021-03-26 RX ORDER — ONDANSETRON 2 MG/ML
4 INJECTION INTRAMUSCULAR; INTRAVENOUS EVERY 6 HOURS PRN
Status: DISCONTINUED | OUTPATIENT
Start: 2021-03-26 | End: 2021-03-29 | Stop reason: HOSPADM

## 2021-03-26 RX ORDER — BISACODYL 10 MG
10 SUPPOSITORY, RECTAL RECTAL DAILY PRN
Status: DISCONTINUED | OUTPATIENT
Start: 2021-03-26 | End: 2021-03-29 | Stop reason: HOSPADM

## 2021-03-26 RX ORDER — AMOXICILLIN 250 MG
1 CAPSULE ORAL 2 TIMES DAILY PRN
Status: DISCONTINUED | OUTPATIENT
Start: 2021-03-26 | End: 2021-03-29 | Stop reason: HOSPADM

## 2021-03-26 RX ORDER — AMOXICILLIN 250 MG
2 CAPSULE ORAL 2 TIMES DAILY PRN
Status: DISCONTINUED | OUTPATIENT
Start: 2021-03-26 | End: 2021-03-29 | Stop reason: HOSPADM

## 2021-03-26 RX ORDER — GADOBUTROL 604.72 MG/ML
7 INJECTION INTRAVENOUS ONCE
Status: COMPLETED | OUTPATIENT
Start: 2021-03-26 | End: 2021-03-26

## 2021-03-26 RX ORDER — DEXAMETHASONE SODIUM PHOSPHATE 4 MG/ML
6 INJECTION, SOLUTION INTRA-ARTICULAR; INTRALESIONAL; INTRAMUSCULAR; INTRAVENOUS; SOFT TISSUE EVERY 24 HOURS
Status: DISCONTINUED | OUTPATIENT
Start: 2021-03-26 | End: 2021-03-29 | Stop reason: HOSPADM

## 2021-03-26 RX ORDER — ONDANSETRON 4 MG/1
4 TABLET, ORALLY DISINTEGRATING ORAL EVERY 6 HOURS PRN
Status: DISCONTINUED | OUTPATIENT
Start: 2021-03-26 | End: 2021-03-29 | Stop reason: HOSPADM

## 2021-03-26 RX ORDER — SIMVASTATIN 40 MG
40 TABLET ORAL AT BEDTIME
Status: DISCONTINUED | OUTPATIENT
Start: 2021-03-26 | End: 2021-03-29 | Stop reason: HOSPADM

## 2021-03-26 RX ORDER — IOPAMIDOL 755 MG/ML
70 INJECTION, SOLUTION INTRAVASCULAR ONCE
Status: COMPLETED | OUTPATIENT
Start: 2021-03-26 | End: 2021-03-26

## 2021-03-26 RX ORDER — LOSARTAN POTASSIUM 25 MG/1
25 TABLET ORAL DAILY
Status: DISCONTINUED | OUTPATIENT
Start: 2021-03-27 | End: 2021-03-29 | Stop reason: HOSPADM

## 2021-03-26 RX ORDER — ACETAMINOPHEN 325 MG/1
650 TABLET ORAL EVERY 4 HOURS PRN
Status: DISCONTINUED | OUTPATIENT
Start: 2021-03-26 | End: 2021-03-29 | Stop reason: HOSPADM

## 2021-03-26 RX ADMIN — SIMVASTATIN 40 MG: 40 TABLET, FILM COATED ORAL at 23:53

## 2021-03-26 RX ADMIN — SODIUM CHLORIDE 50 ML: 9 INJECTION, SOLUTION INTRAVENOUS at 23:00

## 2021-03-26 RX ADMIN — REMDESIVIR 200 MG: 100 INJECTION, POWDER, LYOPHILIZED, FOR SOLUTION INTRAVENOUS at 22:02

## 2021-03-26 RX ADMIN — GADOBUTROL 7 ML: 604.72 INJECTION INTRAVENOUS at 19:46

## 2021-03-26 RX ADMIN — DEXAMETHASONE SODIUM PHOSPHATE 6 MG: 4 INJECTION, SOLUTION INTRAMUSCULAR; INTRAVENOUS at 22:06

## 2021-03-26 RX ADMIN — IOPAMIDOL 70 ML: 755 INJECTION, SOLUTION INTRAVENOUS at 13:08

## 2021-03-26 RX ADMIN — SODIUM CHLORIDE 100 ML: 9 INJECTION, SOLUTION INTRAVENOUS at 13:08

## 2021-03-26 RX ADMIN — ENOXAPARIN SODIUM 40 MG: 40 INJECTION SUBCUTANEOUS at 22:07

## 2021-03-26 RX ADMIN — ACETAMINOPHEN 650 MG: 325 TABLET, FILM COATED ORAL at 22:02

## 2021-03-26 ASSESSMENT — ACTIVITIES OF DAILY LIVING (ADL)
DRESSING/BATHING_DIFFICULTY: NO
TOILETING_ISSUES: NO
WALKING_OR_CLIMBING_STAIRS_DIFFICULTY: NO
FALL_HISTORY_WITHIN_LAST_SIX_MONTHS: NO
CONCENTRATING,_REMEMBERING_OR_MAKING_DECISIONS_DIFFICULTY: NO
WEAR_GLASSES_OR_BLIND: NO
DIFFICULTY_EATING/SWALLOWING: NO
HEARING_DIFFICULTY_OR_DEAF: NO
DIFFICULTY_COMMUNICATING: NO
DOING_ERRANDS_INDEPENDENTLY_DIFFICULTY: NO
PATIENT_/_FAMILY_COMMUNICATION_STYLE: SPOKEN LANGUAGE (ENGLISH OR BILINGUAL)

## 2021-03-26 ASSESSMENT — VISUAL ACUITY
OD: 20/100
OS: 20/200

## 2021-03-26 NOTE — CONSULTS
"OPHTHALMOLOGY CONSULT NOTE    Date:  3/26/2021    Patient:  Jaime Saucedo  :  1950    Referring Provider:    [facility]    Reason for Consult:  Asked by [facility] ER to evaluate patient with vision changes, Right eye.  See Neuro consult.  Patient is s/p vitrectomy right eye about 3 months ago due to macular hole repair.  Patient has been noting decreasing vision over the past 6 weeks, which seems to be accelerating.  Today his vision seems even worse after the vertigo episode to the point of seeing mostly complete blur from the right eye.  He says everything is not focused when using the right eye.    Patient underwent vitrectomy on the left eye in 2018 for same problem.  Subsequent to that he had cataract surgery in the left eye.  He reports vision is stable left eye.  Currently he denies diplopia, it is only \"out of focus\" when he covers the left eye.    Exam:  Vision 20/400 right eye, 20/40 left eye.  Pupils equal and reactive / no APD.  Motility full.  Anterior segment shows dense nuclear / cortical cataract right eye, and quiet pseudophakia left eye.  Fundus exam deferred.    Impression:    1. Monocular decreased vision / (?diplopia) right eye secondary to dense cataract.  2. No signs of cranial neuropathy 2-7.    Plan:    1. Outpatient management for cataract right eye when stable.      Thank you for allowing me to participate in the care of your patient.        Johan Anthony MD  [facility]  [phone]    "

## 2021-03-26 NOTE — H&P
"[facility] History and Physical    Jaime Saucedo MRN# [id number]   Age: 70 year old YOB: 1950     Date of Admission:  3/26/2021    Home clinic:   [facility]  Primary care provider: Debra, [facility]          Chief Complaint:   Dysequilibrium, poor vision, covid-19 infection    History is obtained from the patient, electronic health record and emergency department physician          History of Present Illness:   This patient is a 70 year old  male with a significant past medical history of hypertension who presents with dysequilibrium, poor vision-progressively getting worse. Pt was diagnosed with Covid about 6 days ago, symptomatic x10 days including cough, generally dry, mild shortness of breath , slight loss of taste but not smell, body aches, fever for the first 3 days. Severe cough-still ongoing.  Today when he was walking downstairs at home 2 hours prior to presentation developed a sensation of disequilibrium/dizziness that is described as off-balance ship rocking type motion nearly lost his balance and fell down. R eye has cataract for which he is going to have surgery soon -but felt was getting harder to focus-? Diplopia. He says due to his vision he is having difficulty with balance.   No headache/ no motar/ sensory loss. Pt was concerned for \"brain needing more oxygen due to covid\" and wanted to be checked out.            Past Medical History:     Patient Active Problem List    Diagnosis Date Noted     Hypertension goal BP (blood pressure) < 140/90 04/18/2017     Priority: Medium     CARDIOVASCULAR SCREENING; LDL GOAL LESS THAN 160 04/18/2017     Priority: Medium               Past Surgical History:      Past Surgical History:   Procedure Laterality Date     ARTHROSCOPY KNEE RT/LT Bilateral 2006    partial meniscectomy     PHACOEMULSIFICATION WITH STANDARD INTRAOCULAR LENS IMPLANT Left 3/6/2019    Procedure: Cataract Removal with Implant;  " Surgeon: Johan Anthony MD;  Location: [facility] OR             Social History:     Social History     Socioeconomic History     Marital status:      Spouse name: Not on file     Number of children: Not on file     Years of education: Not on file     Highest education level: Not on file   Occupational History     Not on file   Social Needs     Financial resource strain: Not on file     Food insecurity     Worry: Not on file     Inability: Not on file     Transportation needs     Medical: Not on file     Non-medical: Not on file   Tobacco Use     Smoking status: Never Smoker     Smokeless tobacco: Never Used   Substance and Sexual Activity     Alcohol use: Yes     Comment: rare     Drug use: No     Sexual activity: Yes   Lifestyle     Physical activity     Days per week: Not on file     Minutes per session: Not on file     Stress: Not on file   Relationships     Social connections     Talks on phone: Not on file     Gets together: Not on file     Attends [employer] service: Not on file     Active member of club or organization: Not on file     Attends meetings of clubs or organizations: Not on file     Relationship status: Not on file     Intimate partner violence     Fear of current or ex partner: Not on file     Emotionally abused: Not on file     Physically abused: Not on file     Forced sexual activity: Not on file   Other Topics Concern     Parent/sibling w/ CABG, MI or angioplasty before 65F 55M? Not Asked   Social History Narrative     Not on file             Family History:     Family History   Problem Relation Age of Onset     Hypertension Mother      Hypertension Father              Allergies:   No Known Allergies          Medications:     Prior to Admission medications    Medication Sig Last Dose Taking? Auth Provider   losartan (COZAAR) 25 MG tablet Take 1 tablet (25 mg) by mouth daily   Rafaela Carey NP   pseudoePHEDrine (SUDAFED) 30 MG tablet Take 1 tablet (30 mg) by mouth daily as  needed for congestion   Lili Freitas APRN CNP   simvastatin (ZOCOR) 40 MG tablet Take 1 tablet (40 mg) by mouth At Bedtime   Adalberto Gautam MD            Review of Systems:   The Review of Systems is negative in ALL other than noted in the HPI          Physical Exam:   Blood pressure 108/85, pulse 78, temperature 99  F (37.2  C), temperature source Oral, resp. rate 24, weight 77.3 kg (170 lb 6.4 oz), SpO2 93 %.  GENERAL APPEARANCE: healthy, alert and no distress  EYES: conjunctiva clear, eyes grossly normal  HENT: external ears and nose normal   NECK: supple, no masses or adenopathy  RESP: lungs -B fine crackles up to 2/3rds up lungs- no rales, rhonchi or wheezes  CV: regular rate and rhythm, normal S1 S2, no S3 or S4 and no murmur, click or rub   ABDOMEN: soft, nontender, no HSM or masses and bowel sounds normal  MS: no clubbing, cyanosis; no edema  SKIN: clear without significant rashes or lesions  NEURO: Normal strength and tone, sensory exam grossly normal, mentation intact and speech normal         Data:     Lab Results   Component Value Date    WBC 3.5 (L) 03/26/2021    HGB 15.2 03/26/2021    HCT 42.5 03/26/2021    MCV 85 03/26/2021     (L) 03/26/2021     Lab Results   Component Value Date     03/26/2021    CO2 24 03/26/2021     Lab Results   Component Value Date    BUN 21 03/26/2021     No components found for: SEDRATE  No components found for: DDIMER  No results found for: BNP  No results found for: TSH  No results found for: TROPONIN  UA RESULTS:  No results for input(s): COLOR, APPEARANCE, URINEGLC, URINEBILI, URINEKETONE, SG, UBLD, URINEPH, PROTEIN, UROBILINOGEN, NITRITE, LEUKEST, RBCU, WBCU in the last 54953 hours.  Liver Function Studies -   Recent Labs   Lab Test 03/26/21  1259   PROTTOTAL 7.1   ALBUMIN 3.2*   BILITOTAL 0.7   ALKPHOS 84   AST 45   ALT 33       EKG results:  SR-T inversions lat leads    RADIOLOGY:    CT CHEST- IMPRESSION:   1.  Groundglass opacities  through both lungs consistent with COVID-19  Pneumonia.    CTA HEAD/ NECK-IMPRESSION:  1. Negative CT angiography of the head and neck.  2. Bilateral groundglass infiltrates in the upper lung zones. This can  be seen in COVID-19 patients, although other pneumonias can also give  this process.    A/P  Pneumonia due to covis 19 infection  Dx with covid positive test 6 days ago. Sx ongoing x 10 days and still coughing. o2 sats 92-93%on RA. CT chest as above. Meets criteria for both decadron( o2 sats <94% on RA) and remdisivir (andrea infil).   Will ck covid labs, start remdisivir/ decadron , ck d dimer and start anticoagulation. Discussed with ID-agree with plan.     R eye cataract  Pt to have surgery soon.  Dr Anthony seen pt in ED-thinks diploplia 2ndry to cataract. Knows pt well. Please see his consult note in epic when available.     Dizziness/ dysequilibrium/ diploplia  Likely due to R eye cataract causing difficulty in focussing with both eyes and causing dysequilibrium.   MRI-P. Doubt CVA.    Leucopenia  Will ck PCT. Follow labs. If PCT elevated, would add emp antbx for possible bacterial superinfection.    HTN  Continue meds    HLD  Continue meds    COVID-19 infection  Needs special precautions.     DVT PROF-melvin Abebe MD  [phone]

## 2021-03-26 NOTE — CONSULTS
"[facility]    Stroke Consult Note    Reason for Consult: Stroke Code    Chief Complaint: Eye Problem, Dizziness, and Covid Concern    HPI  Jaime Saucedo is a 70 year old male with PMH of HTN, and recent surgery in his right eye, who presents to the emergency department for evaluation of new onset diplopia and dizziness that suddenly started this morning around 11 AM. Patient recently tested positive for COVID-19, and reports he has been symptomatic for 10 days. Patient does not recall participating in any particular activities prior to symptom onset. In the emergency department patient reports continued diplopia with worse vision in his left eye. On exam he does not endorse current HA, dizziness, focal weakness, or sensory changes beyond baseline. He reports feeling disorientated secondary to his current vision status. Patient also reports some left sided weakness at baseline, specifically reporting decreased strength in left quad at baseline. Initial CT/CTA negative for LVO.      TPA Treatment   Not given due to minor/isolated/quickly resolving symptoms.    Endovascular Treatment  Not initiated due to absence of proximal vessel occlusion    Impression  70 yr old male who recently testing positive for COVID-19 (symptomatic) and recent right eye surgery, who presents to the emergency department for evaluation of dizziness and diplopia (R>L), Initial CT/CTA unrevealing. MRI brain pending     Recommendations  -MRI brain w/wo, if + admit for full stroke workup   -If MRI brain unrevealing, follow up with outpatient Optho for further evaluation     Patient Follow-up    - final recommendation pending work-up    Thank you for this consult.    Vonnie Lentz PA-C   Neurology  To page me or covering stroke neurology team member, click here: AMCOM   Choose \"On Call\" tab at top, then search dropdown box for \"Neurology Adult\", select location, press Enter, then look for stroke/neuro " ICU/telestroke.  ______________________________________________________    Past Medical History   No past medical history on file.  Past Surgical History   Past Surgical History:   Procedure Laterality Date     ARTHROSCOPY KNEE RT/LT Bilateral 2006    partial meniscectomy     PHACOEMULSIFICATION WITH STANDARD INTRAOCULAR LENS IMPLANT Left 3/6/2019    Procedure: Cataract Removal with Implant;  Surgeon: Johan Anthony MD;  Location: [facility] OR     Medications   Home Meds  Prior to Admission medications    Medication Sig Start Date End Date Taking? Authorizing Provider   losartan (COZAAR) 25 MG tablet Take 1 tablet (25 mg) by mouth daily 11/4/20   Rafaela Carey NP   pseudoePHEDrine (SUDAFED) 30 MG tablet Take 1 tablet (30 mg) by mouth daily as needed for congestion 11/9/20   Lili Freitas APRN CNP   simvastatin (ZOCOR) 40 MG tablet Take 1 tablet (40 mg) by mouth At Bedtime 1/8/21   Adalberto Gautam MD       Scheduled Meds      Infusion Meds      PRN Meds      Allergies   No Known Allergies  Family History   Family History   Problem Relation Age of Onset     Hypertension Mother      Hypertension Father      Social History   Social History     Tobacco Use     Smoking status: Never Smoker     Smokeless tobacco: Never Used   Substance Use Topics     Alcohol use: Yes     Comment: rare     Drug use: No       Review of Systems   The 10 point Review of Systems is negative other than noted in the HPI or here.      PHYSICAL EXAMINATION  Temp:  [99  F (37.2  C)] 99  F (37.2  C)  Pulse:  [80-94] 81  Resp:  [12-20] 20  BP: (111-120)/(73-79) 111/76  SpO2:  [94 %-96 %] 94 %     Neurologic  Mental Status:  alert, oriented x 3, follows commands, speech clear and fluent  Cranial Nerves:  EOMI with normal smooth pursuit, subtle left facial droop, facial sensation intact and symmetric (tested by nurse), hearing not formally tested but intact to conversation, no dysarthria  Motor:  no abnormal movements,  able to move all limbs antigravity spontaneously with no signs of hemiparesis observed, LLE slighly weak on exam (pt reports decreased quad strength on left at baseline)  Reflexes:  unable to test (telestroke)  Sensory:  light touch sensation intact and symmetric throughout upper and lower extremities (assessed by nurse)  Coordination:  subtle ataxia noted in left upper and lower with fine motor movements (finger and foot tap)   Station/Gait:  unable to test due to telestroke      Dysphagia Screen  Per Nursing    Stroke Scales    NIHSS  Interval     Interval Comments     1a. Level of Consciousness 0-->Alert, keenly responsive   1b. LOC Questions 0-->Answers both questions correctly   1c. LOC Commands 0-->Performs both tasks correctly   2.   Best Gaze 0-->Normal   3.   Visual 0-->No visual loss   4.   Facial Palsy 1-->Minor paralysis (flattened nasolabial fold, asymmetry on smiling)   5a. Motor Arm, Left 0-->No drift, limb holds 90 (or 45) degrees for full 10 secs   5b. Motor Arm, Right 0-->No drift, limb holds 90 (or 45) degrees for full 10 secs   6a. Motor Leg, Left 1-->Drift, leg falls by the end of the 5-sec period but does not hit bed   6b. Motor Leg, right 0-->No drift, leg holds 30 degree position for full 5 secs   7.   Limb Ataxia 2-->Present in two limbs   8.   Sensory 0-->Normal, no sensory loss   9.   Best Language 0-->No aphasia, normal   10. Dysarthria 0-->Normal   11. Extinction and Inattention  0-->No abnormality   Total 4 (03/26/21 1407)       Imaging  I personally reviewed all imaging; relevant findings per HPI.     Lab Results Data   CBC  No results for input(s): WBC, RBC, HGB, HCT, PLT in the last 168 hours.  Basic Metabolic Panel    No results for input(s): NA, POTASSIUM, CHLORIDE, CO2, BUN, CR, GLC, RAUL in the last 168 hours.  Liver Panel  No results for input(s): PROTTOTAL, ALBUMIN, BILITOTAL, ALKPHOS, AST, ALT, BILIDIRECT in the last 168 hours.  INR  No lab results found.   Lipid Profile     Recent Labs   Lab Test 11/18/20 07/11/19  1013 06/21/18  1442   CHOL 255* 206* 208*   HDL 43 49 42   * 121* 123*   TRIG 195* 182* 217*     A1C  No lab results found.  Troponin I  No results for input(s): TROPI in the last 168 hours.       Stroke Code / Stroke Consult Data Data   Stroke Code Data  (for stroke code with tele)  Stroke code activated 03/26/21   1303   First stroke provider response 03/26/21   1305   Video start time 03/26/21   1335   Video end time 03/26/21   1351   Last known normal 03/26/21   1000   Time of discovery  (or onset of symptoms)  03/26/21   1100   Head CT read by Stroke Neuro Dr/Provider 03/26/21   1315   Was stroke code de-escalated?   03/26/21 1355     Telestroke Service Details  Type of service telemedicine diagnostic assessment of acute neurological changes   Reason telemedicine is appropriate patient requires assessment with a specialist for diagnosis and treatment of neurological symptoms   Mode of transmission secure interactive audio and video communication per [employer]   Originating site (patient location) [facility]    Distant site (provider location) [facility]

## 2021-03-26 NOTE — ED PROVIDER NOTES
History     Chief Complaint   Patient presents with     Eye Problem     Dizziness     Covid Concern   Dizzy, Covid positive  HPI  Jaime Saucedo is a 70 year old male, past medical history is significant for hypertension, presents to the emergency department with concerns of dizziness in the context of recent COVID-19 positive test result on 3/24/2021.  I was asked to see this patient as a stroke evaluation.  History is obtained from the patient who states that he was diagnosed with Covid about 6 days ago, symptomatic x10 days including cough, generally dry, mild shortness of breath nothing currently, slight loss of taste but not smell, body aches, fever for the first 3 days none recently, and now today when he was walking downstairs at home 2 hours prior to presentation developed a sensation of disequilibrium/dizziness that is described as off-balance ship rocking type motion nearly lost his balance and fell down in conjunction with the development of acute right eye diplopia (he has cataracts he states in the right eye that he is going to have surgery for but has never had symptoms like this before).  He notes no headache, orbital pain either at rest or with extraocular range of motion, no ocular trauma or head injury recently.  As noted he has never had diplopia in the right eye before.  Has no vertigo symptoms while lying down, none with head movement from side to side but states if he sits up or if he tries to walk he feels a sensation of disequilibrium and does not want to do it.      Allergies:  No Known Allergies    Problem List:    Patient Active Problem List    Diagnosis Date Noted     Hypertension goal BP (blood pressure) < 140/90 04/18/2017     Priority: Medium     CARDIOVASCULAR SCREENING; LDL GOAL LESS THAN 160 04/18/2017     Priority: Medium        Past Medical History:    No past medical history on file.    Past Surgical History:    Past Surgical History:   Procedure Laterality Date      ARTHROSCOPY KNEE RT/LT Bilateral 2006    partial meniscectomy     PHACOEMULSIFICATION WITH STANDARD INTRAOCULAR LENS IMPLANT Left 3/6/2019    Procedure: Cataract Removal with Implant;  Surgeon: Johan Anthony MD;  Location: [facility] OR       Family History:    Family History   Problem Relation Age of Onset     Hypertension Mother      Hypertension Father        Social History:  Marital Status:   [2]  Social History     Tobacco Use     Smoking status: Never Smoker     Smokeless tobacco: Never Used   Substance Use Topics     Alcohol use: Yes     Comment: rare     Drug use: No        Medications:    losartan (COZAAR) 25 MG tablet  pseudoePHEDrine (SUDAFED) 30 MG tablet  simvastatin (ZOCOR) 40 MG tablet          Review of Systems   All other systems reviewed and are negative.      Physical Exam   BP: 120/79  Pulse: 87  Temp: 99  F (37.2  C)  Resp: 12  Weight: 77.3 kg (170 lb 6.4 oz)  SpO2: 95 %      Physical Exam  Vitals signs and nursing note reviewed.   Constitutional:       General: He is not in acute distress.     Appearance: Normal appearance. He is normal weight. He is not ill-appearing.   HENT:      Head: Normocephalic and atraumatic.      Right Ear: Tympanic membrane normal.      Left Ear: Tympanic membrane normal.      Nose: Nose normal.      Mouth/Throat:      Mouth: Mucous membranes are dry.      Pharynx: Oropharynx is clear.   Eyes:      Extraocular Movements: Extraocular movements intact.      Conjunctiva/sclera: Conjunctivae normal.      Pupils: Pupils are equal, round, and reactive to light.      Comments: The patient identifies diplopia in the right eye with both eyes open, it persists but is less noticeable with closure of the left eye.  He notices the presence of the cataract more if he closes his left eye.     Neck:      Musculoskeletal: Normal range of motion and neck supple.   Cardiovascular:      Rate and Rhythm: Normal rate and regular rhythm.      Pulses: Normal pulses.      Heart  sounds: Normal heart sounds.   Pulmonary:      Effort: Pulmonary effort is normal.      Breath sounds: Normal breath sounds.   Abdominal:      General: Bowel sounds are normal.      Palpations: Abdomen is soft.   Musculoskeletal: Normal range of motion.   Skin:     General: Skin is warm and dry.      Capillary Refill: Capillary refill takes less than 2 seconds.   Neurological:      General: No focal deficit present.      Mental Status: He is alert and oriented to person, place, and time.   Psychiatric:         Mood and Affect: Mood normal.         Behavior: Behavior normal.         ED Course   1:11 PM  Discussed with stroke neurologyJam, will follow but agree with initial work-up with CT/CTA.  Recommended MRI after negative CT/CTA.  Plan to admit the patient.         Procedures               Critical Care time:  none               Results for orders placed or performed during the hospital encounter of 03/26/21 (from the past 24 hour(s))   CBC with platelets differential   Result Value Ref Range    WBC 3.5 (L) 4.0 - 11.0 10e9/L    RBC Count 5.02 4.4 - 5.9 10e12/L    Hemoglobin 15.2 13.3 - 17.7 g/dL    Hematocrit 42.5 40.0 - 53.0 %    MCV 85 78 - 100 fl    MCH 30.3 26.5 - 33.0 pg    MCHC 35.8 31.5 - 36.5 g/dL    RDW 11.6 10.0 - 15.0 %    Platelet Count 142 (L) 150 - 450 10e9/L    Diff Method Automated Method     % Neutrophils 74.9 %    % Lymphocytes 15.1 %    % Monocytes 9.7 %    % Eosinophils 0.0 %    % Basophils 0.0 %    % Immature Granulocytes 0.3 %    Nucleated RBCs 0 0 /100    Absolute Neutrophil 2.6 1.6 - 8.3 10e9/L    Absolute Lymphocytes 0.5 (L) 0.8 - 5.3 10e9/L    Absolute Monocytes 0.3 0.0 - 1.3 10e9/L    Absolute Eosinophils 0.0 0.0 - 0.7 10e9/L    Absolute Basophils 0.0 0.0 - 0.2 10e9/L    Abs Immature Granulocytes 0.0 0 - 0.4 10e9/L    Absolute Nucleated RBC 0.0    Comprehensive metabolic panel   Result Value Ref Range    Sodium 133 133 - 144 mmol/L    Potassium 3.7 3.4 - 5.3 mmol/L    Chloride 101  94 - 109 mmol/L    Carbon Dioxide 24 20 - 32 mmol/L    Anion Gap 8 3 - 14 mmol/L    Glucose 101 (H) 70 - 99 mg/dL    Urea Nitrogen 21 7 - 30 mg/dL    Creatinine 1.11 0.66 - 1.25 mg/dL    GFR Estimate 67 >60 mL/min/[1.73_m2]    GFR Estimate If Black 78 >60 mL/min/[1.73_m2]    Calcium 8.7 8.5 - 10.1 mg/dL    Bilirubin Total 0.7 0.2 - 1.3 mg/dL    Albumin 3.2 (L) 3.4 - 5.0 g/dL    Protein Total 7.1 6.8 - 8.8 g/dL    Alkaline Phosphatase 84 40 - 150 U/L    ALT 33 0 - 70 U/L    AST 45 0 - 45 U/L   INR   Result Value Ref Range    INR 1.05 0.86 - 1.14   Partial thromboplastin time   Result Value Ref Range    PTT 32 22 - 37 sec   Troponin I   Result Value Ref Range    Troponin I ES <0.015 0.000 - 0.045 ug/L   CT Head w/o Contrast    Narrative    CT SCAN OF THE HEAD WITHOUT CONTRAST   3/26/2021 1:13 PM     HISTORY: Code Stroke to evaluate for potential thrombolysis and  thrombectomy. Dizziness. COVID-19 positive patient.    TECHNIQUE:  Axial images of the head and coronal reformations without  IV contrast material.  Radiation dose for this scan was reduced using  automated exposure control, adjustment of the mA and/or kV according  to patient size, or iterative reconstruction technique.    COMPARISON: None.    FINDINGS: There is some mild cerebral atrophy. Patchy white matter  changes are seen in both hemispheres without mass effect. There is no  evidence for intracranial hemorrhage, mass effect, acute infarct, or  skull fracture.      Impression    IMPRESSION:  1. Mild cerebral atrophy.  2. Moderately extensive white matter changes without mass effect.  These are nonspecific but probably due to chronic small vessel  ischemic disease.  3. No evidence for intracranial hemorrhage or any acute intracranial  process.    SHEY WEBER MD   CTA Head Neck with Contrast    Narrative    CTA HEAD AND NECK WITH CONTRAST 3/26/2021 1:18 PM     HISTORY: Code Stroke to evaluate for potential thrombolysis and  thrombectomy. Acute onset of  dizziness.    TECHNIQUE: Axial images were obtained through the head and neck with  intravenous contrast. 70 mL of Isovue-370 was given. Multiplanar  reconstructions were performed. 3-D reconstructions off a remote  workstation for CT angiography were also acquired. Carotid stenoses  were evaluated by comparing the caliber of the proximal internal  carotid artery to the caliber of the distal internal carotid artery.  Radiation dose for this scan was reduced using automated exposure  control, adjustment of the mA and/or kV according to patient size, or  iterative reconstruction technique.    FINDINGS:    Other findings: There are patchy groundglass infiltrates in the upper  lobes bilaterally consistent with COVID-19 diagnosis.    Brachiocephalic vessels: Normal.    Right carotid system: Normal.    Left carotid system: Normal.    Right vertebral artery: Normal.    Left vertebral artery: Normal.    [place]: Normal.      Impression    IMPRESSION:  1. Negative CT angiography of the head and neck.  2. Bilateral groundglass infiltrates in the upper lung zones. This can  be seen in COVID-19 patients, although other pneumonias can also give  this process.    SHEY WEBER MD   Glucose by meter   Result Value Ref Range    Glucose 106 (H) 70 - 99 mg/dL   Chest CT w/o contrast    Narrative    CT CHEST WITHOUT CONTRAST 3/26/2021 2:59 PM    CLINICAL HISTORY: Shortness of air, recent COVID positive, bilateral  auscultation abnormality suspect COVID pneumonia.    TECHNIQUE: CT chest without IV contrast. Multiplanar reformats were  obtained. Dose reduction techniques were used.    CONTRAST: None.    COMPARISON: None.    FINDINGS:   LUNGS AND PLEURA: Patchy moderate groundglass opacities are seen  throughout both lungs consistent with COVID-19 pneumonia. No  significant consolidation or crazy paving at this point. Central  airways are patent. No effusions.    MEDIASTINUM/AXILLAE: No lymphadenopathy. No thoracic aortic  aneurysm.    UPPER ABDOMEN: Multiple cysts through both kidneys. No follow-up  necessary.    MUSCULOSKELETAL: Unremarkable.      Impression    IMPRESSION:   1.  Groundglass opacities through both lungs consistent with COVID-19  pneumonia.    JORGE L ROJO MD   5:55 PM  Discussed with the on-call hospitalist for admission with pending MRI.  Dr. Hinton.  Agrees to accept the care of the patient upon admission she will place orders for dexamethasone and remdesivir.  6:54 PM  Discussed this patient with neurology Dr. Chi Anthony who saw the patient in the emergency department in PPE. He is familiar with the patient, please see his consult, the impression is that the patient's acute diplopia is most likely related to his cataract in the right eye.  7:22 PM  I was asked by the patient's nurse to go in the room and talk with him as he had indicated that he does not want the MRI waiting for and that he wants to go.  I saw the patient promptly in full PPE and discuss his concerns with him.  He feels quite strongly that he does not have a stroke and does not want the MRI done.  He does agree with the admission and wants to know why he has not received his dexamethasone and remdesivir as we discussed earlier yet.  I strongly encourage the patient to have the MRI as ordered but he refused.  I have paged the hospitalist to notify her that the patient has refused it.  The patient already has admission orders in.  The patient subsequently changed his mind again and decided to have the MRI done.  The hospitalist is also aware of this.      Medications   iopamidol (ISOVUE-370) solution 70 mL (70 mLs Intravenous Given 3/26/21 1308)   sodium chloride 0.9 % bag 500mL for CT scan flush use (100 mLs Intravenous Given 3/26/21 1308)       Assessments & Plan (with Medical Decision Making)     I have reviewed the nursing notes.    I have reviewed the findings, diagnosis, plan and need for follow up with the patient.       New  Prescriptions    No medications on file       Final diagnoses:   Pneumonia due to 2019 novel coronavirus   Vertigo   Diplopia - Monocular; felt likely related to cataract per ophthalmology please see the consult       3/26/2021   [facility] EMERGENCY DEPT     Chung Wiley MD  03/28/21 0887

## 2021-03-27 LAB
ANION GAP SERPL CALCULATED.3IONS-SCNC: 9 MMOL/L (ref 3–14)
BUN SERPL-MCNC: 21 MG/DL (ref 7–30)
CALCIUM SERPL-MCNC: 8.4 MG/DL (ref 8.5–10.1)
CHLORIDE SERPL-SCNC: 104 MMOL/L (ref 94–109)
CO2 SERPL-SCNC: 24 MMOL/L (ref 20–32)
CREAT SERPL-MCNC: 0.99 MG/DL (ref 0.66–1.25)
CRP SERPL-MCNC: 81.9 MG/L (ref 0–8)
D DIMER PPP FEU-MCNC: 0.7 UG/ML FEU (ref 0–0.5)
ERYTHROCYTE [DISTWIDTH] IN BLOOD BY AUTOMATED COUNT: 11.5 % (ref 10–15)
FIBRINOGEN PPP-MCNC: 603 MG/DL (ref 200–420)
GFR SERPL CREATININE-BSD FRML MDRD: 78 ML/MIN/{1.73_M2}
GLUCOSE SERPL-MCNC: 133 MG/DL (ref 70–99)
HCT VFR BLD AUTO: 41.7 % (ref 40–53)
HGB BLD-MCNC: 14.9 G/DL (ref 13.3–17.7)
IL6 SERPL-MCNC: 86.15 PG/ML
LACTATE BLD-SCNC: 1.7 MMOL/L (ref 0.7–2)
MCH RBC QN AUTO: 30.4 PG (ref 26.5–33)
MCHC RBC AUTO-ENTMCNC: 35.7 G/DL (ref 31.5–36.5)
MCV RBC AUTO: 85 FL (ref 78–100)
PLATELET # BLD AUTO: 138 10E9/L (ref 150–450)
POTASSIUM SERPL-SCNC: 4.2 MMOL/L (ref 3.4–5.3)
PROCALCITONIN SERPL-MCNC: 0.14 NG/ML
RBC # BLD AUTO: 4.9 10E12/L (ref 4.4–5.9)
SODIUM SERPL-SCNC: 137 MMOL/L (ref 133–144)
WBC # BLD AUTO: 3 10E9/L (ref 4–11)

## 2021-03-27 PROCEDURE — 250N000009 HC RX 250: Performed by: INTERNAL MEDICINE

## 2021-03-27 PROCEDURE — 99232 SBSQ HOSP IP/OBS MODERATE 35: CPT | Performed by: INTERNAL MEDICINE

## 2021-03-27 PROCEDURE — 36415 COLL VENOUS BLD VENIPUNCTURE: CPT | Performed by: INTERNAL MEDICINE

## 2021-03-27 PROCEDURE — 120N000001 HC R&B MED SURG/OB

## 2021-03-27 PROCEDURE — 85379 FIBRIN DEGRADATION QUANT: CPT | Performed by: INTERNAL MEDICINE

## 2021-03-27 PROCEDURE — 83605 ASSAY OF LACTIC ACID: CPT | Performed by: INTERNAL MEDICINE

## 2021-03-27 PROCEDURE — 250N000011 HC RX IP 250 OP 636: Performed by: INTERNAL MEDICINE

## 2021-03-27 PROCEDURE — 85027 COMPLETE CBC AUTOMATED: CPT | Performed by: INTERNAL MEDICINE

## 2021-03-27 PROCEDURE — 250N000013 HC RX MED GY IP 250 OP 250 PS 637: Performed by: INTERNAL MEDICINE

## 2021-03-27 PROCEDURE — 258N000003 HC RX IP 258 OP 636: Performed by: INTERNAL MEDICINE

## 2021-03-27 PROCEDURE — 85384 FIBRINOGEN ACTIVITY: CPT | Performed by: INTERNAL MEDICINE

## 2021-03-27 PROCEDURE — 80048 BASIC METABOLIC PNL TOTAL CA: CPT | Performed by: INTERNAL MEDICINE

## 2021-03-27 PROCEDURE — 86140 C-REACTIVE PROTEIN: CPT | Performed by: INTERNAL MEDICINE

## 2021-03-27 RX ADMIN — DEXAMETHASONE SODIUM PHOSPHATE 6 MG: 4 INJECTION, SOLUTION INTRAMUSCULAR; INTRAVENOUS at 21:22

## 2021-03-27 RX ADMIN — REMDESIVIR 100 MG: 100 INJECTION, POWDER, LYOPHILIZED, FOR SOLUTION INTRAVENOUS at 17:26

## 2021-03-27 RX ADMIN — LOSARTAN POTASSIUM 25 MG: 25 TABLET, FILM COATED ORAL at 08:35

## 2021-03-27 RX ADMIN — SIMVASTATIN 40 MG: 40 TABLET, FILM COATED ORAL at 21:22

## 2021-03-27 RX ADMIN — ENOXAPARIN SODIUM 40 MG: 40 INJECTION SUBCUTANEOUS at 21:22

## 2021-03-27 RX ADMIN — SODIUM CHLORIDE 50 ML: 9 INJECTION, SOLUTION INTRAVENOUS at 18:18

## 2021-03-27 ASSESSMENT — ACTIVITIES OF DAILY LIVING (ADL)
ADLS_ACUITY_SCORE: 16

## 2021-03-27 NOTE — PLAN OF CARE
"Vitally stable except pt still requiring 2LNC to maintain SPO2 between 92-94%. Pt stated that \"I don't need oxygen when going to the bathroom.\" NA spot checked patient for an SPO2 of 82% on RA. RN and MD educated patient about new O2 needs related COVID. No dizziness reported.     /72 (BP Location: Left arm)   Pulse 82   Temp 97  F (36.1  C) (Oral)   Resp 20   Wt 77.3 kg (170 lb 6.4 oz)   SpO2 93%   BMI 28.14 kg/m      Brandie Sheets RN on 3/27/2021 at 2:36 PM        "

## 2021-03-27 NOTE — PLAN OF CARE
Patient alert/oriented. Flat affect. Large incontinent episode, required total bed change and shower. Afebrile. Encouraged patient to use IS more frequently.  Loose productive cough. 93% on 2 LPM.  Denies dizziness. Appetite improving.  Overall feeling better today than yesterday.   /72 (BP Location: Left arm)   Pulse 80   Temp 97  F (36.1  C) (Oral)   Resp 22   Wt 77.3 kg (170 lb 6.4 oz)   SpO2 93%   BMI 28.14 kg/m    Noemí Stapleton RN on 3/27/2021 at 6:34 PM    Patient triggered sepsis alert. LA 1.7. VSS.  Noemí Stapleton RN on 3/27/2021 at 10:12 PM

## 2021-03-27 NOTE — PLAN OF CARE
Patient alert/oriented. SBA. Patient c/o sore throat from productive cough. Clear/yellow sputum. Denies SOB or dizziness currently. Cataract in right eye affects vision per patient.   No appetite. Patient triggered sepsis alert: tachy, temp 101.2. /67   Pulse 92   Temp 101.2  F (38.4  C) (Oral)   Resp 20   Wt 77.3 kg (170 lb 6.4 oz)   SpO2 96%   BMI 28.14 kg/m      LA 1.8. Tylenol given prn fever. 96% on 2LPM. LS: crackles posterior.  Noemí Stapleton RN on 3/26/2021 at 10:36 PM

## 2021-03-27 NOTE — PLAN OF CARE
[facility] ADMISSION NOTE    Patient admitted to room 2312 at approximately 2050 via wheel chair from emergency room. Patient was accompanied by transport tech.     Verbal SBAR report received from Nessa prior to patient arrival.     Patient ambulated to bed with stand-by assist. Patient alert and oriented X 3. The patient is not having any pain.  . Admission vital signs: Blood pressure 121/70, pulse 107, temperature 100.3  F (37.9  C), temperature source Oral, resp. rate 20, weight 77.3 kg (170 lb 6.4 oz), SpO2 94 %. Patient was oriented to plan of care, call light, bed controls, tv, telephone, bathroom, and visiting hours.     Risk Assessment    The following safety risks were identified during admission: fall. Yellow risk band applied: YES.     Skin Initial Assessment    This writer admitted this patient and completed a full skin assessment and Ramirez score in the Adult PCS flowsheet. Appropriate interventions initiated as needed.     Secondary skin check completed by N/A--COVID positive.         Education    Patient has a [place] to Observation order: No  Observation education completed and documented: N/A      Noemí Stapleton RN

## 2021-03-27 NOTE — PROGRESS NOTES
[facility]    Hospitalist Progress Note    Date of Service (when I saw the patient): 03/27/2021    Assessment & Plan   Jaime Saucedo is a 70 year old male who was admitted on 3/26/2021 with pneumonia due to COVID-19.    Pneumonia due to COVID 19 infection  Acute hypoxic respiratory failure  Dx with covid positive test 6 days ago.  Sx ongoing x 10 days and still coughing.  O2 sats 92-93%on RA.  CT chest demonstrates diffuse bilateral ground glass opacities. Meets criteria for both decadron (O2 sats <94% on RA) and remdisivir (andrea infil).   - Will check covid labs, start remdisivir/ decadron, d dimer and start anticoagulation.  Discussed with ID, agree with plan.   - Continue with COVID labs, remdesivir, decadron, and lovenox  - Wean supplemental oxygen to maintain saturations > 92%     R eye cataract  Pt to have surgery soon.  Dr Anthony, Ophthalmology, seen pt in ED and thinks diploplia due to cataract. Knows pt well. Please see his consult note in epic when available.      Dizziness  Likely due to R eye cataract causing difficulty in focussing with both eyes and causing dysequilibrium.   MRI negative for acute changes. Doubt CVA.  - Resolved     Leucopenia  Will ck PCT. Follow labs. If PCT elevated, would add emp antbx for possible bacterial superinfection.  - Procalcitonin 0.14, low risk     HTN  Continue meds     HLD  Continue meds     COVID-19 infection  Needs special precautions  .   DVT Prophylaxis: Enoxaparin (Lovenox) SQ  Code Status: No Order    Disposition: Expected discharge in 1-3 days once hypoxia resolved.    Buck Gutierrez    Interval History   The patient is resting in bed.  He states his breathing is better, but nurses not continued hypoxia while ambulating to bathroom.    -Data reviewed today: I reviewed all new labs and imaging results over the last 24 hours. I personally reviewed no images or EKG's today.    Physical Exam   Temp: 97  F (36.1  C) Temp src: Oral  BP: 115/72 Pulse: 82   Resp: 20 SpO2: 93 % O2 Device: Nasal cannula Oxygen Delivery: 2 LPM  Vitals:    03/26/21 1336   Weight: 77.3 kg (170 lb 6.4 oz)     Vital Signs with Ranges  Temp:  [96.1  F (35.6  C)-101.2  F (38.4  C)] 97  F (36.1  C)  Pulse:  [] 82  Resp:  [20-27] 20  BP: (108-131)/(67-85) 115/72  SpO2:  [92 %-97 %] 93 %  I/O last 3 completed shifts:  In: 400 [P.O.:400]  Out: -     Gen: Well nourished, well developed, alert and oriented x 3, no acute distressed  HEENT: Atraumatic, normocephalic; sclera non-injected, anicterric; oral mucosa moist, no lesion, no exudate  Lungs: Bibasilar rales, no wheezes, no rhonchi  Heart: Regular rate, regular rhythm, no gallops, no rubs, no murmurs  GI: Bowel sound normal, no hepatosplenomegaly, no masses, non-tender, non-distended, no guarding, no rebound tenderness  Lymph: No lymphadenopathy, no edema  Skin: No rashes, no chronic venous stasis     Medications     - MEDICATION INSTRUCTIONS -         remdesivir  100 mg Intravenous Q24H    And     sodium chloride 0.9%  50 mL Intravenous Q24H     dexamethasone  6 mg Intravenous Q24H     enoxaparin ANTICOAGULANT  40 mg Subcutaneous Q24H     losartan  25 mg Oral Daily     simvastatin  40 mg Oral At Bedtime     sodium chloride (PF)  3 mL Intracatheter Q8H       Data   Recent Labs   Lab 03/27/21  0452 03/26/21  2135 03/26/21  1259   WBC 3.0* 4.9 3.5*   HGB 14.9 16.0 15.2   MCV 85 85 85   * 149* 142*   INR  --  1.05 1.05    136 133   POTASSIUM 4.2 3.7 3.7   CHLORIDE 104 100 101   CO2 24 24 24   BUN 21 20 21   CR 0.99 1.14 1.11   ANIONGAP 9 12 8   RAUL 8.4* 8.9 8.7   * 94 101*   ALBUMIN  --  3.4 3.2*   PROTTOTAL  --  7.6 7.1   BILITOTAL  --  0.8 0.7   ALKPHOS  --  93 84   ALT  --  34 33   AST  --  45 45   TROPI  --  <0.015 <0.015       Recent Results (from the past 24 hour(s))   Chest CT w/o contrast    Narrative    CT CHEST WITHOUT CONTRAST 3/26/2021 2:59 PM    CLINICAL HISTORY: Shortness of air, recent  COVID positive, bilateral  auscultation abnormality suspect COVID pneumonia.    TECHNIQUE: CT chest without IV contrast. Multiplanar reformats were  obtained. Dose reduction techniques were used.    CONTRAST: None.    COMPARISON: None.    FINDINGS:   LUNGS AND PLEURA: Patchy moderate groundglass opacities are seen  throughout both lungs consistent with COVID-19 pneumonia. No  significant consolidation or crazy paving at this point. Central  airways are patent. No effusions.    MEDIASTINUM/AXILLAE: No lymphadenopathy. No thoracic aortic aneurysm.    UPPER ABDOMEN: Multiple cysts through both kidneys. No follow-up  necessary.    MUSCULOSKELETAL: Unremarkable.      Impression    IMPRESSION:   1.  Groundglass opacities through both lungs consistent with COVID-19  pneumonia.    JORGE L ROJO MD   MR Brain w/o & w Contrast    Narrative    MRI BRAIN WITHOUT AND WITH CONTRAST  3/26/2021 8:12 PM     HISTORY:  Vertigo, diplopia, negative CT/CTA.     TECHNIQUE:  Multiplanar, multisequence MRI of the brain without and  with 7ml Gadavist.     COMPARISON: Head CT from earlier the same day.     FINDINGS: There is no evidence of acute infarct, hemorrhage, mass, or  herniation. Mild diffuse parenchymal volume loss. Moderate patchy deep  and subcortical white matter T2 hyperintensities which are  nonspecific, but likely related to chronic microvascular ischemic  disease. Ventricular size within normal limits without hydrocephalus.     There is no abnormal intracranial enhancement.     Mild scattered mucosal thickening in the paranasal sinuses.      Impression    IMPRESSION:    1. No evidence of acute infarct, mass, hemorrhage, or herniation.  2. Mild diffuse parenchymal volume loss and moderate white matter  changes likely due to chronic microvascular ischemic disease.      SHANELL JONES MD

## 2021-03-27 NOTE — PLAN OF CARE
Denies pain.  Admits to being slightly dizzy when up walking, up to bathroom with SBA.  Denies SOB.  Continuous pulse oximeter in place.  Oxygen on at 2 lpm, saturation levels mid 90's.

## 2021-03-28 LAB
ANION GAP SERPL CALCULATED.3IONS-SCNC: 7 MMOL/L (ref 3–14)
BUN SERPL-MCNC: 27 MG/DL (ref 7–30)
CALCIUM SERPL-MCNC: 8.6 MG/DL (ref 8.5–10.1)
CHLORIDE SERPL-SCNC: 106 MMOL/L (ref 94–109)
CO2 SERPL-SCNC: 25 MMOL/L (ref 20–32)
CREAT SERPL-MCNC: 0.91 MG/DL (ref 0.66–1.25)
CRP SERPL-MCNC: 66.2 MG/L (ref 0–8)
D DIMER PPP FEU-MCNC: 1.8 UG/ML FEU (ref 0–0.5)
ERYTHROCYTE [DISTWIDTH] IN BLOOD BY AUTOMATED COUNT: 11.4 % (ref 10–15)
FIBRINOGEN PPP-MCNC: 610 MG/DL (ref 200–420)
GFR SERPL CREATININE-BSD FRML MDRD: 86 ML/MIN/{1.73_M2}
GLUCOSE SERPL-MCNC: 139 MG/DL (ref 70–99)
HCT VFR BLD AUTO: 42.4 % (ref 40–53)
HGB BLD-MCNC: 14.6 G/DL (ref 13.3–17.7)
MCH RBC QN AUTO: 29.4 PG (ref 26.5–33)
MCHC RBC AUTO-ENTMCNC: 34.4 G/DL (ref 31.5–36.5)
MCV RBC AUTO: 85 FL (ref 78–100)
PLATELET # BLD AUTO: 183 10E9/L (ref 150–450)
POTASSIUM SERPL-SCNC: 4.2 MMOL/L (ref 3.4–5.3)
RBC # BLD AUTO: 4.97 10E12/L (ref 4.4–5.9)
SODIUM SERPL-SCNC: 138 MMOL/L (ref 133–144)
WBC # BLD AUTO: 3.1 10E9/L (ref 4–11)

## 2021-03-28 PROCEDURE — 99232 SBSQ HOSP IP/OBS MODERATE 35: CPT | Performed by: INTERNAL MEDICINE

## 2021-03-28 PROCEDURE — 120N000001 HC R&B MED SURG/OB

## 2021-03-28 PROCEDURE — 86140 C-REACTIVE PROTEIN: CPT | Performed by: INTERNAL MEDICINE

## 2021-03-28 PROCEDURE — 36415 COLL VENOUS BLD VENIPUNCTURE: CPT | Performed by: INTERNAL MEDICINE

## 2021-03-28 PROCEDURE — 85379 FIBRIN DEGRADATION QUANT: CPT | Performed by: INTERNAL MEDICINE

## 2021-03-28 PROCEDURE — 250N000011 HC RX IP 250 OP 636: Performed by: INTERNAL MEDICINE

## 2021-03-28 PROCEDURE — 85384 FIBRINOGEN ACTIVITY: CPT | Performed by: INTERNAL MEDICINE

## 2021-03-28 PROCEDURE — 80048 BASIC METABOLIC PNL TOTAL CA: CPT | Performed by: INTERNAL MEDICINE

## 2021-03-28 PROCEDURE — 258N000003 HC RX IP 258 OP 636: Performed by: INTERNAL MEDICINE

## 2021-03-28 PROCEDURE — 85027 COMPLETE CBC AUTOMATED: CPT | Performed by: INTERNAL MEDICINE

## 2021-03-28 PROCEDURE — 250N000009 HC RX 250: Performed by: INTERNAL MEDICINE

## 2021-03-28 PROCEDURE — 250N000013 HC RX MED GY IP 250 OP 250 PS 637: Performed by: INTERNAL MEDICINE

## 2021-03-28 RX ADMIN — LOSARTAN POTASSIUM 25 MG: 25 TABLET, FILM COATED ORAL at 09:05

## 2021-03-28 RX ADMIN — REMDESIVIR 100 MG: 100 INJECTION, POWDER, LYOPHILIZED, FOR SOLUTION INTRAVENOUS at 18:04

## 2021-03-28 RX ADMIN — DEXAMETHASONE SODIUM PHOSPHATE 6 MG: 4 INJECTION, SOLUTION INTRAMUSCULAR; INTRAVENOUS at 21:20

## 2021-03-28 RX ADMIN — SODIUM CHLORIDE 50 ML: 9 INJECTION, SOLUTION INTRAVENOUS at 21:19

## 2021-03-28 RX ADMIN — SIMVASTATIN 40 MG: 40 TABLET, FILM COATED ORAL at 21:20

## 2021-03-28 RX ADMIN — ENOXAPARIN SODIUM 40 MG: 40 INJECTION SUBCUTANEOUS at 21:20

## 2021-03-28 ASSESSMENT — ACTIVITIES OF DAILY LIVING (ADL)
ADLS_ACUITY_SCORE: 16

## 2021-03-28 NOTE — PLAN OF CARE
Denies vision problems.  Denies dizziness, up to bathroom with SBA with steady gait.  VSS and afebrile.  Oxygen on via NC at 2 lpm, oxygen saturation levels 93-95%.

## 2021-03-28 NOTE — PLAN OF CARE
Patient reports intermittent productive cough with no shortness of breath.  General weakness noted, attempted to wean oxygen, currently requiring 2 liters per nasal cannula, will continue to monitor.

## 2021-03-28 NOTE — PROGRESS NOTES
[facility]    Hospitalist Progress Note    Date of Service (when I saw the patient): 03/28/2021    Assessment & Plan   Jaime Saucedo is a 70 year old male who was admitted on 3/26/2021 with pneumonia due to COVID-19.    Pneumonia due to COVID 19 infection  Acute hypoxic respiratory failure  Dx with covid positive test 6 days ago.  Sx ongoing x 10 days and still coughing.  O2 sats 92-93%on RA.  CT chest demonstrates diffuse bilateral ground glass opacities. Meets criteria for both decadron (O2 sats <94% on RA) and remdisivir (andrea infil).   - Started remdisivir and decadron on admission  - Continue with COVID labs, remdesivir, decadron, and lovenox  - Wean supplemental oxygen to maintain saturations > 92%     R eye cataract  Pt to have surgery soon.  Dr Anthony, Ophthalmology, seen pt in ED and thinks diploplia due to cataract. Knows pt well. Please see his consult note in epic when available.      Dizziness  Likely due to R eye cataract causing difficulty in focussing with both eyes and causing dysequilibrium.   MRI negative for acute changes. Doubt CVA.  - Resolved     Leucopenia  Will ck PCT. Follow labs. If PCT elevated, would add emp antbx for possible bacterial superinfection.  - Procalcitonin 0.14, low risk     HTN  Continue meds     HLD  Continue meds     COVID-19 infection  Needs special precautions  .   DVT Prophylaxis: Enoxaparin (Lovenox) SQ  Code Status: No Order    Disposition: Expected discharge in 1-3 days once hypoxia resolved.    Buck Gutierrez    Interval History   The patient is resting in bed.  No acute complaints.  Hypoxic when ambulating to bathroom.    -Data reviewed today: I reviewed all new labs and imaging results over the last 24 hours. I personally reviewed no images or EKG's today.    Physical Exam   Temp: 97.7  F (36.5  C) Temp src: Oral BP: 120/72 Pulse: 73   Resp: 18 SpO2: 91 % O2 Device: Nasal cannula Oxygen Delivery: 2 LPM  Vitals:    03/26/21 1336    Weight: 77.3 kg (170 lb 6.4 oz)     Vital Signs with Ranges  Temp:  [97  F (36.1  C)-97.7  F (36.5  C)] 97.7  F (36.5  C)  Pulse:  [67-82] 73  Resp:  [18-22] 18  BP: (115-124)/(72-78) 120/72  SpO2:  [89 %-95 %] 91 %  No intake/output data recorded.    Gen: Well nourished, well developed, alert and oriented x 3, no acute distressed  HEENT: Atraumatic, normocephalic; sclera non-injected, anicterric; oral mucosa moist, no lesion, no exudate  Lungs: Bibasilar rales, no wheezes, no rhonchi  Heart: Regular rate, regular rhythm, no gallops, no rubs, no murmurs  GI: Bowel sound normal, no hepatosplenomegaly, no masses, non-tender, non-distended, no guarding, no rebound tenderness  Lymph: No lymphadenopathy, no edema  Skin: No rashes, no chronic venous stasis     Medications     - MEDICATION INSTRUCTIONS -         remdesivir  100 mg Intravenous Q24H    And     sodium chloride 0.9%  50 mL Intravenous Q24H     dexamethasone  6 mg Intravenous Q24H     enoxaparin ANTICOAGULANT  40 mg Subcutaneous Q24H     losartan  25 mg Oral Daily     simvastatin  40 mg Oral At Bedtime     sodium chloride (PF)  3 mL Intracatheter Q8H       Data   Recent Labs   Lab 03/28/21  0441 03/27/21  0452 03/26/21  2135 03/26/21  1259   WBC 3.1* 3.0* 4.9 3.5*   HGB 14.6 14.9 16.0 15.2   MCV 85 85 85 85    138* 149* 142*   INR  --   --  1.05 1.05    137 136 133   POTASSIUM 4.2 4.2 3.7 3.7   CHLORIDE 106 104 100 101   CO2 25 24 24 24   BUN 27 21 20 21   CR 0.91 0.99 1.14 1.11   ANIONGAP 7 9 12 8   RAUL 8.6 8.4* 8.9 8.7   * 133* 94 101*   ALBUMIN  --   --  3.4 3.2*   PROTTOTAL  --   --  7.6 7.1   BILITOTAL  --   --  0.8 0.7   ALKPHOS  --   --  93 84   ALT  --   --  34 33   AST  --   --  45 45   TROPI  --   --  <0.015 <0.015       No results found for this or any previous visit (from the past 24 hour(s)).

## 2021-03-29 VITALS
DIASTOLIC BLOOD PRESSURE: 71 MMHG | BODY MASS INDEX: 28.14 KG/M2 | OXYGEN SATURATION: 91 % | SYSTOLIC BLOOD PRESSURE: 126 MMHG | RESPIRATION RATE: 18 BRPM | WEIGHT: 170.4 LBS | HEART RATE: 75 BPM | TEMPERATURE: 97.1 F

## 2021-03-29 LAB
ANION GAP SERPL CALCULATED.3IONS-SCNC: 7 MMOL/L (ref 3–14)
BUN SERPL-MCNC: 29 MG/DL (ref 7–30)
CALCIUM SERPL-MCNC: 8.4 MG/DL (ref 8.5–10.1)
CHLORIDE SERPL-SCNC: 108 MMOL/L (ref 94–109)
CO2 SERPL-SCNC: 25 MMOL/L (ref 20–32)
CREAT SERPL-MCNC: 0.88 MG/DL (ref 0.66–1.25)
CRP SERPL-MCNC: 30.5 MG/L (ref 0–8)
D DIMER PPP FEU-MCNC: 1.3 UG/ML FEU (ref 0–0.5)
ERYTHROCYTE [DISTWIDTH] IN BLOOD BY AUTOMATED COUNT: 11.4 % (ref 10–15)
FIBRINOGEN PPP-MCNC: 495 MG/DL (ref 200–420)
GFR SERPL CREATININE-BSD FRML MDRD: 88 ML/MIN/{1.73_M2}
GLUCOSE SERPL-MCNC: 150 MG/DL (ref 70–99)
HCT VFR BLD AUTO: 41 % (ref 40–53)
HGB BLD-MCNC: 14.4 G/DL (ref 13.3–17.7)
MCH RBC QN AUTO: 29.9 PG (ref 26.5–33)
MCHC RBC AUTO-ENTMCNC: 35.1 G/DL (ref 31.5–36.5)
MCV RBC AUTO: 85 FL (ref 78–100)
PLATELET # BLD AUTO: 198 10E9/L (ref 150–450)
POTASSIUM SERPL-SCNC: 4.2 MMOL/L (ref 3.4–5.3)
RBC # BLD AUTO: 4.81 10E12/L (ref 4.4–5.9)
SODIUM SERPL-SCNC: 140 MMOL/L (ref 133–144)
WBC # BLD AUTO: 6 10E9/L (ref 4–11)

## 2021-03-29 PROCEDURE — 36415 COLL VENOUS BLD VENIPUNCTURE: CPT | Performed by: INTERNAL MEDICINE

## 2021-03-29 PROCEDURE — 85027 COMPLETE CBC AUTOMATED: CPT | Performed by: INTERNAL MEDICINE

## 2021-03-29 PROCEDURE — 80048 BASIC METABOLIC PNL TOTAL CA: CPT | Performed by: INTERNAL MEDICINE

## 2021-03-29 PROCEDURE — 86140 C-REACTIVE PROTEIN: CPT | Performed by: INTERNAL MEDICINE

## 2021-03-29 PROCEDURE — 250N000013 HC RX MED GY IP 250 OP 250 PS 637: Performed by: INTERNAL MEDICINE

## 2021-03-29 PROCEDURE — 85379 FIBRIN DEGRADATION QUANT: CPT | Performed by: INTERNAL MEDICINE

## 2021-03-29 PROCEDURE — 99239 HOSP IP/OBS DSCHRG MGMT >30: CPT | Performed by: INTERNAL MEDICINE

## 2021-03-29 PROCEDURE — 85384 FIBRINOGEN ACTIVITY: CPT | Performed by: INTERNAL MEDICINE

## 2021-03-29 RX ORDER — CALCIUM CARBONATE 500 MG/1
500 TABLET, CHEWABLE ORAL 3 TIMES DAILY PRN
Status: DISCONTINUED | OUTPATIENT
Start: 2021-03-29 | End: 2021-03-29 | Stop reason: HOSPADM

## 2021-03-29 RX ADMIN — LOSARTAN POTASSIUM 25 MG: 25 TABLET, FILM COATED ORAL at 09:11

## 2021-03-29 RX ADMIN — CALCIUM CARBONATE (ANTACID) CHEW TAB 500 MG 500 MG: 500 CHEW TAB at 14:03

## 2021-03-29 ASSESSMENT — ACTIVITIES OF DAILY LIVING (ADL)
ADLS_ACUITY_SCORE: 16

## 2021-03-29 NOTE — PLAN OF CARE
Vitals stable; denies pain. Up independently. Alert and oriented. Assisted patient with calling his son for discharge ride. Discharge medications given to patient. Paperwork reviewed with patient; verbalized understanding. IV removed. Patient taken out via wheelchair with belongings at 1730

## 2021-03-29 NOTE — PLAN OF CARE
"Patient alert/oriented. Remains at 93% on 2LPM. Encouraged IS use and to not withhold deep breathing d/t cough/sore throat.  Encouraged fluids.  Patient anxious at times--feels \"claustrophobic\" if tray is too close too him and also about his IV and arm position.  Reassurance provided.  VSS. Weaned to 1 LPM--94%  /80 (BP Location: Right arm)   Pulse 68   Temp 97.9  F (36.6  C) (Oral)   Resp 18   Wt 77.3 kg (170 lb 6.4 oz)   SpO2 94%   BMI 28.14 kg/m    Noemí Stapleton RN on 3/28/2021 at 9:35 PM    "

## 2021-03-29 NOTE — DISCHARGE SUMMARY
[facility]  Hospitalist Discharge Summary       Date of Admission:  3/26/2021  Date of Discharge:  3/29/2021  Discharging Provider: Buck Gutierrez MD      Discharge Diagnoses     Pneumonia due to COVID 19 infection  Acute hypoxic respiratory failure  R eye cataract  Dizziness, resolved  Leucopenia, resolved  Hypertension  Hyperlipidemia  COVID-19 infection    Follow-ups Needed After Discharge   Follow-up Appointments     Follow-up and recommended labs and tests       Follow up with primary care provider, [facility],   within 7 days for hospital follow- up.  The following labs/tests are   recommended: BMP and CBC.           Discharge Disposition   Discharged to home  Condition at discharge: Stable    Hospital Course   Jaime Saucedo is a 70 year old male who was admitted on 3/26/2021 with pneumonia due to COVID-19.    Pneumonia due to COVID 19 infection  Acute hypoxic respiratory failure  Dx with covid positive test 6 days ago.  Sx ongoing x 10 days and still coughing.  O2 sats 92-93%on RA.  CT chest demonstrates diffuse bilateral ground glass opacities. Meets criteria for both decadron (O2 sats <94% on RA) and remdisivir (andrea infil).   - Started remdisivir and decadron on admission  - Continue with COVID labs, remdesivir, decadron, and lovenox  - CRP 87.5 -> 81.9 -> 66.2 -> 30.5  - O2 sats remaining above 92% on room air. Able to ambulate to bathroom without dyspnea  - Discharged to home with Xarelto for VTE prophylaxis    R eye cataract  Pt to have surgery soon.  Dr Anthony, Ophthalmology, seen pt in ED and thinks diploplia due to cataract. Knows pt well.    - Follow up with Dr. Anthony as outpatient.     Dizziness  Likely due to R eye cataract causing difficulty in focussing with both eyes and causing dysequilibrium.   MRI negative for acute changes. Doubt CVA.  - Ambulating in room without difficulty  - Resolved     Leucopenia  Will check procalcitonin.   -  Procalcitonin 0.14, low risk  - WBC 6.0 on day of discharge     Hypertension  Continue losartan 25 mg daily     Hyperlipidemia  Continue simvastatin 40 mg at bedtime     COVID-19 infection  Needs special precautions    Consultations This Hospital Stay   OPHTHALMOLOGY IP CONSULT    Code Status   Full Code    Time Spent on this Encounter   I, Buck Gutierrez MD, personally saw the patient today and spent greater than 30 minutes discharging this patient.       Buck Gutierrez MD  [facility]  ______________________________________________________________________    Physical Exam   Vital Signs: Temp: 97.1  F (36.2  C) Temp src: Oral BP: 126/71 Pulse: 75   Resp: 18 SpO2: 91 % O2 Device: None (Room air) Oxygen Delivery: 1 LPM  Weight: 170 lbs 6.4 oz       Gen: Well nourished, well developed, alert and oriented x 3, no acute distressed  HEENT: Atraumatic, normocephalic; sclera non-injected, anicterric; oral mucosa moist, no lesion, no exudate  Lungs: Bibasilar rales, no wheezes, no rhonchi  Heart: Regular rate, regular rhythm, no gallops, no rubs, no murmurs  GI: Bowel sound normal, no hepatosplenomegaly, no masses, non-tender, non-distended, no guarding, no rebound tenderness  Lymph: No lymphadenopathy, no edema  Skin: No rashes, no chronic venous stasis     Primary Care Physician   [facility]    Discharge Orders      COVID-19 GetWell Loop Referral      Reason for your hospital stay    This is a 70 year old male admitted with pneumonia due to COVID-19.     Follow-up and recommended labs and tests     Follow up with primary care provider, [facility], within 7 days for hospital follow- up.  The following labs/tests are recommended: BMP and CBC.     Discharge - Quarantine/Isolation Instruction    Date of symptom onset:     Date of first positive test:    Stay home and away from others (self-isolate) for at least 20 days from when your symptoms started  And...   You've had no fevers and no medicine that reduces fever for 1 full day (24 hours)  And...   Your other symptoms have resolved (gotten better).     Contact provider    Contact your primary care provider if If increased trouble breathing, new arm/leg swelling, dizziness/passing out, falls, bleeding that doesn't stop, or uncontrolled pain.     Activity    Your activity upon discharge: activity as tolerated     Diet    Follow this diet upon discharge: Orders Placed This Encounter  Regular Diet Adult       Significant Results and Procedures   Most Recent 3 CBC's:  Recent Labs   Lab Test 03/29/21  0509 03/28/21  0441 03/27/21  0452   WBC 6.0 3.1* 3.0*   HGB 14.4 14.6 14.9   MCV 85 85 85    183 138*     Most Recent 3 BMP's:  Recent Labs   Lab Test 03/29/21  0509 03/28/21 0441 03/27/21  0452    138 137   POTASSIUM 4.2 4.2 4.2   CHLORIDE 108 106 104   CO2 25 25 24   BUN 29 27 21   CR 0.88 0.91 0.99   ANIONGAP 7 7 9   RAUL 8.4* 8.6 8.4*   * 139* 133*     Most Recent ESR & CRP:  Recent Labs   Lab Test 03/29/21 0509   CRP 30.5*   ,   Results for orders placed or performed during the hospital encounter of 03/26/21   CT Head w/o Contrast    Narrative    CT SCAN OF THE HEAD WITHOUT CONTRAST   3/26/2021 1:13 PM     HISTORY: Code Stroke to evaluate for potential thrombolysis and  thrombectomy. Dizziness. COVID-19 positive patient.    TECHNIQUE:  Axial images of the head and coronal reformations without  IV contrast material.  Radiation dose for this scan was reduced using  automated exposure control, adjustment of the mA and/or kV according  to patient size, or iterative reconstruction technique.    COMPARISON: None.    FINDINGS: There is some mild cerebral atrophy. Patchy white matter  changes are seen in both hemispheres without mass effect. There is no  evidence for intracranial hemorrhage, mass effect, acute infarct, or  skull fracture.      Impression    IMPRESSION:  1. Mild cerebral atrophy.  2.  Moderately extensive white matter changes without mass effect.  These are nonspecific but probably due to chronic small vessel  ischemic disease.  3. No evidence for intracranial hemorrhage or any acute intracranial  process.    SHEY WEBER MD   CTA Head Neck with Contrast    Narrative    CTA HEAD AND NECK WITH CONTRAST 3/26/2021 1:18 PM     HISTORY: Code Stroke to evaluate for potential thrombolysis and  thrombectomy. Acute onset of dizziness.    TECHNIQUE: Axial images were obtained through the head and neck with  intravenous contrast. 70 mL of Isovue-370 was given. Multiplanar  reconstructions were performed. 3-D reconstructions off a remote  workstation for CT angiography were also acquired. Carotid stenoses  were evaluated by comparing the caliber of the proximal internal  carotid artery to the caliber of the distal internal carotid artery.  Radiation dose for this scan was reduced using automated exposure  control, adjustment of the mA and/or kV according to patient size, or  iterative reconstruction technique.    FINDINGS:    Other findings: There are patchy groundglass infiltrates in the upper  lobes bilaterally consistent with COVID-19 diagnosis.    Brachiocephalic vessels: Normal.    Right carotid system: Normal.    Left carotid system: Normal.    Right vertebral artery: Normal.    Left vertebral artery: Normal.    [place]: Normal.      Impression    IMPRESSION:  1. Negative CT angiography of the head and neck.  2. Bilateral groundglass infiltrates in the upper lung zones. This can  be seen in COVID-19 patients, although other pneumonias can also give  this process.    SHEY WEBER MD   Chest CT w/o contrast    Narrative    CT CHEST WITHOUT CONTRAST 3/26/2021 2:59 PM    CLINICAL HISTORY: Shortness of air, recent COVID positive, bilateral  auscultation abnormality suspect COVID pneumonia.    TECHNIQUE: CT chest without IV contrast. Multiplanar reformats were  obtained. Dose reduction techniques  were used.    CONTRAST: None.    COMPARISON: None.    FINDINGS:   LUNGS AND PLEURA: Patchy moderate groundglass opacities are seen  throughout both lungs consistent with COVID-19 pneumonia. No  significant consolidation or crazy paving at this point. Central  airways are patent. No effusions.    MEDIASTINUM/AXILLAE: No lymphadenopathy. No thoracic aortic aneurysm.    UPPER ABDOMEN: Multiple cysts through both kidneys. No follow-up  necessary.    MUSCULOSKELETAL: Unremarkable.      Impression    IMPRESSION:   1.  Groundglass opacities through both lungs consistent with COVID-19  pneumonia.    JORGE L ROJO MD   MR Brain w/o & w Contrast    Narrative    MRI BRAIN WITHOUT AND WITH CONTRAST  3/26/2021 8:12 PM     HISTORY:  Vertigo, diplopia, negative CT/CTA.     TECHNIQUE:  Multiplanar, multisequence MRI of the brain without and  with 7ml Gadavist.     COMPARISON: Head CT from earlier the same day.     FINDINGS: There is no evidence of acute infarct, hemorrhage, mass, or  herniation. Mild diffuse parenchymal volume loss. Moderate patchy deep  and subcortical white matter T2 hyperintensities which are  nonspecific, but likely related to chronic microvascular ischemic  disease. Ventricular size within normal limits without hydrocephalus.     There is no abnormal intracranial enhancement.     Mild scattered mucosal thickening in the paranasal sinuses.      Impression    IMPRESSION:    1. No evidence of acute infarct, mass, hemorrhage, or herniation.  2. Mild diffuse parenchymal volume loss and moderate white matter  changes likely due to chronic microvascular ischemic disease.      SHANELL JONES MD       Discharge Medications   Current Discharge Medication List      START taking these medications    Details   rivaroxaban ANTICOAGULANT (XARELTO) 10 MG TABS tablet Take 1 tablet (10 mg) by mouth daily (with dinner)  Qty: 30 tablet, Refills: 0    Associated Diagnoses: Pneumonia due to 2019 novel coronavirus          CONTINUE these medications which have NOT CHANGED    Details   losartan (COZAAR) 25 MG tablet Take 1 tablet (25 mg) by mouth daily  Qty: 90 tablet, Refills: 0    Associated Diagnoses: Hypertension goal BP (blood pressure) < 140/90      simvastatin (ZOCOR) 40 MG tablet Take 1 tablet (40 mg) by mouth At Bedtime  Qty: 90 tablet, Refills: 1    Associated Diagnoses: Hyperlipidemia LDL goal <130      pseudoePHEDrine (SUDAFED) 30 MG tablet Take 1 tablet (30 mg) by mouth daily as needed for congestion  Qty: 30 tablet, Refills: 0    Associated Diagnoses: Otalgia, bilateral; Nasal sinus congestion           Allergies   No Known Allergies

## 2021-03-29 NOTE — PLAN OF CARE
Pt weaned to 1L oxygen/nc, sats remain between 91-94%. Pt encouraged use of IS, either 10x/hr or if watching TV, during all commercials. Lung sounds diminished bilat. Pt c/o sore throat with coughing, but declined offers of ice chips, tea or warm honey water.

## 2021-03-29 NOTE — PROGRESS NOTES
[facility]    Hospitalist Progress Note    Date of Service (when I saw the patient): 03/29/2021    Assessment & Plan   Jaime Saucedo is a 70 year old male who was admitted on 3/26/2021 with pneumonia due to COVID-19.    Pneumonia due to COVID 19 infection  Acute hypoxic respiratory failure  Dx with covid positive test 6 days ago.  Sx ongoing x 10 days and still coughing.  O2 sats 92-93%on RA.  CT chest demonstrates diffuse bilateral ground glass opacities. Meets criteria for both decadron (O2 sats <94% on RA) and remdisivir (andrea infil).   - Started remdisivir and decadron on admission  - Continue with COVID labs, remdesivir, decadron, and lovenox  - CRP 87.5 -> 81.9 -> 66.2 -> 30.5  - O2 sats remaining above 92% on room air. Able to ambulate to bathroom without dyspnea  - Patient complains of fatigue and malaise, requests to stay an additional night     R eye cataract  Pt to have surgery soon.  Dr Anthony, Ophthalmology, seen pt in ED and thinks diploplia due to cataract. Knows pt well.    - Follow up with Dr. Anthony as outpatient.     Dizziness  Likely due to R eye cataract causing difficulty in focussing with both eyes and causing dysequilibrium.   MRI negative for acute changes. Doubt CVA.  - Resolved     Leucopenia  Will ck PCT. Follow labs. If PCT elevated, would add emp antbx for possible bacterial superinfection.  - Procalcitonin 0.14, low risk     HTN  Continue meds     HLD  Continue meds     COVID-19 infection  Needs special precautions  .   DVT Prophylaxis: Enoxaparin (Lovenox) SQ  Code Status: Full Code    Disposition: Expected discharge in 1-3 days once hypoxia resolved.    Buck Gutierrez    Interval History   The patient is resting in bed.  Dyspnea is improved.  Appetite better.  Complains of fatigue and malaise.    -Data reviewed today: I reviewed all new labs and imaging results over the last 24 hours. I personally reviewed no images or EKG's today.    Physical  Exam   Temp: 97.1  F (36.2  C) Temp src: Oral BP: 126/71 Pulse: 75   Resp: 18 SpO2: 90 % O2 Device: None (Room air) Oxygen Delivery: 1 LPM  Vitals:    03/26/21 1336   Weight: 77.3 kg (170 lb 6.4 oz)     Vital Signs with Ranges  Temp:  [97.1  F (36.2  C)-97.9  F (36.6  C)] 97.1  F (36.2  C)  Pulse:  [61-80] 75  Resp:  [18] 18  BP: (117-132)/(68-81) 126/71  SpO2:  [90 %-94 %] 90 %  I/O last 3 completed shifts:  In: -   Out: 1550 [Urine:1550]    Gen: Well nourished, well developed, alert and oriented x 3, no acute distressed  HEENT: Atraumatic, normocephalic; sclera non-injected, anicterric; oral mucosa moist, no lesion, no exudate  Lungs: Bibasilar rales, no wheezes, no rhonchi  Heart: Regular rate, regular rhythm, no gallops, no rubs, no murmurs  GI: Bowel sound normal, no hepatosplenomegaly, no masses, non-tender, non-distended, no guarding, no rebound tenderness  Lymph: No lymphadenopathy, no edema  Skin: No rashes, no chronic venous stasis     Medications     - MEDICATION INSTRUCTIONS -         remdesivir  100 mg Intravenous Q24H    And     sodium chloride 0.9%  50 mL Intravenous Q24H     dexamethasone  6 mg Intravenous Q24H     enoxaparin ANTICOAGULANT  40 mg Subcutaneous Q24H     losartan  25 mg Oral Daily     simvastatin  40 mg Oral At Bedtime     sodium chloride (PF)  3 mL Intracatheter Q8H       Data   Recent Labs   Lab 03/29/21  0509 03/28/21  0441 03/27/21  0452 03/26/21  2135 03/26/21  1259   WBC 6.0 3.1* 3.0* 4.9 3.5*   HGB 14.4 14.6 14.9 16.0 15.2   MCV 85 85 85 85 85    183 138* 149* 142*   INR  --   --   --  1.05 1.05    138 137 136 133   POTASSIUM 4.2 4.2 4.2 3.7 3.7   CHLORIDE 108 106 104 100 101   CO2 25 25 24 24 24   BUN 29 27 21 20 21   CR 0.88 0.91 0.99 1.14 1.11   ANIONGAP 7 7 9 12 8   RAUL 8.4* 8.6 8.4* 8.9 8.7   * 139* 133* 94 101*   ALBUMIN  --   --   --  3.4 3.2*   PROTTOTAL  --   --   --  7.6 7.1   BILITOTAL  --   --   --  0.8 0.7   ALKPHOS  --   --   --  93 84   ALT  --    --   --  34 33   AST  --   --   --  45 45   TROPI  --   --   --  <0.015 <0.015       No results found for this or any previous visit (from the past 24 hour(s)).

## 2021-03-29 NOTE — DISCHARGE INSTRUCTIONS
Rivaroxaban (By mouth)  Rivaroxaban ([id number]-[id number]-a-ban)    Treats and prevents blood clots, which lowers the risk of stroke, deep vein thrombosis (DVT), pulmonary embolism (PE), heart attack, and similar conditions. Also prevent blood clots in people who are hospitalized for an acute illness. This medicine is a blood thinner.    Brand Name(s):Xarelto,Xarelto Starter Pack  There may be other brand names for this medicine.    When This Medicine Should Not Be Used:  This medicine is not right for everyone. Do not use it if you had an allergic reaction to rivaroxaban, or if you have severe bleeding.    How to Use This Medicine:  Tablet    Your doctor will tell you how much medicine to use. Do not use more than directed. Take this medicine at the same time each day.    2.5 milligram (mg) or 10 mg tablet: Take with or without food.    15 mg or 20 mg tablet: Take with food.    If you cannot swallow the tablets whole, you may crush the tablet and mix it with a small amount of applesauce. Eat some food right after you swallow the mixture.    Tube feeding: You may crush the tablet and mix the medicine in 50 milliliters (mL) of water before giving it via the tube. This must be followed by a feeding.    This medicine should come with a Medication Guide. Ask your pharmacist for a copy if you do not have one.     Missed dose:   o Ask your doctor or pharmacist if you are not sure what to do if you miss a dose.  o Once-daily dose: If you miss a dose or forget to use your medicine, use it as soon as you can on the same day. Do not use extra medicine to make up for a missed dose.  o Twice-daily dose to prevent serious heart or blood vessel problems (2.5 mg tablet): If you miss a dose, skip the missed dose and take your next dose at your usual time.  o Twice-daily dose to treat a blood clot (15 mg tablet): If you miss a dose or forget to use your medicine, use it as soon as you can on the same day. You may take 2 doses at the same time  to make up for the missed dose. This is only for people who take a total of 30 mg per day.    Store the medicine in a closed container at room temperature, away from heat, moisture, and direct light. You may store the crushed tablet with applesauce or water mixture for up to 4 hours.    Drugs and Foods to Avoid:  Ask your doctor or pharmacist before using any other medicine, including over-the-counter medicines, vitamins, and herbal products.    Some medicines can affect how rivaroxaban works. Tell your doctor if you are using any of the following:  o Carbamazepine, conivaptan, erythromycin, indinavir, itraconazole, ketoconazole, lopinavir, phenytoin, rifampin, ritonavir, Deepak's wort  o Another blood thinner (including clopidogrel, enoxaparin, heparin, warfarin)  o NSAID pain or arthritis medicine (including aspirin, celecoxib, diclofenac, ibuprofen, naproxen)    Warnings While Using This Medicine:    Tell your doctor if you are pregnant or breastfeeding, or if you have kidney disease, liver disease, bleeding problems, cancer, lung problems, an artificial heart valve, or antiphospholipid syndrome.    This medicine may increase your risk of bleeding. Be careful to avoid injuries that could cause bleeding. Stay away from rough sports or other situations where you could be bruised, cut, or hurt. Brush and floss your teeth gently. Be careful when using sharp objects, including razors and fingernail clippers. Avoid picking your nose. If you need to blow your nose, blow it gently.    This medicine may cause nerve damage if you have a medical procedure done to your back, including anesthesia or a spinal puncture. This is more likely to happen if you have a history of back injury, back surgery, problems with your spine, or procedures or punctures to your back. Tell your doctor if you are also taking another blood thinner, because this also increases the risk.    Do not stop using this medicine suddenly without asking  your doctor. You might have an increased risk for stroke for a short time after you stop using this medicine.    Tell any doctor or dentist who treats you that you are using this medicine.    Your doctor will do lab tests at regular visits to check on the effects of this medicine. Keep all appointments.     Keep all medicine out of the reach of children. Never share your medicine with anyone.     Possible Side Effects While Using This Medicine:  Call your doctor right away if you notice any of these side effects:    Allergic reaction: Itching or hives, swelling in your face or hands, swelling or tingling in your mouth or throat, chest tightness, trouble breathing    Blistering, peeling, red skin rash    Decrease in how much or how often you urinate    Heavy menstrual bleeding or vaginal bleeding    Red or brown urine, bloody or black, tarry stools    Unusual bleeding or bruising, including frequent nosebleeds    Vomiting of blood or material that looks like coffee grounds    If you notice other side effects that you think are caused by this medicine, tell your doctor.  Call your doctor for medical advice about side effects. You may report side effects to FDA at 4-642-FDA-3156

## 2021-03-29 NOTE — PLAN OF CARE
Vitals stable; denies pain. Up independently. Tolerating regular diet. Alert and oriented. Occasional double vision; has this at baseline. Plan of care reviewed with patient.

## 2021-03-30 ENCOUNTER — TELEPHONE (OUTPATIENT)
Dept: FAMILY MEDICINE | Facility: CLINIC | Age: 71
End: 2021-03-30

## 2021-03-30 ENCOUNTER — PATIENT OUTREACH (OUTPATIENT)
Dept: CARE COORDINATION | Facility: CLINIC | Age: 71
End: 2021-03-30

## 2021-03-30 DIAGNOSIS — U07.1 LAB TEST POSITIVE FOR DETECTION OF COVID-19 VIRUS: Primary | ICD-10-CM

## 2021-03-30 DIAGNOSIS — U07.1 2019 NOVEL CORONAVIRUS DISEASE (COVID-19): Primary | ICD-10-CM

## 2021-03-30 NOTE — LETTER
[facility] CARE COORDINATION  [id number]  [facility] [address]    March 30, 2021    Jaime Saucedo  [address] [facility] [place] [address]      Dear Jaime,    I am a clinic care coordinator who works with Adalberto Gautam MD at [facility] . I recently tried to call and was unable to reach you. Below is a description of clinic care coordination and how I can further assist you.      The clinic care coordination team is made up of a registered nurse,  and community health worker who understand the health care system. The goal of clinic care coordination is to help you manage your health and improve access to the health care system in the most efficient manner. The team can assist you in meeting your health care goals by providing education, coordinating services, strengthening the communication among your providers and supporting you with any resource needs.    Please feel free to contact me at [phone] with any questions or concerns. We are focused on providing you with the highest-quality healthcare experience possible and that all starts with you.     Sincerely,     Sandra Perez RN, [facility] - Primary Care Clinic RN Coordinator  [facility]       Instructions for Patients - you were positive  Your symptoms show that you may have coronavirus (COVID-19). This illness can cause fever, cough and trouble breathing. Many people get a mild case and get better on their own. Some people can get very sick.     Not all patients are tested for COVID-19. If you need to be tested, your care team will let you know.    How can I protect others?    Without a test, we can t know for sure that you have COVID-19. For safety, it s very important to follow these rules.    Stay home and away from others (self-isolate) until:    You ve had no fever--and no medicine that reduces fever--for 1 full day (24 hours), And     Your other symptoms  have resolved (gotten better). For example, your cough or breathing has improved, And     At least 20 days have passed since your symptoms started.    During this time:    Stay in your own room (and use your own bathroom), if you can.    Stay away from others in your home. No hugging, kissing or shaking hands.    No visitors.    Don t go to work, school or anywhere else.     Clean  high touch  surfaces often (doorknobs, counters, handles, etc.). Use a household cleaning spray or wipes.    Cover your mouth and nose with a mask, tissue or washcloth to avoid spreading germs.    Wash your hands and face often. Use soap and water.    For more tips, go to https://www.cdc.gov/coronavirus/2019-ncov/downloads/10Things.pdf.    How can I take care of myself?    1. Get lots of rest. Drink extra fluids (unless a doctor has told you not to).     2. Take Tylenol (acetaminophen) for fever or pain. If you have liver or kidney problems, ask your family doctor if it's okay to take Tylenol.     Adults can take either:     650 mg (two 325 mg pills) every 4 to 6 hours, or     1,000 mg (two 500 mg pills) every 8 hours as needed.     Note: Don't take more than 3,000 mg in one day.   Acetaminophen is found in many medicines (both prescribed and over-the-counter medicines). Read all labels to be sure you don't take too much.   For children, check the Tylenol bottle for the right dose. The dose is based on  the child's age or weight.    3. If you have other health problems (like cancer, heart failure, an organ transplant or severe kidney disease): Call your specialty clinic if you don't feel better in the next 2 days.    4. Know when to call 911: If your breathing is so bad that it keeps you from doing normal activities, call 911 or go to the emergency room. Tell them that you've been staying home and may have COVID-19.      Thank you for taking steps to prevent the spread of this virus.  o Limit your contact with others.  o Wear a simple  mask to cover your cough.  o Wash your hands well and often.  o If you need medical care, go to [email or web address] or contact your health care provider.     For more about COVID-19 and caring for yourself at home, visit the CDC website at https://www.cdc.gov/coronavirus/2019-ncov/about/steps-when-sick.html.     To learn about care at [facility], please go to [email or web address].     [facility] clinical trials (COVID-19 research studies): [email or web address].    Below are the COVID-19 hotlines at the [facility] Health ([facility]). Interpreters are available.     For health questions: Call [phone] or [phone] (7 a.m. to 7 p.m.)    For questions about schools and childcare: Call [phone] or [phone] (7 a.m. to 7 p.m.)

## 2021-03-30 NOTE — PROGRESS NOTES
Clinic Care Coordination Contact  Care Coordination Communication      Clinical Data: Patient meeting criteria for ambulatory care coordination referral due to COVID-19 diagnosis on 3/23/21 and a recent ED/hospitalization encounter on 3/26/21 to 3/29/21at [facility] .     Patient Contact:   Left message with patient   Introduced self and role of care coordination.     [employer] referral placed and discussed with patient:    We know it can be scary to hear that you might have COVID-19. So our team can help track your symptoms and make sure you are doing ok over the next two weeks, we use a program called "[employer]" to keep in touch. When you receive an email from "[employer]", please consider enrolling in our monitoring program. There is no cost to you for monitoring.       No further outreaches planned. Letter sent to patient with COVID instructions and Care Coordination information, should future needs arise.     Sandra Perez RN, [place] [facility] - Primary Care Clinic RN Coordinator  [facility]   3/30/2021      [phone]

## 2021-03-30 NOTE — TELEPHONE ENCOUNTER
Hospital/TCU/ED for chronic condition Discharge Protocol    ED / Discharge Outreach Protocol    Patient Contact    Attempt # 1    Was call answered?  No.  Left message on voicemail with information to call me back.

## 2021-03-31 NOTE — TELEPHONE ENCOUNTER
ER 3/26/21, f/u.  Pt had pneumonia s/p COVID.  LM to call clinic/RN to schedule f/u appt with .  Pt to f/u with PCP 7 days post.     [place]

## 2021-04-08 ENCOUNTER — OFFICE VISIT (OUTPATIENT)
Dept: FAMILY MEDICINE | Facility: CLINIC | Age: 71
End: 2021-04-08
Payer: COMMERCIAL

## 2021-04-08 VITALS
RESPIRATION RATE: 18 BRPM | TEMPERATURE: 97.5 F | DIASTOLIC BLOOD PRESSURE: 64 MMHG | OXYGEN SATURATION: 97 % | HEART RATE: 100 BPM | SYSTOLIC BLOOD PRESSURE: 102 MMHG

## 2021-04-08 DIAGNOSIS — J12.82 PNEUMONIA DUE TO 2019 NOVEL CORONAVIRUS: Primary | ICD-10-CM

## 2021-04-08 DIAGNOSIS — U07.1 PNEUMONIA DUE TO 2019 NOVEL CORONAVIRUS: Primary | ICD-10-CM

## 2021-04-08 DIAGNOSIS — I10 HYPERTENSION GOAL BP (BLOOD PRESSURE) < 140/90: ICD-10-CM

## 2021-04-08 DIAGNOSIS — R09.82 POST-NASAL DRAINAGE: ICD-10-CM

## 2021-04-08 LAB
ANION GAP SERPL CALCULATED.3IONS-SCNC: 3 MMOL/L (ref 3–14)
BASOPHILS # BLD AUTO: 0 10E9/L (ref 0–0.2)
BASOPHILS NFR BLD AUTO: 0.1 %
BUN SERPL-MCNC: 18 MG/DL (ref 7–30)
CALCIUM SERPL-MCNC: 9.3 MG/DL (ref 8.5–10.1)
CHLORIDE SERPL-SCNC: 104 MMOL/L (ref 94–109)
CO2 SERPL-SCNC: 27 MMOL/L (ref 20–32)
CREAT SERPL-MCNC: 1.02 MG/DL (ref 0.66–1.25)
CRP SERPL-MCNC: 16.7 MG/L (ref 0–8)
DIFFERENTIAL METHOD BLD: NORMAL
EOSINOPHIL # BLD AUTO: 0 10E9/L (ref 0–0.7)
EOSINOPHIL NFR BLD AUTO: 0.3 %
ERYTHROCYTE [DISTWIDTH] IN BLOOD BY AUTOMATED COUNT: 12 % (ref 10–15)
GFR SERPL CREATININE-BSD FRML MDRD: 75 ML/MIN/{1.73_M2}
GLUCOSE SERPL-MCNC: 91 MG/DL (ref 70–99)
HCT VFR BLD AUTO: 42.1 % (ref 40–53)
HGB BLD-MCNC: 14.5 G/DL (ref 13.3–17.7)
LYMPHOCYTES # BLD AUTO: 1.4 10E9/L (ref 0.8–5.3)
LYMPHOCYTES NFR BLD AUTO: 19.3 %
MCH RBC QN AUTO: 29.4 PG (ref 26.5–33)
MCHC RBC AUTO-ENTMCNC: 34.4 G/DL (ref 31.5–36.5)
MCV RBC AUTO: 85 FL (ref 78–100)
MONOCYTES # BLD AUTO: 0.9 10E9/L (ref 0–1.3)
MONOCYTES NFR BLD AUTO: 11.9 %
NEUTROPHILS # BLD AUTO: 5 10E9/L (ref 1.6–8.3)
NEUTROPHILS NFR BLD AUTO: 68.4 %
PLATELET # BLD AUTO: 213 10E9/L (ref 150–450)
POTASSIUM SERPL-SCNC: 4.1 MMOL/L (ref 3.4–5.3)
RBC # BLD AUTO: 4.93 10E12/L (ref 4.4–5.9)
SODIUM SERPL-SCNC: 134 MMOL/L (ref 133–144)
WBC # BLD AUTO: 7.3 10E9/L (ref 4–11)

## 2021-04-08 PROCEDURE — 99495 TRANSJ CARE MGMT MOD F2F 14D: CPT | Performed by: NURSE PRACTITIONER

## 2021-04-08 PROCEDURE — 85025 COMPLETE CBC W/AUTO DIFF WBC: CPT | Performed by: NURSE PRACTITIONER

## 2021-04-08 PROCEDURE — 36415 COLL VENOUS BLD VENIPUNCTURE: CPT | Performed by: NURSE PRACTITIONER

## 2021-04-08 PROCEDURE — 80048 BASIC METABOLIC PNL TOTAL CA: CPT | Performed by: NURSE PRACTITIONER

## 2021-04-08 PROCEDURE — 86140 C-REACTIVE PROTEIN: CPT | Performed by: NURSE PRACTITIONER

## 2021-04-08 RX ORDER — FLUTICASONE PROPIONATE 50 MCG
1 SPRAY, SUSPENSION (ML) NASAL 2 TIMES DAILY
Qty: 16 G | Refills: 0 | Status: SHIPPED | OUTPATIENT
Start: 2021-04-08 | End: 2021-07-05

## 2021-04-08 RX ORDER — LOSARTAN POTASSIUM 25 MG/1
25 TABLET ORAL DAILY
Qty: 90 TABLET | Refills: 3 | Status: SHIPPED | OUTPATIENT
Start: 2021-04-08 | End: 2022-01-27

## 2021-04-08 NOTE — LETTER
April 9, 2021      Jaime Saucedo  [address] [address] [address]        Dear ,    We are writing to inform you of your test results.    Your test results fall within the expected range(s) or remain unchanged from previous results.  Please continue with current treatment plan.    Resulted Orders   CBC with platelets and differential   Result Value Ref Range    WBC 7.3 4.0 - 11.0 10e9/L    RBC Count 4.93 4.4 - 5.9 10e12/L    Hemoglobin 14.5 13.3 - 17.7 g/dL    Hematocrit 42.1 40.0 - 53.0 %    MCV 85 78 - 100 fl    MCH 29.4 26.5 - 33.0 pg    MCHC 34.4 31.5 - 36.5 g/dL    RDW 12.0 10.0 - 15.0 %    Platelet Count 213 150 - 450 10e9/L    % Neutrophils 68.4 %    % Lymphocytes 19.3 %    % Monocytes 11.9 %    % Eosinophils 0.3 %    % Basophils 0.1 %    Absolute Neutrophil 5.0 1.6 - 8.3 10e9/L    Absolute Lymphocytes 1.4 0.8 - 5.3 10e9/L    Absolute Monocytes 0.9 0.0 - 1.3 10e9/L    Absolute Eosinophils 0.0 0.0 - 0.7 10e9/L    Absolute Basophils 0.0 0.0 - 0.2 10e9/L    Diff Method Automated Method    Basic metabolic panel  (Ca, Cl, CO2, Creat, Gluc, K, Na, BUN)   Result Value Ref Range    Sodium 134 133 - 144 mmol/L    Potassium 4.1 3.4 - 5.3 mmol/L    Chloride 104 94 - 109 mmol/L    Carbon Dioxide 27 20 - 32 mmol/L    Anion Gap 3 3 - 14 mmol/L    Glucose 91 70 - 99 mg/dL    Urea Nitrogen 18 7 - 30 mg/dL    Creatinine 1.02 0.66 - 1.25 mg/dL    GFR Estimate 75 >60 mL/min/[1.73_m2]      Comment:      Non  GFR Calc  Starting 12/18/2018, serum creatinine based estimated GFR (eGFR) will be   calculated using the Chronic Kidney Disease Epidemiology Collaboration   (CKD-EPI) equation.      GFR Estimate If Black 87 >60 mL/min/[1.73_m2]      Comment:       GFR Calc  Starting 12/18/2018, serum creatinine based estimated GFR (eGFR) will be   calculated using the Chronic Kidney Disease Epidemiology Collaboration   (CKD-EPI) equation.      Calcium 9.3 8.5 - 10.1 mg/dL   CRP,  inflammation   Result Value Ref Range    CRP Inflammation 16.7 (H) 0.0 - 8.0 mg/L       If you have any questions or concerns, please call the clinic at the number listed above.       Sincerely,      EBER Nunez CNP

## 2021-04-08 NOTE — PATIENT INSTRUCTIONS
Now scheduling COVID-19 vaccination appointments who meet the current 1B-  tier 3 guidelines   The [facility] COVID-19 vaccination effort is a phased approach, per guidance from the [place] Department of Health.  .  COVID-19 vaccines require two doses. Please choose a first appointment that allows you to return exactly 21 days later at the same location. Your second vaccine appointment will be scheduled when you receive your first dose.    The direct phone # for vaccine scheduling is: [phone] or you can schedule via [employer]

## 2021-04-08 NOTE — PROGRESS NOTES
"    Assessment & Plan     Hypertension goal BP (blood pressure) < 140/90  Controlled  - losartan (COZAAR) 25 MG tablet; Take 1 tablet (25 mg) by mouth daily  - Basic metabolic panel  (Ca, Cl, CO2, Creat, Gluc, K, Na, BUN)    Pneumonia due to 2019 novel coronavirus  Improved  - CBC with platelets and differential  - CRP, inflammation    Post-nasal drainage    - fluticasone (FLONASE) 50 MCG/ACT nasal spray; Spray 1 spray into both nostrils 2 times daily             BMI:   Estimated body mass index is 28.14 kg/m  as calculated from the following:    Height as of 6/18/20: 1.657 m (5' 5.25\").    Weight as of 3/26/21: 77.3 kg (170 lb 6.4 oz).       FUTURE APPOINTMENTS:       - Follow up in 1 week for persistent sinus symptoms, sooner for new or worsening symptoms.     Patient Instructions   Now scheduling COVID-19 vaccination appointments who meet the current 1B-  tier 3 guidelines   The [facility] COVID-19 vaccination effort is a phased approach, per guidance from the [place] Department of Health.  .  COVID-19 vaccines require two doses. Please choose a first appointment that allows you to return exactly 21 days later at the same location. Your second vaccine appointment will be scheduled when you receive your first dose.    The direct phone # for vaccine scheduling is: [phone] or you can schedule via [employer]          No follow-ups on file.    EBER Nunez CNP  [facility]    Migel Sandoval is a 70 year old who presents for the following health issues  accompanied by his self:    HPI     Hypertension Follow-up      Do you check your blood pressure regularly outside of the clinic? No     Are you following a low salt diet? No    Are your blood pressures ever more than 140 on the top number (systolic) OR more   than 90 on the bottom number (diastolic), for example 140/90? No      How many servings of fruits and vegetables do you eat daily?  0-1    On average, how many " sweetened beverages do you drink each day (Examples: soda, juice, sweet tea, etc.  Do NOT count diet or artificially sweetened beverages)?   0    How many days per week do you exercise enough to make your heart beat faster? 7    How many minutes a day do you exercise enough to make your heart beat faster? 60 or more    How many days per week do you miss taking your medication? 0         Hospital Follow-up Visit:    Hospital/Nursing Home/IP Rehab Facility: [facility]  Date of Admission: 3/26/21  Date of Discharge: 3/29/21  Reason(s) for Admission: pneumonia due to COVID 19      Was your hospitalization related to COVID-19? YES   How are you feeling today? Better  In the past 24 hours have you had shortness of breath when speaking, walking, or climbing stairs? I don't have breathing problems  Do you have a cough? Yes, I have a cough but it's not worse  When is the last time you had a fever greater than 100? Over a month   Are you having any other symptoms? Fatigue, Body aches, Headaches and Confusion   Do you have any other stressors you would like to discuss with your provider? OTHER: vaccination?             Was the patient in the ICU or did the patient experience delirium during hospitalization?  No          Problems taking medications regularly:  None  Medication changes since discharge: None  Problems adhering to non-medication therapy:  None    Summary of hospitalization:  [facility] discharge summary reviewed  Diagnostic Tests/Treatments reviewed.  Follow up needed: labs  Other Healthcare Providers Involved in Patient s Care:         None  Update since discharge: improved.  Additional issues: Coughing at night when lying down, post nasal drainage. Not much coughing during the day, this is better.       Post Discharge Medication Reconciliation: discharge medications reconciled, continue medications without change.  Plan of care communicated with patient              Hypertension  Follow-up      Do you check your blood pressure regularly outside of the clinic? Yes     Are you following a low salt diet? Yes    Are your blood pressures ever more than 140 on the top number (systolic) OR more   than 90 on the bottom number (diastolic), for example 140/90? No          Review of Systems   Constitutional, HEENT, cardiovascular, pulmonary, gi and gu systems are negative, except as otherwise noted.      Objective    /64   Pulse 100   Temp 97.5  F (36.4  C) (Tympanic)   Resp 18   SpO2 97%   There is no height or weight on file to calculate BMI.  Physical Exam   GENERAL: alert and no distress  EYES: Eyes grossly normal to inspection, PERRL and conjunctivae and sclerae normal  HENT: normal cephalic/atraumatic, ear canals and TM's normal, nose and mouth without ulcers or lesions, oropharynx clear, oral mucous membranes moist and sinuses: not tender  NECK: no adenopathy, no asymmetry, masses, or scars and thyroid normal to palpation  RESP: lungs clear to auscultation - no rales, rhonchi or wheezes  CV: regular rates and rhythm, normal S1 S2, no S3 or S4, no murmur, click or rub and no peripheral edema  MS: no gross musculoskeletal defects noted, no edema  NEURO: Normal strength and tone, mentation intact and speech normal

## 2021-04-09 NOTE — RESULT ENCOUNTER NOTE
Please send patient letter notifying of labs and provider message.    Blood counts are all normal, no indication of a bacterial infection. The kidney function and electrolytes are all normal. The inflammation is continuing to trend down from when you were in the hospital. Please let us know if you have any questions.      Thanks,  EBER Kaufman CNP

## 2021-04-19 DIAGNOSIS — Z11.59 ENCOUNTER FOR SCREENING FOR OTHER VIRAL DISEASES: ICD-10-CM

## 2021-04-27 ENCOUNTER — ANESTHESIA EVENT (OUTPATIENT)
Dept: SURGERY | Facility: CLINIC | Age: 71
End: 2021-04-27
Payer: COMMERCIAL

## 2021-04-27 NOTE — ANESTHESIA PREPROCEDURE EVALUATION
Anesthesia Pre-Procedure Evaluation    Patient: Jaime Saucedo   MRN: [id number] : 1950        Preoperative Diagnosis: Nuclear sclerosis [H25.10]   Procedure : Procedure(s):  Cataract extraction with implant.     History reviewed. No pertinent past medical history.   Past Surgical History:   Procedure Laterality Date     ARTHROSCOPY KNEE RT/LT Bilateral     partial meniscectomy     PHACOEMULSIFICATION WITH STANDARD INTRAOCULAR LENS IMPLANT Left 3/6/2019    Procedure: Cataract Removal with Implant;  Surgeon: Johan Anthony MD;  Location: [facility] OR      No Known Allergies   Social History     Tobacco Use     Smoking status: Never Smoker     Smokeless tobacco: Never Used   Substance Use Topics     Alcohol use: Yes     Comment: rare      Wt Readings from Last 1 Encounters:   21 77.3 kg (170 lb 6.4 oz)        Anesthesia Evaluation   Pt has had prior anesthetic. Type: General and MAC.    No history of anesthetic complications       ROS/MED HX  ENT/Pulmonary: Comment: vertigo    (+) CARIDAD risk factors, hypertension,     Neurologic:  - neg neurologic ROS     Cardiovascular:     (+) Dyslipidemia hypertension-----Taking blood thinners Previous cardiac testing   Echo: Date: Results:    Stress Test: Date: Results:    ECG Reviewed: Date: 3-2021 Results:  Sinus Rhythm   - T-abnormality  -Lateral ischemia.     ABNORMAL   Cath: Date: Results:      METS/Exercise Tolerance: >4 METS    Hematologic:  - neg hematologic  ROS     Musculoskeletal:  - neg musculoskeletal ROS     GI/Hepatic:  - neg GI/hepatic ROS     Renal/Genitourinary:  - neg Renal ROS     Endo: Comment: overweight - neg endo ROS     Psychiatric/Substance Use:  - neg psychiatric ROS     Infectious Disease:  - neg infectious disease ROS     Malignancy:  - neg malignancy ROS     Other:  - neg other ROS          Physical Exam    Airway        Mallampati: II   TM distance: > 3 FB   Neck ROM: full   Mouth opening: > 3 cm    Respiratory Devices and  Support         Dental  no notable dental history         Cardiovascular   cardiovascular exam normal          Pulmonary   pulmonary exam normal                OUTSIDE LABS:  CBC:   Lab Results   Component Value Date    WBC 7.3 04/08/2021    WBC 6.0 03/29/2021    HGB 14.5 04/08/2021    HGB 14.4 03/29/2021    HCT 42.1 04/08/2021    HCT 41.0 03/29/2021     04/08/2021     03/29/2021     BMP:   Lab Results   Component Value Date     04/08/2021     03/29/2021    POTASSIUM 4.1 04/08/2021    POTASSIUM 4.2 03/29/2021    CHLORIDE 104 04/08/2021    CHLORIDE 108 03/29/2021    CO2 27 04/08/2021    CO2 25 03/29/2021    BUN 18 04/08/2021    BUN 29 03/29/2021    CR 1.02 04/08/2021    CR 0.88 03/29/2021    GLC 91 04/08/2021     (H) 03/29/2021     COAGS:   Lab Results   Component Value Date    PTT 36 03/26/2021    INR 1.05 03/26/2021    FIBR 495 (H) 03/29/2021     POC:   Lab Results   Component Value Date     (H) 03/26/2021     HEPATIC:   Lab Results   Component Value Date    ALBUMIN 3.4 03/26/2021    PROTTOTAL 7.6 03/26/2021    ALT 34 03/26/2021    AST 45 03/26/2021    ALKPHOS 93 03/26/2021    BILITOTAL 0.8 03/26/2021     OTHER:   Lab Results   Component Value Date    RAUL 9.3 04/08/2021    CRP 16.7 (H) 04/08/2021       Anesthesia Plan    ASA Status:  2      Anesthesia Type: MAC.     - Reason for MAC: immobility needed, straight local not clinically adequate              Consents    Anesthesia Plan(s) and associated risks, benefits, and realistic alternatives discussed. Questions answered and patient/representative(s) expressed understanding.     - Discussed with:  Patient         Postoperative Care            Comments:         H&P reviewed: Unable to attach H&P to encounter due to EHR limitations. H&P Update: appropriate H&P reviewed, patient examined. No interval changes since H&P (within 30 days).         EBER Pollock CRNA

## 2021-04-28 ENCOUNTER — ANESTHESIA (OUTPATIENT)
Dept: SURGERY | Facility: CLINIC | Age: 71
End: 2021-04-28
Payer: COMMERCIAL

## 2021-04-28 ENCOUNTER — HOSPITAL ENCOUNTER (OUTPATIENT)
Facility: CLINIC | Age: 71
Discharge: HOME | End: 2021-04-28
Attending: OPHTHALMOLOGY | Admitting: OPHTHALMOLOGY
Payer: COMMERCIAL

## 2021-04-28 VITALS
DIASTOLIC BLOOD PRESSURE: 75 MMHG | RESPIRATION RATE: 16 BRPM | OXYGEN SATURATION: 98 % | WEIGHT: 165 LBS | BODY MASS INDEX: 26.52 KG/M2 | HEART RATE: 68 BPM | HEIGHT: 66 IN | SYSTOLIC BLOOD PRESSURE: 125 MMHG

## 2021-04-28 PROCEDURE — 258N000003 HC RX IP 258 OP 636: Performed by: NURSE ANESTHETIST, CERTIFIED REGISTERED

## 2021-04-28 PROCEDURE — 250N000011 HC RX IP 250 OP 636: Performed by: NURSE ANESTHETIST, CERTIFIED REGISTERED

## 2021-04-28 PROCEDURE — V2632 POST CHMBR INTRAOCULAR LENS: HCPCS | Performed by: OPHTHALMOLOGY

## 2021-04-28 PROCEDURE — 370N000004 HC ANESTHESIA CATARACT PACKAGE: Performed by: OPHTHALMOLOGY

## 2021-04-28 PROCEDURE — 250N000009 HC RX 250: Performed by: NURSE ANESTHETIST, CERTIFIED REGISTERED

## 2021-04-28 PROCEDURE — 250N000009 HC RX 250: Performed by: OPHTHALMOLOGY

## 2021-04-28 PROCEDURE — 250N000011 HC RX IP 250 OP 636: Performed by: OPHTHALMOLOGY

## 2021-04-28 PROCEDURE — 360N000007 HC CATARACT SURGICAL PACKAGE: Performed by: OPHTHALMOLOGY

## 2021-04-28 PROCEDURE — 761N000008 HC RECOVERY CATRACT PACKAGE: Performed by: OPHTHALMOLOGY

## 2021-04-28 DEVICE — EYE IMP IOL AMO PCL TECNIS ZCB00 20.0: Type: IMPLANTABLE DEVICE | Site: EYE | Status: FUNCTIONAL

## 2021-04-28 RX ORDER — KETOROLAC TROMETHAMINE 5 MG/ML
SOLUTION OPHTHALMIC
COMMUNITY
Start: 2021-04-22 | End: 2021-07-05

## 2021-04-28 RX ORDER — LIDOCAINE 40 MG/G
CREAM TOPICAL
Status: DISCONTINUED | OUTPATIENT
Start: 2021-04-28 | End: 2021-04-28 | Stop reason: HOSPADM

## 2021-04-28 RX ORDER — LIDOCAINE HYDROCHLORIDE 20 MG/ML
JELLY TOPICAL PRN
Status: DISCONTINUED | OUTPATIENT
Start: 2021-04-28 | End: 2021-04-28 | Stop reason: HOSPADM

## 2021-04-28 RX ORDER — PROPARACAINE HYDROCHLORIDE 5 MG/ML
SOLUTION/ DROPS OPHTHALMIC PRN
Status: DISCONTINUED | OUTPATIENT
Start: 2021-04-28 | End: 2021-04-28 | Stop reason: HOSPADM

## 2021-04-28 RX ORDER — BALANCED SALT SOLUTION 6.4; .75; .48; .3; 3.9; 1.7 MG/ML; MG/ML; MG/ML; MG/ML; MG/ML; MG/ML
SOLUTION OPHTHALMIC PRN
Status: DISCONTINUED | OUTPATIENT
Start: 2021-04-28 | End: 2021-04-28 | Stop reason: HOSPADM

## 2021-04-28 RX ORDER — OFLOXACIN 3 MG/ML
SOLUTION/ DROPS OPHTHALMIC
COMMUNITY
Start: 2021-04-22 | End: 2021-07-05

## 2021-04-28 RX ORDER — SODIUM CHLORIDE, SODIUM LACTATE, POTASSIUM CHLORIDE, CALCIUM CHLORIDE 600; 310; 30; 20 MG/100ML; MG/100ML; MG/100ML; MG/100ML
INJECTION, SOLUTION INTRAVENOUS CONTINUOUS
Status: DISCONTINUED | OUTPATIENT
Start: 2021-04-28 | End: 2021-04-28 | Stop reason: HOSPADM

## 2021-04-28 RX ORDER — PREDNISOLONE ACETATE 10 MG/ML
SUSPENSION/ DROPS OPHTHALMIC
COMMUNITY
Start: 2021-04-22 | End: 2021-07-05

## 2021-04-28 RX ORDER — TROPICAMIDE 10 MG/ML
1 SOLUTION/ DROPS OPHTHALMIC
Status: COMPLETED | OUTPATIENT
Start: 2021-04-28 | End: 2021-04-28

## 2021-04-28 RX ADMIN — TROPICAMIDE 1 DROP: 10 SOLUTION/ DROPS OPHTHALMIC at 08:59

## 2021-04-28 RX ADMIN — LIDOCAINE HYDROCHLORIDE 0.3 ML: 10 INJECTION, SOLUTION EPIDURAL; INFILTRATION; INTRACAUDAL; PERINEURAL at 09:09

## 2021-04-28 RX ADMIN — CYCLOPENTOLATE HYDROCHLORIDE AND PHENYLEPHRINE HYDROCHLORIDE 1 DROP: 2; 10 SOLUTION/ DROPS OPHTHALMIC at 08:51

## 2021-04-28 RX ADMIN — CYCLOPENTOLATE HYDROCHLORIDE AND PHENYLEPHRINE HYDROCHLORIDE 1 DROP: 2; 10 SOLUTION/ DROPS OPHTHALMIC at 08:59

## 2021-04-28 RX ADMIN — CYCLOPENTOLATE HYDROCHLORIDE AND PHENYLEPHRINE HYDROCHLORIDE 1 DROP: 2; 10 SOLUTION/ DROPS OPHTHALMIC at 09:10

## 2021-04-28 RX ADMIN — TROPICAMIDE 1 DROP: 10 SOLUTION/ DROPS OPHTHALMIC at 09:10

## 2021-04-28 RX ADMIN — MIDAZOLAM 2 MG: 1 INJECTION INTRAMUSCULAR; INTRAVENOUS at 09:52

## 2021-04-28 RX ADMIN — TROPICAMIDE 1 DROP: 10 SOLUTION/ DROPS OPHTHALMIC at 08:51

## 2021-04-28 RX ADMIN — SODIUM CHLORIDE, POTASSIUM CHLORIDE, SODIUM LACTATE AND CALCIUM CHLORIDE: 600; 310; 30; 20 INJECTION, SOLUTION INTRAVENOUS at 09:09

## 2021-04-28 ASSESSMENT — MIFFLIN-ST. JEOR: SCORE: 1461.19

## 2021-04-28 NOTE — ANESTHESIA CARE TRANSFER NOTE
Patient: Jaime Saucedo    Procedure(s):  Cataract extraction with implant.    Diagnosis: Nuclear sclerosis [H25.10]  Diagnosis Additional Information: No value filed.    Anesthesia Type:   MAC     Note:    Oropharynx: oropharynx clear of all foreign objects  Level of Consciousness: awake  Oxygen Supplementation: room air    Independent Airway: airway patency satisfactory and stable  Dentition: dentition unchanged  Vital Signs Stable: post-procedure vital signs reviewed and stable  Report to RN Given: handoff report given  Patient transferred to: Phase II    Handoff Report: Identifed the Patient, Identified the Reponsible Provider, Reviewed the pertinent medical history, Discussed the surgical course, Reviewed Intra-OP anesthesia mangement and issues during anesthesia, Set expectations for post-procedure period and Allowed opportunity for questions and acknowledgement of understanding      Vitals: (Last set prior to Anesthesia Care Transfer)  CRNA VITALS  4/28/2021 0941 - 4/28/2021 1011      4/28/2021             Pulse:  64    SpO2:  96 %        Electronically Signed By: EBER iLu CRNA  April 28, 2021  10:11 AM

## 2021-04-28 NOTE — OP NOTE
OPHTHALMOLOGY OPERATIVE NOTE    PATIENT: Jaime Saucedo  DATE OF SURGERY: 4/28/2021  PREOPERATIVE DIAGNOSIS:  Senile Nuclear Cataract, Right eye  POSTOPERATIVE DIAGNOSIS:  Senile Nuclear Cataract, Right eye  OPERATIVE PROCEDURE:  Phacoemulsification with placement of intraocular lens  SURGEON:  Johan Anthony MD  ANESTHESIA:  Topical / MAC  EBL:  None  SPECIMENS:  None  COMPLICATIONS:  None    PROCEDURE:  The patient was brought to the operating room at [facility].  The right eye was prepped and draped in the usual fashion for cataract surgery.  A wire lid speculum was inserted.  A super sharp blade was used to make a paracentesis at the 11 O'clock position.  The super sharp blade was used to make a partial thickness temporal groove, which was 3 mm in length.  0.8 mL of non-preserved epi-Shugarcaine was injected into the anterior chamber.  Viscoelastic was used to inflate the anterior chamber through a cannula.  A 2.5 mm microkeratome was used to make a temporal clear corneal incision in a two-plane fashion.  A cystotome needle and forceps were used to make a capsulorrhexis.  Hydrodissection and hydrodelineation were performed with Balance Salt Solution.  The lens was then phacoemulsified and removed without complications.  The cortical material was removed with bimanual irrigation and aspiration.  The capsular bag was filled with viscoelastic.  A posterior chamber intraocular lens, preselected and recorded, was folded and inserted into the capsular bag.  The viscoelastic was removed with the irrigation and aspiration tip.  Balanced Salt Solution with Vigamox, 150mg/0.1mL, was used to refill the anterior chamber.  The wounds were checked for water tightness and required no suture.  The wire lid speculum was removed.  The patient's right eye was cleaned and a drop of each post-operative drop was placed, followed by a cooper shield.  The patient tolerated the procedure well, and there  were no complications.      Johan Anthony MD

## 2021-04-28 NOTE — ANESTHESIA POSTPROCEDURE EVALUATION
Patient: Jaime Saucedo    Procedure(s):  Cataract extraction with implant.    Diagnosis:Nuclear sclerosis [H25.10]  Diagnosis Additional Information: No value filed.    Anesthesia Type:  MAC    Note:  Disposition: Outpatient   Postop Pain Control: Uneventful            Sign Out: Well controlled pain   PONV: No   Neuro/Psych: Uneventful            Sign Out: Acceptable/Baseline neuro status   Airway/Respiratory: Uneventful            Sign Out: Acceptable/Baseline resp. status   CV/Hemodynamics: Uneventful            Sign Out: Acceptable CV status; No obvious hypovolemia; No obvious fluid overload   Other NRE: NONE   DID A NON-ROUTINE EVENT OCCUR?            Last vitals:  Vitals:    04/28/21 0839   BP: 129/77   Resp: 16   SpO2: 100%       Last vitals prior to Anesthesia Care Transfer:  CRNA VITALS  4/28/2021 0941 - 4/28/2021 1012      4/28/2021             Pulse:  64    SpO2:  96 %          Electronically Signed By: EBER Liu CRNA  April 28, 2021  10:12 AM

## 2021-07-05 ENCOUNTER — OFFICE VISIT (OUTPATIENT)
Dept: FAMILY MEDICINE | Facility: CLINIC | Age: 71
End: 2021-07-05
Payer: COMMERCIAL

## 2021-07-05 ENCOUNTER — TELEPHONE (OUTPATIENT)
Dept: FAMILY MEDICINE | Facility: CLINIC | Age: 71
End: 2021-07-05

## 2021-07-05 VITALS
HEART RATE: 70 BPM | WEIGHT: 173 LBS | TEMPERATURE: 97.1 F | OXYGEN SATURATION: 98 % | HEIGHT: 66 IN | SYSTOLIC BLOOD PRESSURE: 110 MMHG | RESPIRATION RATE: 18 BRPM | BODY MASS INDEX: 27.8 KG/M2 | DIASTOLIC BLOOD PRESSURE: 74 MMHG
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
## 2021-07-05 PROCEDURE — 99213 OFFICE O/P EST LOW 20 MIN: CPT | Performed by: FAMILY MEDICINE

## 2021-07-05 RX ORDER — DOXYCYCLINE HYCLATE 100 MG
200 TABLET ORAL ONCE
Qty: 2 TABLET | Refills: 0 | Status: SHIPPED | OUTPATIENT
Start: 2021-07-05 | End: 2021-07-05

## 2021-07-05 RX ORDER — DOXYCYCLINE HYCLATE 100 MG
100 TABLET ORAL ONCE
Qty: 2 TABLET | Refills: 0 | Status: SHIPPED | OUTPATIENT
Start: 2021-07-05 | End: 2021-07-05

## 2021-07-05 ASSESSMENT — MIFFLIN-ST. JEOR: SCORE: 1492.47

## 2021-07-05 NOTE — TELEPHONE ENCOUNTER
Patient was seen in clinic today and was prescribed doxycycline for tick bite. Pharmacy is questioning instructions as it does not match tablets dispensed. Pharmacy is asking for clarification and a new prescription. Script is pended with one tablet only.  Routed to provider.  Theresa Ndiaye RN

## 2021-07-05 NOTE — PROGRESS NOTES
"    Assessment & Plan     Tick bite, initial encounter  Low likelihood of this transmitting lyme, however risk of the doxy is low    - doxycycline hyclate (VIBRA-TABS) 100 MG tablet; Take 1 tablet (100 mg) by mouth once for 1 dose    Tick precautions disucssed             No follow-ups on file.    Graciela Gray MD  [facility]    Migel Sandoval is a 71 year old who presents for the following health issues  accompanied by his self:    HPI     Concern - tick bite   Onset: last pm  Description: patient had a tick bite was taken off last pm located on back of R/thigh he can not see if there is any redness does not have pain. Patient did have a tick bite 10 yrs ago and was given antibiotic   Intensity: none   Progression of Symptoms: can not see the sight   Accompanying Signs & Symptoms: tick bite   Previous history of similar problem: yes 10yrs ago was given antibiotics   Precipitating factors:        Worsened by:   Alleviating factors:        Improved by:   Therapies tried and outcome: patient did use some neosporin and bacitracin        Review of Systems   Constitutional, HEENT, cardiovascular, pulmonary, gi and gu systems are negative, except as otherwise noted.      Objective    /74   Pulse 70   Temp 97.1  F (36.2  C) (Tympanic)   Resp 18   Ht 1.676 m (5' 6\")   Wt 78.5 kg (173 lb)   SpO2 98%   BMI 27.92 kg/m    Body mass index is 27.92 kg/m .  Physical Exam   GENERAL: healthy, alert and no distress  SKIN: no suspicious lesions or rashes  Tick bite site (right groin) is unremarkable              "

## 2021-07-15 DIAGNOSIS — E78.5 HYPERLIPIDEMIA LDL GOAL <130: ICD-10-CM

## 2021-07-15 RX ORDER — SIMVASTATIN 40 MG
TABLET ORAL
Qty: 90 TABLET | Refills: 1 | Status: SHIPPED | OUTPATIENT
Start: 2021-07-15 | End: 2022-01-11

## 2021-07-15 NOTE — TELEPHONE ENCOUNTER
Pt calling for another 6mo refill on Simvastatin. Pt would like TODAY/ASAP. No need to call patient back, unless there are questions or problems.

## 2021-07-19 ENCOUNTER — TRANSFERRED RECORDS (OUTPATIENT)
Dept: HEALTH INFORMATION MANAGEMENT | Facility: CLINIC | Age: 71
End: 2021-07-19

## 2021-10-25 ENCOUNTER — TRANSFERRED RECORDS (OUTPATIENT)
Dept: HEALTH INFORMATION MANAGEMENT | Facility: CLINIC | Age: 71
End: 2021-10-25
Payer: COMMERCIAL

## 2022-01-11 DIAGNOSIS — E78.5 HYPERLIPIDEMIA LDL GOAL <130: ICD-10-CM

## 2022-01-12 NOTE — TELEPHONE ENCOUNTER
Pending Prescriptions:                       Disp   Refills    simvastatin (ZOCOR) 40 MG tablet           90 tab*1        Sig: Take 1 tablet (40 mg) by mouth    Routing refill request to provider for review/approval because:  Labs not current:    LDL Cholesterol Calculated   Date Value Ref Range Status   11/18/2020 173 (H) <=130 mg/dL Final     Sandra Santos RN

## 2022-01-13 RX ORDER — SIMVASTATIN 40 MG
TABLET ORAL
Qty: 90 TABLET | Refills: 0 | Status: SHIPPED | OUTPATIENT
Start: 2022-01-13 | End: 2022-01-27

## 2022-01-13 NOTE — TELEPHONE ENCOUNTER
Pharmacy tech brought over refill request for simvastatin, request had already been forwarded to Dr Gautam yesterday, waiting for approval. RN unable to authorize medication refill as protocol failed due to overdue labs. Ludivina Mcmahon RN

## 2022-01-13 NOTE — TELEPHONE ENCOUNTER
Due for face to face office visit. Refilled x 90 days. Further refills will be addressed at visit.

## 2022-01-14 NOTE — TELEPHONE ENCOUNTER
Spoke to patient and was notified. Patient has a telephone appointment Jan 27th for med check and was hoping if he can keep this telephone visit instead of face to face. Patient is worried of the new virus.    Phylils Coreas on 1/14/2022 at 9:24 AM

## 2022-01-27 ENCOUNTER — VIRTUAL VISIT (OUTPATIENT)
Dept: FAMILY MEDICINE | Facility: CLINIC | Age: 72
End: 2022-01-27
Payer: COMMERCIAL

## 2022-01-27 DIAGNOSIS — I10 HYPERTENSION GOAL BP (BLOOD PRESSURE) < 140/90: Primary | ICD-10-CM

## 2022-01-27 DIAGNOSIS — G25.81 RESTLESS LEGS SYNDROME (RLS): ICD-10-CM

## 2022-01-27 DIAGNOSIS — Z11.59 NEED FOR HEPATITIS C SCREENING TEST: ICD-10-CM

## 2022-01-27 DIAGNOSIS — E78.5 HYPERLIPIDEMIA LDL GOAL <130: ICD-10-CM

## 2022-01-27 PROCEDURE — 99214 OFFICE O/P EST MOD 30 MIN: CPT | Mod: 95 | Performed by: FAMILY MEDICINE

## 2022-01-27 RX ORDER — LOSARTAN POTASSIUM 25 MG/1
12.5 TABLET ORAL DAILY
Qty: 45 TABLET | Refills: 4 | Status: SHIPPED | OUTPATIENT
Start: 2022-01-27 | End: 2023-03-14

## 2022-01-27 RX ORDER — SIMVASTATIN 40 MG
40 TABLET ORAL AT BEDTIME
Qty: 90 TABLET | Refills: 4 | Status: SHIPPED | OUTPATIENT
Start: 2022-01-27 | End: 2023-04-05

## 2022-01-27 NOTE — NURSING NOTE
"Initial There were no vitals taken for this visit. Estimated body mass index is 27.92 kg/m  as calculated from the following:    Height as of 7/5/21: 1.676 m (5' 6\").    Weight as of 7/5/21: 78.5 kg (173 lb). .      "

## 2022-01-27 NOTE — PROGRESS NOTES
"Jaime is a 71 year old who is being evaluated via a billable telephone visit.      What phone number would you like to be contacted at? [phone]  How would you like to obtain your AVS? Mail a copy    Assessment & Plan     Hypertension goal BP (blood pressure) < 140/90  Well controlled. Refilled medication. Check labs.    - Comprehensive metabolic panel; Future  - losartan (COZAAR) 25 MG tablet; Take 0.5 tablets (12.5 mg) by mouth daily Take 1/2 tablet daily    Hyperlipidemia LDL goal <130  Well controlled. Refilled medication.  ;  - Lipid panel reflex to direct LDL Fasting; Future  - simvastatin (ZOCOR) 40 MG tablet; Take 1 tablet (40 mg) by mouth At Bedtime    Restless legs syndrome (RLS)  Discussed medication options at this point would like to hold off on prescriptions.    Need for hepatitis C screening test  - Hepatitis C Screen Reflex to HCV RNA Quant and Genotype; Future         BMI:   Estimated body mass index is 27.92 kg/m  as calculated from the following:    Height as of 7/5/21: 1.676 m (5' 6\").    Weight as of 7/5/21: 78.5 kg (173 lb).           No follow-ups on file.    Adalberto Gautam MD  [facility]    Subjective   Jaiem is a 71 year old who presents for the following health issues     HPI     Hyperlipidemia Follow-Up      Are you regularly taking any medication or supplement to lower your cholesterol?   Yes- simvastatin    Are you having muscle aches or other side effects that you think could be caused by your cholesterol lowering medication?  No    Hypertension Follow-up      Do you check your blood pressure regularly outside of the clinic? Yes     Are you following a low salt diet? No    Are your blood pressures ever more than 140 on the top number (systolic) OR more   than 90 on the bottom number (diastolic), for example 140/90? Yes      How many servings of fruits and vegetables do you eat daily?  0-1    On average, how many sweetened beverages do you drink " each day (Examples: soda, juice, sweet tea, etc.  Do NOT count diet or artificially sweetened beverages)?   0    How many days per week do you exercise enough to make your heart beat faster? 3 or less    How many minutes a day do you exercise enough to make your heart beat faster? 10 - 19    How many days per week do you miss taking your medication? 0        Review of Systems   Constitutional, neuro, ENT, endocrine, pulmonary, cardiac, gastrointestinal, genitourinary, musculoskeletal, integument and psychiatric systems are negative, except as otherwise noted.       Objective           Vitals:  No vitals were obtained today due to virtual visit.    Physical Exam   healthy, alert and no distress  PSYCH: Alert and oriented times 3; coherent speech, normal   rate and volume, able to articulate logical thoughts, able   to abstract reason, no tangential thoughts, no hallucinations   or delusions  His affect is normal  RESP: No cough, no audible wheezing, able to talk in full sentences  Remainder of exam unable to be completed due to telephone visits                Phone call duration: 14 minutes

## 2022-02-04 ENCOUNTER — TRANSFERRED RECORDS (OUTPATIENT)
Dept: HEALTH INFORMATION MANAGEMENT | Facility: CLINIC | Age: 72
End: 2022-02-04
Payer: COMMERCIAL

## 2022-02-04 LAB — RETINOPATHY: NEGATIVE

## 2022-02-09 NOTE — PATIENT INSTRUCTIONS
Your BMI is Body mass index is There is no height or weight on file to calculate BMI.     A BMI of 18.5 to 24.9 is in the healthy range. A person with a BMI of 25 to 29.9 is considered overweight, and someone with a BMI of 30 or greater is considered obese. More than two-thirds of American adults are considered overweight or obese.  Weight management is a personal decision.  If you are interested in exploring weight loss strategies, the following discussion covers the approaches that may be successful. Body mass index (BMI) is one way to tell whether you are at a healthy weight, overweight, or obese. It measures your weight in relation to your height.  Being overweight or obese increases the risk for further weight gain. Excess weight may lead to heart disease and diabetes.  Creating and following plans for healthy eating and physical activity may help you improve your health.  Weight control is part of healthy lifestyle and includes exercise, emotional health, and healthy eating habits. Careful eating habits lifelong are the mainstay of weight control. Though there are significant health benefits from weight loss, long-term weight loss with diet alone may be very difficult to achieve- studies show long-term success with dietary management alone in less than 10% of people. Attaining a healthy weight may be especially difficult to achieve in those with severe obesity. In some cases, medications, devices and surgical management might be considered.  What can you do?    Keep a food journal to help with mindful eating and finding ways to modify your diet.      Reduce the amount of processed food in your diet. Focus on adding vegetables, and lean proteins.    Reduce dietary carbohydrates. Limiting to  gm of carbohydrates per day has been shows to help boost weight loss.  If you have diabetes or are on diabetic medications do not do this without talking to your physician or healthcare provider.    Diet combined with  exercise helps maintain muscle while optimizing fat loss. Strength training is particularly important for building and maintaining muscle mass. Exercise helps reduce stress, increase energy, and improves fitness. Increasing exercise without diet control, however, may not burn enough calories to loose weight.         Start walking three days a week 10-20 minutes at a time    Work towards walking thirty minutes five days a week      Eventually, increase the speed of your walking for 1-2 minutes at time    In addition, we recommend that you review healthy lifestyles and methods for weight loss available through the National Institutes of Health patient information sites:  http://win.niddk.nih.gov/publications/index.htm    Also look into health and wellness programs that may be available through your health insurance provider, employer, local community center, or joey club.

## 2022-03-08 ENCOUNTER — OFFICE VISIT (OUTPATIENT)
Dept: FAMILY MEDICINE | Facility: CLINIC | Age: 72
End: 2022-03-08
Payer: COMMERCIAL

## 2022-03-08 VITALS
HEART RATE: 74 BPM | WEIGHT: 179 LBS | HEIGHT: 66 IN | SYSTOLIC BLOOD PRESSURE: 124 MMHG | OXYGEN SATURATION: 96 % | TEMPERATURE: 97.6 F | RESPIRATION RATE: 14 BRPM | BODY MASS INDEX: 28.77 KG/M2 | DIASTOLIC BLOOD PRESSURE: 76 MMHG

## 2022-03-08 DIAGNOSIS — K13.1 BITING OF ORAL MUCOSA: ICD-10-CM

## 2022-03-08 DIAGNOSIS — L82.0 INFLAMED SEBORRHEIC KERATOSIS: Primary | ICD-10-CM

## 2022-03-08 LAB
ALBUMIN SERPL-MCNC: 3.8 G/DL (ref 3.4–5)
ALP SERPL-CCNC: 74 U/L (ref 40–150)
ALT SERPL W P-5'-P-CCNC: 38 U/L (ref 0–70)
ANION GAP SERPL CALCULATED.3IONS-SCNC: 3 MMOL/L (ref 3–14)
AST SERPL W P-5'-P-CCNC: 22 U/L (ref 0–45)
BILIRUB SERPL-MCNC: 0.7 MG/DL (ref 0.2–1.3)
BUN SERPL-MCNC: 23 MG/DL (ref 7–30)
CALCIUM SERPL-MCNC: 9.3 MG/DL (ref 8.5–10.1)
CHLORIDE BLD-SCNC: 109 MMOL/L (ref 94–109)
CHOLEST SERPL-MCNC: 135 MG/DL
CO2 SERPL-SCNC: 27 MMOL/L (ref 20–32)
CREAT SERPL-MCNC: 1.07 MG/DL (ref 0.66–1.25)
FASTING STATUS PATIENT QL REPORTED: NO
GFR SERPL CREATININE-BSD FRML MDRD: 75 ML/MIN/1.73M2
GLUCOSE BLD-MCNC: 95 MG/DL (ref 70–99)
HDLC SERPL-MCNC: 56 MG/DL
LDLC SERPL CALC-MCNC: 59 MG/DL
NONHDLC SERPL-MCNC: 79 MG/DL
POTASSIUM BLD-SCNC: 4.3 MMOL/L (ref 3.4–5.3)
PROT SERPL-MCNC: 7.5 G/DL (ref 6.8–8.8)
SODIUM SERPL-SCNC: 139 MMOL/L (ref 133–144)
TRIGL SERPL-MCNC: 100 MG/DL

## 2022-03-08 PROCEDURE — 86803 HEPATITIS C AB TEST: CPT | Performed by: NURSE PRACTITIONER

## 2022-03-08 PROCEDURE — 36415 COLL VENOUS BLD VENIPUNCTURE: CPT | Performed by: NURSE PRACTITIONER

## 2022-03-08 PROCEDURE — 99213 OFFICE O/P EST LOW 20 MIN: CPT | Performed by: NURSE PRACTITIONER

## 2022-03-08 PROCEDURE — 80061 LIPID PANEL: CPT | Performed by: NURSE PRACTITIONER

## 2022-03-08 PROCEDURE — 80053 COMPREHEN METABOLIC PANEL: CPT | Performed by: NURSE PRACTITIONER

## 2022-03-08 ASSESSMENT — PAIN SCALES - GENERAL: PAINLEVEL: MILD PAIN (3)

## 2022-03-08 NOTE — LETTER
March 9, 2022      Jaime Saucedo  [address] [address] [address]        Dear ,    We are writing to inform you of your test results.    Normal cholesterol   No Hep C   Metabolic panel is normal, meaning electrolytes, blood sugar, and kidney function are all normal.      Resulted Orders   Lipid panel reflex to direct LDL Fasting   Result Value Ref Range    Cholesterol 135 <200 mg/dL    Triglycerides 100 <150 mg/dL    Direct Measure HDL 56 >=40 mg/dL    LDL Cholesterol Calculated 59 <=100 mg/dL    Non HDL Cholesterol 79 <130 mg/dL    Patient Fasting > 8hrs? No     Narrative    Cholesterol  Desirable:  <200 mg/dL    Triglycerides  Normal:  Less than 150 mg/dL  Borderline High:  150-199 mg/dL  High:  200-499 mg/dL  Very High:  Greater than or equal to 500 mg/dL    Direct Measure HDL  Female:  Greater than or equal to 50 mg/dL   Male:  Greater than or equal to 40 mg/dL    LDL Cholesterol  Desirable:  <100mg/dL  Above Desirable:  100-129 mg/dL   Borderline High:  130-159 mg/dL   High:  160-189 mg/dL   Very High:  >= 190 mg/dL    Non HDL Cholesterol  Desirable:  130 mg/dL  Above Desirable:  130-159 mg/dL  Borderline High:  160-189 mg/dL  High:  190-219 mg/dL  Very High:  Greater than or equal to 220 mg/dL   Comprehensive metabolic panel   Result Value Ref Range    Sodium 139 133 - 144 mmol/L    Potassium 4.3 3.4 - 5.3 mmol/L    Chloride 109 94 - 109 mmol/L    Carbon Dioxide (CO2) 27 20 - 32 mmol/L    Anion Gap 3 3 - 14 mmol/L    Urea Nitrogen 23 7 - 30 mg/dL    Creatinine 1.07 0.66 - 1.25 mg/dL    Calcium 9.3 8.5 - 10.1 mg/dL    Glucose 95 70 - 99 mg/dL    Alkaline Phosphatase 74 40 - 150 U/L    AST 22 0 - 45 U/L    ALT 38 0 - 70 U/L    Protein Total 7.5 6.8 - 8.8 g/dL    Albumin 3.8 3.4 - 5.0 g/dL    Bilirubin Total 0.7 0.2 - 1.3 mg/dL    GFR Estimate 75 >60 mL/min/1.73m2      Comment:      Effective December 21, 2021 eGFRcr in adults is calculated using the 2021 CKD-EPI creatinine equation  which includes age and gender (Aundrea et al., NEJM, DOI: 10.1056/DWECtl9321760)   Hepatitis C Screen Reflex to HCV RNA Quant and Genotype   Result Value Ref Range    Hepatitis C Antibody Nonreactive Nonreactive    Narrative    Assay performance characteristics have not been established for newborns, infants, and children.       If you have any questions or concerns, please call the clinic at the number listed above.       Sincerely,      Adalberto Gautam MD

## 2022-03-08 NOTE — PROGRESS NOTES
Assessment & Plan     Inflamed seborrheic keratosis  Applied cryo x 3 with short bursts to the lesion on the left cheek.  Referral placed to dermatology if this does not resolve after treatment for further evaluation and removal if needed.  - Adult Dermatology Referral; Future    Biting of oral mucosa  Oral mucosa is irritated by tooth.  Recommended follow-up with dental provider to see if there is anything they can do to reduce symptoms.    See Patient Instructions    Return if symptoms worsen or fail to improve.    Rafaela Carey NP  [facility]   Jaime is a 71 year old who presents for the following health issues     Mouth/Lip Problem  This is a recurrent problem. The current episode started more than 1 month ago. The problem occurs constantly. The problem has been waxing and waning. The symptoms are aggravated by eating. He has tried immobilization for the symptoms. The treatment provided mild relief.   History of Present Illness       Hyperlipidemia:  He presents for follow up of hyperlipidemia.  He is taking medication to lower cholesterol. He is not having myalgia or other side effects to statin medications.    Hypertension: He presents for follow up of hypertension.  He does check blood pressure  regularly outside of the clinic. Outpatient blood pressures have not been over 140/90. He does not follow a low salt diet.     Symptom onset:  More than a month  Symptom intensity:  Mild  Symptom progression:  Improving  Had these symptoms before:  Yes    He eats 0-1 servings of fruits and vegetables daily.He consumes 0 sweetened beverage(s) daily.He exercises with enough effort to increase his heart rate 20 to 29 minutes per day.  He exercises with enough effort to increase his heart rate 7 days per week.   He is taking medications regularly.     Patient has tried mouth guard for bruxism and this has not been helpful because there is a ridge on the mouth guard that is  "irritating also.  This occurs mostly when sleeping on that side of the mouth and with chewing that it gets irritated.  He feels that his eye teeth are sharp and this is the cause along with his cheeks getting fatter.  He feels that this never really heals and has been occurring over the last 6 months on and off.  Currently the left side is improved and the right side is the problem now.    Patient also has a small lesion on his left cheek that is raised and will itch and scab then heal and cycles with this.  He wants this looked at today also.    Review of Systems   CONSTITUTIONAL: NEGATIVE for fever, chills, change in weight  INTEGUMENTARY/SKIN: POSITIVE for irritation in the right lower lip in the mucosa that is painful to touch anything to it.  Patient also has a lesion that is raised on his left cheek that itches and then it scabs and continues in this cycle  RESP: NEGATIVE for significant cough or SOB  CV: NEGATIVE for chest pain, palpitations or peripheral edema  PSYCHIATRIC: NEGATIVE for changes in mood or affect  ROS otherwise negative      Objective    /76 (BP Location: Right arm, Patient Position: Chair, Cuff Size: Adult Large)   Pulse 74   Temp 97.6  F (36.4  C) (Tympanic)   Resp 14   Ht 1.676 m (5' 6\")   Wt 81.2 kg (179 lb)   SpO2 96%   BMI 28.89 kg/m    Body mass index is 28.89 kg/m .  Physical Exam   GENERAL: healthy, alert and no distress  SKIN: mucosal irritation in the right lower lip laterally with no blistering or open ulceration, left cheek has small seborrheic keratosis that is slightly scabbed, flesh colored and scaly      "

## 2022-03-08 NOTE — PATIENT INSTRUCTIONS
1.  Lab today.  Dr. Gautam will be in touch with results.  2.  Make appointment with dermatology if lesion on your left cheek does not resolve.  3.  Make appointment with dentist to see if they can help with tooth sharpness to reduce irritation on the lip.

## 2022-03-09 LAB — HCV AB SERPL QL IA: NONREACTIVE

## 2022-04-25 ENCOUNTER — TRANSFERRED RECORDS (OUTPATIENT)
Dept: HEALTH INFORMATION MANAGEMENT | Facility: CLINIC | Age: 72
End: 2022-04-25
Payer: COMMERCIAL

## 2022-04-28 NOTE — CONFIDENTIAL NOTE
Please abstract the following data from this visit with this patient into the appropriate field in Epic:    Tests that can be patient reported without a hard copy:    Eye exam with ophthalmology on this date: 04/25/2022    Other Tests found in the patient's chart through Chart Review/Care Everywhere:    Eye exam with ophthalmology on this date: 04/25/2022    Note to Abstraction: If this section is blank, no results were found via Chart Review/Care Everywhere.

## 2022-05-31 ENCOUNTER — OFFICE VISIT (OUTPATIENT)
Dept: DERMATOLOGY | Facility: CLINIC | Age: 72
End: 2022-05-31
Attending: NURSE PRACTITIONER
Payer: COMMERCIAL

## 2022-05-31 VITALS — HEART RATE: 69 BPM | SYSTOLIC BLOOD PRESSURE: 123 MMHG | DIASTOLIC BLOOD PRESSURE: 83 MMHG | OXYGEN SATURATION: 98 %

## 2022-05-31 DIAGNOSIS — L82.1 SEBORRHEIC KERATOSIS: ICD-10-CM

## 2022-05-31 DIAGNOSIS — D23.9 DERMAL NEVUS: ICD-10-CM

## 2022-05-31 DIAGNOSIS — L82.0 INFLAMED SEBORRHEIC KERATOSIS: ICD-10-CM

## 2022-05-31 DIAGNOSIS — L81.4 LENTIGO: Primary | ICD-10-CM

## 2022-05-31 PROCEDURE — 99215 OFFICE O/P EST HI 40 MIN: CPT | Mod: 25 | Performed by: DERMATOLOGY

## 2022-05-31 PROCEDURE — 17110 DESTRUCTION B9 LES UP TO 14: CPT | Performed by: DERMATOLOGY

## 2022-05-31 NOTE — PROGRESS NOTES
Jaime Saucedo , a 72 year old year old male patient, I was asked to see by CHANTE Carey for spot on cheek.  Patient states this has been present for a while.  Patient reports the following symptoms:  itching .  Patient reports the following previous treatments none.  Patient reports the following modifying factors none.  Associated symptoms: none.  Patient has no other skin complaints today.  Remainder of the HPI, Meds, PMH, Allergies, FH, and SH was reviewed in chart.      Past Medical History:   Diagnosis Date     Hypertension      Obstructive sleep apnea syndrome 6/1/2011       Past Surgical History:   Procedure Laterality Date     ARTHROSCOPY KNEE RT/LT Bilateral 2006    partial meniscectomy     PHACOEMULSIFICATION WITH STANDARD INTRAOCULAR LENS IMPLANT Left 3/6/2019    Procedure: Cataract Removal with Implant;  Surgeon: Johan Anthony MD;  Location: [facility]     PHACOEMULSIFICATION WITH STANDARD INTRAOCULAR LENS IMPLANT Right 4/28/2021    Procedure: Cataract extraction with implant.;  Surgeon: Johan Anthony MD;  Location: [facility]        Family History   Problem Relation Age of Onset     Hypertension Mother      Hypertension Father        Social History     Socioeconomic History     Marital status:      Spouse name: Not on file     Number of children: Not on file     Years of education: Not on file     Highest education level: Not on file   Occupational History     Not on file   Tobacco Use     Smoking status: Never Smoker     Smokeless tobacco: Never Used   Vaping Use     Vaping Use: Never used   Substance and Sexual Activity     Alcohol use: Yes     Comment: rare     Drug use: No     Sexual activity: Yes     Partners: Female   Other Topics Concern     Parent/sibling w/ CABG, MI or angioplasty before 65F 55M? Not Asked   Social History Narrative     Not on file     Social Determinants of Health     Financial Resource Strain: Not on file   Food Insecurity: Not on file   Transportation  Needs: Not on file   Physical Activity: Not on file   Stress: Not on file   Social Connections: Not on file   Intimate Partner Violence: Not on file   Housing Stability: Not on file       Outpatient Encounter Medications as of 5/31/2022   Medication Sig Dispense Refill     losartan (COZAAR) 25 MG tablet Take 0.5 tablets (12.5 mg) by mouth daily Take 1/2 tablet daily 45 tablet 4     simvastatin (ZOCOR) 40 MG tablet Take 1 tablet (40 mg) by mouth At Bedtime 90 tablet 4     No facility-administered encounter medications on file as of 5/31/2022.             Review Of Systems  Skin: As above  Eyes: negative  Ears/Nose/Throat: negative  Respiratory: No shortness of breath, dyspnea on exertion, cough, or hemoptysis  Cardiovascular: negative  Gastrointestinal: negative  Genitourinary: negative  Musculoskeletal: negative  Neurologic: negative  Psychiatric: negative  Hematologic/Lymphatic/Immunologic: negative  Endocrine: negative      O:   NAD, WDWN, Alert & Oriented, Mood & Affect wnl, Vitals stable   Here today alone   /83   Pulse 69   SpO2 98%    General appearance pavel ii   Vitals stable   Alert, oriented and in no acute distress      Following lymph nodes palpated: Occipital, Cervical, Supraclavicular no lad  L cheek inflamed seborrheic keratosis   Stuck on papules and brown macules on face   Flesh colored papules on neck       Eyes: Conjunctivae/lids:Normal     ENT: Lips, buccal mucosa, tongue: normal    MSK:Normal    Cardiovascular: peripheral edema none    Pulm: Breathing Normal    Lymph Nodes: No Head and Neck Lymphadenopathy     Neuro/Psych: Orientation:Normal; Mood/Affect:Normal      A/P:  1. Seborrheic keratosis, lentigo, dermal nevus  2. L cheek inflamed seborrheic keratosis   Pathophysiology discussed with pateint and information provided   LN2:  Treated with LN2 for 5s for 1-2 cycles. Warned risks of blistering, pain, pigment change, scarring, and incomplete resolution.  Advised patient to return if  lesions do not completely resolve.  Wound care sheet given.  It was a pleasure speaking to Jaime Saucedo today.  Previous clinic  notes and pertinent laboratory tests were reviewed prior to Jaime Saucedo's visit.  BENIGN LESIONS DISCUSSED WITH PATIENT:  I discussed the specifics of tumor, prognosis, and genetics of benign lesions.  I explained that treatment of these lesions would be purely cosmetic and not medically neccessary.  I discussed with patient different removal options including excision, cautery and /or laser.    Patient encouraged to perform monthly skin exams.  UV precautions reviewed with patient.  Return to clinic 12 months

## 2022-05-31 NOTE — LETTER
5/31/2022         RE: Jaime Saucedo  [address] [facility] [address] [address]        Dear Colleague,    Thank you for referring your patient, Jaime Saucedo, to the [facility]. Please see a copy of my visit note below.    Jaime Saucedo , a 72 year old year old male patient, I was asked to see by CHANTE Carey for spot on cheek.  Patient states this has been present for a while.  Patient reports the following symptoms:  itching .  Patient reports the following previous treatments none.  Patient reports the following modifying factors none.  Associated symptoms: none.  Patient has no other skin complaints today.  Remainder of the HPI, Meds, PMH, Allergies, FH, and SH was reviewed in chart.      Past Medical History:   Diagnosis Date     Hypertension      Obstructive sleep apnea syndrome 6/1/2011       Past Surgical History:   Procedure Laterality Date     ARTHROSCOPY KNEE RT/LT Bilateral 2006    partial meniscectomy     PHACOEMULSIFICATION WITH STANDARD INTRAOCULAR LENS IMPLANT Left 3/6/2019    Procedure: Cataract Removal with Implant;  Surgeon: Johan Anthony MD;  Location: [facility]     PHACOEMULSIFICATION WITH STANDARD INTRAOCULAR LENS IMPLANT Right 4/28/2021    Procedure: Cataract extraction with implant.;  Surgeon: Johan Anthony MD;  Location: [facility]        Family History   Problem Relation Age of Onset     Hypertension Mother      Hypertension Father        Social History     Socioeconomic History     Marital status:      Spouse name: Not on file     Number of children: Not on file     Years of education: Not on file     Highest education level: Not on file   Occupational History     Not on file   Tobacco Use     Smoking status: Never Smoker     Smokeless tobacco: Never Used   Vaping Use     Vaping Use: Never used   Substance and Sexual Activity     Alcohol use: Yes     Comment: rare     Drug use: No     Sexual activity: Yes     Partners: Female   Other  Topics Concern     Parent/sibling w/ CABG, MI or angioplasty before 65F 55M? Not Asked   Social History Narrative     Not on file     Social Determinants of Health     Financial Resource Strain: Not on file   Food Insecurity: Not on file   Transportation Needs: Not on file   Physical Activity: Not on file   Stress: Not on file   Social Connections: Not on file   Intimate Partner Violence: Not on file   Housing Stability: Not on file       Outpatient Encounter Medications as of 5/31/2022   Medication Sig Dispense Refill     losartan (COZAAR) 25 MG tablet Take 0.5 tablets (12.5 mg) by mouth daily Take 1/2 tablet daily 45 tablet 4     simvastatin (ZOCOR) 40 MG tablet Take 1 tablet (40 mg) by mouth At Bedtime 90 tablet 4     No facility-administered encounter medications on file as of 5/31/2022.             Review Of Systems  Skin: As above  Eyes: negative  Ears/Nose/Throat: negative  Respiratory: No shortness of breath, dyspnea on exertion, cough, or hemoptysis  Cardiovascular: negative  Gastrointestinal: negative  Genitourinary: negative  Musculoskeletal: negative  Neurologic: negative  Psychiatric: negative  Hematologic/Lymphatic/Immunologic: negative  Endocrine: negative      O:   NAD, WDWN, Alert & Oriented, Mood & Affect wnl, Vitals stable   Here today alone   /83   Pulse 69   SpO2 98%    General appearance pavel ii   Vitals stable   Alert, oriented and in no acute distress      Following lymph nodes palpated: Occipital, Cervical, Supraclavicular no lad  L cheek inflamed seborrheic keratosis   Stuck on papules and brown macules on face   Flesh colored papules on neck       Eyes: Conjunctivae/lids:Normal     ENT: Lips, buccal mucosa, tongue: normal    MSK:Normal    Cardiovascular: peripheral edema none    Pulm: Breathing Normal    Lymph Nodes: No Head and Neck Lymphadenopathy     Neuro/Psych: Orientation:Normal; Mood/Affect:Normal      A/P:  1. Seborrheic keratosis, lentigo, dermal nevus  2. L cheek inflamed  seborrheic keratosis   Pathophysiology discussed with pateint and information provided   LN2:  Treated with LN2 for 5s for 1-2 cycles. Warned risks of blistering, pain, pigment change, scarring, and incomplete resolution.  Advised patient to return if lesions do not completely resolve.  Wound care sheet given.  It was a pleasure speaking to Jaime Saucedo today.  Previous clinic  notes and pertinent laboratory tests were reviewed prior to Jaime Saucedo's visit.  BENIGN LESIONS DISCUSSED WITH PATIENT:  I discussed the specifics of tumor, prognosis, and genetics of benign lesions.  I explained that treatment of these lesions would be purely cosmetic and not medically neccessary.  I discussed with patient different removal options including excision, cautery and /or laser.    Patient encouraged to perform monthly skin exams.  UV precautions reviewed with patient.  Return to clinic 12 months        Again, thank you for allowing me to participate in the care of your patient.        Sincerely,        Johan Israel MD

## 2022-08-23 ENCOUNTER — TELEPHONE (OUTPATIENT)
Dept: FAMILY MEDICINE | Facility: CLINIC | Age: 72
End: 2022-08-23

## 2022-08-23 NOTE — TELEPHONE ENCOUNTER
Reason for Call:  Same Day Appointment, Requested Provider:  Adalberto Gautam    PCP: Adalberto Gautam    Reason for visit: balance issues/weakness in legs. Balance issue is getting worse    Duration of symptoms: 2 months    Have you been treated for this in the past? No    Additional comments:     Can we leave a detailed message on this number? YES    Phone number patient can be reached at: Home number on file [phone] (home)    Best Time:     Call taken on 8/23/2022 at 12:45 PM by Samantha Napier

## 2022-08-23 NOTE — TELEPHONE ENCOUNTER
Writer spoke with patient regarding balance issues and leg weakness.    Patient stated he has had trouble with bilateral leg weakness and instability with balance for the last 3 years, but feels worse over the last 2 months.    Also reports some double vision that started 4 years ago after cataract surgery.      Patient lives home with his wife, is up independently and walks outside in the community with no assistance.      Appointment scheduled for 8/24 for assessment.    Malini COLE

## 2022-08-24 ENCOUNTER — OFFICE VISIT (OUTPATIENT)
Dept: FAMILY MEDICINE | Facility: CLINIC | Age: 72
End: 2022-08-24
Payer: COMMERCIAL

## 2022-08-24 ENCOUNTER — TELEPHONE (OUTPATIENT)
Dept: FAMILY MEDICINE | Facility: CLINIC | Age: 72
End: 2022-08-24

## 2022-08-24 VITALS
RESPIRATION RATE: 16 BRPM | HEART RATE: 82 BPM | BODY MASS INDEX: 27.16 KG/M2 | HEIGHT: 66 IN | TEMPERATURE: 98.3 F | DIASTOLIC BLOOD PRESSURE: 72 MMHG | SYSTOLIC BLOOD PRESSURE: 112 MMHG | OXYGEN SATURATION: 99 % | WEIGHT: 169 LBS

## 2022-08-24 DIAGNOSIS — M54.42 MIDLINE LOW BACK PAIN WITH BILATERAL SCIATICA, UNSPECIFIED CHRONICITY: ICD-10-CM

## 2022-08-24 DIAGNOSIS — R29.898 WEAKNESS OF BOTH LOWER EXTREMITIES: Primary | ICD-10-CM

## 2022-08-24 DIAGNOSIS — R25.1 TREMOR: ICD-10-CM

## 2022-08-24 DIAGNOSIS — M54.41 MIDLINE LOW BACK PAIN WITH BILATERAL SCIATICA, UNSPECIFIED CHRONICITY: ICD-10-CM

## 2022-08-24 DIAGNOSIS — G25.81 RESTLESS LEGS SYNDROME (RLS): ICD-10-CM

## 2022-08-24 LAB
ANION GAP SERPL CALCULATED.3IONS-SCNC: 5 MMOL/L (ref 3–14)
BUN SERPL-MCNC: 22 MG/DL (ref 7–30)
CALCIUM SERPL-MCNC: 9.1 MG/DL (ref 8.5–10.1)
CHLORIDE BLD-SCNC: 109 MMOL/L (ref 94–109)
CO2 SERPL-SCNC: 25 MMOL/L (ref 20–32)
CREAT SERPL-MCNC: 1.04 MG/DL (ref 0.66–1.25)
FERRITIN SERPL-MCNC: 256 NG/ML (ref 26–388)
GFR SERPL CREATININE-BSD FRML MDRD: 77 ML/MIN/1.73M2
GLUCOSE BLD-MCNC: 106 MG/DL (ref 70–99)
MAGNESIUM SERPL-MCNC: 1.9 MG/DL (ref 1.6–2.3)
POTASSIUM BLD-SCNC: 4.2 MMOL/L (ref 3.4–5.3)
SODIUM SERPL-SCNC: 139 MMOL/L (ref 133–144)

## 2022-08-24 PROCEDURE — 83735 ASSAY OF MAGNESIUM: CPT | Performed by: NURSE PRACTITIONER

## 2022-08-24 PROCEDURE — 36415 COLL VENOUS BLD VENIPUNCTURE: CPT | Performed by: NURSE PRACTITIONER

## 2022-08-24 PROCEDURE — 80048 BASIC METABOLIC PNL TOTAL CA: CPT | Performed by: NURSE PRACTITIONER

## 2022-08-24 PROCEDURE — 99214 OFFICE O/P EST MOD 30 MIN: CPT | Performed by: NURSE PRACTITIONER

## 2022-08-24 PROCEDURE — 82728 ASSAY OF FERRITIN: CPT | Performed by: NURSE PRACTITIONER

## 2022-08-24 ASSESSMENT — PAIN SCALES - GENERAL: PAINLEVEL: NO PAIN (0)

## 2022-08-24 NOTE — PROGRESS NOTES
"  Assessment & Plan     Weakness of both lower extremities    - Adult Neurology  Referral; Future  - Physical Therapy Referral; Future    Tremor    - Adult Neurology  Referral; Future    Restless legs syndrome (RLS)    - Magnesium; Future  - Basic metabolic panel  (Ca, Cl, CO2, Creat, Gluc, K, Na, BUN); Future  - Ferritin; Future  - Magnesium  - Basic metabolic panel  (Ca, Cl, CO2, Creat, Gluc, K, Na, BUN)  - Ferritin    Midline low back pain with bilateral sciatica, unspecified chronicity    - Physical Therapy Referral; Future  - XR Lumbar Spine 2/3 Views; Future    Will obtain x ray of back, send to PT and have him consult with Neurology again for his concerns.  Labs pending and I suggested he touch base with his PCP as well.         BMI:   Estimated body mass index is 27.28 kg/m  as calculated from the following:    Height as of this encounter: 1.676 m (5' 6\").    Weight as of this encounter: 76.7 kg (169 lb).       See Patient Instructions  Patient Instructions   Will be notified of pending lab and x ray results.  Follow up with eye doctor.  Referral placed for consult with neurology.      Our Clinic hours are:  Mondays    7:20 am - 7 pm  Tues -  Fri  7:20 am - 5 pm    Clinic Phone: [phone]    The clinic lab opens at 7:30 am Mon - Fri and appointments are required.    [facility]  Ph. [phone]  Monday  8 am - 7pm  Tues - Fri 8 am - 5:30 pm           Return in about 4 weeks (around 9/21/2022) for or sooner if symptoms persist or worsen.    EBER Lucas CNP  [facility]    Migel Sandoval is a 72 year old, presenting for the following health issues:  Balance/ Vestibular      History of Present Illness       Back Pain:  He presents for follow up of back pain. Patient's back pain is a recurring problem.  Location of back pain:  Left lower back  Description of back pain: dull ache  Back pain spreads: right thigh, left thigh, right " "foot and left foot    Since patient first noticed back pain, pain is: gradually worsening  Does back pain interfere with his job:  No      He eats 0-1 servings of fruits and vegetables daily.He consumes 0 sweetened beverage(s) daily.He exercises with enough effort to increase his heart rate 60 or more minutes per day.  He exercises with enough effort to increase his heart rate 6 days per week.   He is taking medications regularly.         Patient not known to me, comes in with a list of concerns. He reports having weakness in his legs, a tremor, some balance concerns, vision concerns, RLS concerns and an odd report of central nervous system \"malfunction\" in the 1980's and 1990's. He reports he has seen neurology in the past but no diagnosis made. I see in his EMR he had a negative brain MRI in 3/26/21.  He has some low back pain. Most of these concerns have been present for months to years.  Reports feeling generally well at present. He typically sees Dr. MISHA Gautam.  Current Outpatient Medications   Medication     losartan (COZAAR) 25 MG tablet     simvastatin (ZOCOR) 40 MG tablet     No current facility-administered medications for this visit.     Past Medical History:   Diagnosis Date     Hypertension      Obstructive sleep apnea syndrome 6/1/2011         Review of Systems   Constitutional, HEENT, cardiovascular, pulmonary, gi and gu systems are negative, except as otherwise noted.      Objective    /72   Pulse 82   Temp 98.3  F (36.8  C)   Resp 16   Ht 1.676 m (5' 6\")   Wt 76.7 kg (169 lb)   SpO2 99%   BMI 27.28 kg/m    Body mass index is 27.28 kg/m .  Physical Exam   GENERAL: healthy, alert and no distress  NECK: no adenopathy, no asymmetry  RESP: lungs clear  CV: regular rate and rhythm  ABDOMEN: soft  MS: no gross musculoskeletal defects noted, slight tremor noted in right lower leg, purposeful in hands  PSYCH: quiet, flat affect      No results found for this or any previous visit (from the " past 24 hour(s)).                .  ..

## 2022-08-24 NOTE — PATIENT INSTRUCTIONS
Will be notified of pending lab and x ray results.  Follow up with eye doctor.  Referral placed for consult with neurology.      Our Clinic hours are:  Mondays    7:20 am - 7 pm  Tues -  Fri  7:20 am - 5 pm    Clinic Phone: [phone]    The clinic lab opens at 7:30 am Mon - Fri and appointments are required.    [facility]. [phone]  Monday  8 am - 7pm  Tues - Fri 8 am - 5:30 pm

## 2022-08-24 NOTE — TELEPHONE ENCOUNTER
Patient returned call. Result note message relayed to patient from today regarding normal results.   Aida COE RN

## 2022-08-24 NOTE — LETTER
August 24, 2022      Jaime Saucedo  [address] [facility] [facility] [address]        Dear ,    We are writing to inform you of your test results.    Your test results fall within the expected range(s) or remain unchanged from previous results.  Please continue with current treatment plan.    Resulted Orders   Magnesium   Result Value Ref Range    Magnesium 1.9 1.6 - 2.3 mg/dL   Basic metabolic panel  (Ca, Cl, CO2, Creat, Gluc, K, Na, BUN)   Result Value Ref Range    Sodium 139 133 - 144 mmol/L    Potassium 4.2 3.4 - 5.3 mmol/L    Chloride 109 94 - 109 mmol/L    Carbon Dioxide (CO2) 25 20 - 32 mmol/L    Anion Gap 5 3 - 14 mmol/L    Urea Nitrogen 22 7 - 30 mg/dL    Creatinine 1.04 0.66 - 1.25 mg/dL    Calcium 9.1 8.5 - 10.1 mg/dL    Glucose 106 (H) 70 - 99 mg/dL    GFR Estimate 77 >60 mL/min/1.73m2      Comment:      Effective December 21, 2021 eGFRcr in adults is calculated using the 2021 CKD-EPI creatinine equation which includes age and gender (Aundrea et al., NEJM, DOI: 10.1056/PALTtz5712006)   Ferritin   Result Value Ref Range    Ferritin 256 26 - 388 ng/mL                       These test results are considered normal.   If you have further questions regarding these tests, please use the [employer] system to send a message to our nurse triage staff.   You can also schedule a follow up appointment with me.     If you have any questions or concerns, please call the clinic at the number listed above.       Sincerely,      EBER Lucas CNP

## 2022-09-07 ENCOUNTER — TELEPHONE (OUTPATIENT)
Dept: FAMILY MEDICINE | Facility: CLINIC | Age: 72
End: 2022-09-07

## 2022-09-07 DIAGNOSIS — G25.81 RESTLESS LEGS SYNDROME (RLS): Primary | ICD-10-CM

## 2022-09-07 RX ORDER — PRAMIPEXOLE DIHYDROCHLORIDE 0.12 MG/1
0.12 TABLET ORAL 3 TIMES DAILY
Qty: 30 TABLET | Refills: 1 | Status: SHIPPED | OUTPATIENT
Start: 2022-09-07 | End: 2022-09-09

## 2022-09-07 NOTE — TELEPHONE ENCOUNTER
Reason for call:  Patient reporting a symptom    Symptom or request: Pt saw DELMAR Griffith 8/24 for restless legs and cannot see Neuro for 2 mo.  Pt wants   to know if there is anything that DELMAR Griffith can prescribe for him, until he can see Neuro?  Please call patient and advise.    [facility]    Duration (how long have symptoms been present): ongoing    Have you been treated for this before? Yes    Additional comments:     Phone Number patient can be reached at:  Home number on file [phone] (home)    Best Time:  any    Can we leave a detailed message on this number:  YES    Call taken on 9/7/2022 at 3:47 PM by Mamie Jimenez

## 2022-09-07 NOTE — TELEPHONE ENCOUNTER
Writer called patient and informed him of the medication.    Kartik COLE [facility]

## 2022-09-09 ENCOUNTER — TELEPHONE (OUTPATIENT)
Dept: FAMILY MEDICINE | Facility: CLINIC | Age: 72
End: 2022-09-09

## 2022-09-09 DIAGNOSIS — G25.81 RESTLESS LEGS SYNDROME (RLS): ICD-10-CM

## 2022-09-09 RX ORDER — PRAMIPEXOLE DIHYDROCHLORIDE 0.12 MG/1
0.12 TABLET ORAL AT BEDTIME
Qty: 30 TABLET | Refills: 1 | COMMUNITY
Start: 2022-09-09 | End: 2022-09-28

## 2022-09-09 NOTE — TELEPHONE ENCOUNTER
Pharmacy came to RN for in person huddle:  Pramipexole is written for PRN TID  Pharmacist suggests dosing of 1 tab nightly for RLS.  Pharmacy did call patient and instruct him that the dosing should be one tab nightly.  Pharmacy wanted to run this past [facility] for verification.    Routed to [place].    Kartik COLE [facility]

## 2022-09-27 DIAGNOSIS — G25.81 RESTLESS LEGS SYNDROME (RLS): ICD-10-CM

## 2022-09-27 NOTE — TELEPHONE ENCOUNTER
Pending Prescriptions:                       Disp   Refills    pramipexole (MIRAPEX) 0.125 MG tablet     30 tab*1            Sig: Take 1 tablet (0.125 mg) by mouth At Bedtime    Routing refill request to provider for review/approval because:  Labs not current:    Lab Results   Component Value Date    WBC 7.3 04/08/2021     Lab Results   Component Value Date    RBC 4.93 04/08/2021     Lab Results   Component Value Date    HGB 14.5 04/08/2021     Lab Results   Component Value Date    HCT 42.1 04/08/2021     No components found for: MCT  Lab Results   Component Value Date    MCV 85 04/08/2021     Lab Results   Component Value Date    MCH 29.4 04/08/2021     Lab Results   Component Value Date    MCHC 34.4 04/08/2021     Lab Results   Component Value Date    RDW 12.0 04/08/2021     Lab Results   Component Value Date     04/08/2021         Jay Holly RN

## 2022-09-27 NOTE — TELEPHONE ENCOUNTER
Medication Question or Refill    Contacts       Type Contact Phone/Fax    09/27/2022 02:40 PM CDT Phone (Incoming) Jaime Saucedo (Self) [phone] (H)          What medication are you calling about (include dose and sig)?: pramipexole    Sig - Route: Take 1 tablet (0.125 mg) by mouth At Bedtime - Oral    Patient confirmed dose, he still takes this and it is listed as historical, pharmacy cannot fill, please advise as pt is out, requesting another provider address today in Dr Gautam's absence.  Controlled Substance Agreement on file:   CSA -- Patient Level:    CSA: None found at the patient level.       Who prescribed the medication?: Gayla Griffith    Do you need a refill? Yes:     When did you use the medication last? Current    Preferred Pharmacy:   [place], [place] - [address]  [address]  [address] [facility] [address]  Phone: [phone] Fax: [phone] Alternate Fax: [phone]      Okay to leave a detailed message?: Yes at Home number on file [phone] (home) no need to call unless there are questions.

## 2022-09-28 RX ORDER — PRAMIPEXOLE DIHYDROCHLORIDE 0.12 MG/1
0.12 TABLET ORAL AT BEDTIME
Qty: 90 TABLET | Refills: 1 | Status: SHIPPED | OUTPATIENT
Start: 2022-09-28 | End: 2023-10-18

## 2022-10-07 ENCOUNTER — TRANSFERRED RECORDS (OUTPATIENT)
Dept: FAMILY MEDICINE | Facility: CLINIC | Age: 72
End: 2022-10-07

## 2023-02-08 ENCOUNTER — TELEPHONE (OUTPATIENT)
Dept: FAMILY MEDICINE | Facility: CLINIC | Age: 73
End: 2023-02-08
Payer: COMMERCIAL

## 2023-02-08 NOTE — TELEPHONE ENCOUNTER
New Medication Request    Contacts       Type Contact Phone/Fax    02/08/2023 09:52 AM CST Phone (Incoming) Jaime Saucedo (Self) [phone] (H)          What medication are you requesting?: Viagra    Have you taken this medication before?: Yes: Many decades ago. Seemed to work really well. Wants to try again.    Controlled Substance Agreement on file:   CSA -- Patient Level:    CSA: None found at the patient level.         Preferred Pharmacy:  [place], [place] - [address]  [address] [facility] [address]  [address] [facility] [address]  Phone: [phone] Fax: [phone] Alternate Fax: [phone]      Okay to leave a detailed message?: Yes at Cell number on file:    No relevant phone numbers on file.       .Jeannine Gray PSC

## 2023-02-09 ENCOUNTER — VIRTUAL VISIT (OUTPATIENT)
Dept: FAMILY MEDICINE | Facility: CLINIC | Age: 73
End: 2023-02-09
Payer: COMMERCIAL

## 2023-02-09 DIAGNOSIS — N52.9 ERECTILE DYSFUNCTION, UNSPECIFIED ERECTILE DYSFUNCTION TYPE: Primary | ICD-10-CM

## 2023-02-09 PROCEDURE — 99441 PR PHYSICIAN TELEPHONE EVALUATION 5-10 MIN: CPT | Mod: 95 | Performed by: FAMILY MEDICINE

## 2023-02-09 RX ORDER — SILDENAFIL 25 MG/1
25-50 TABLET, FILM COATED ORAL DAILY PRN
Qty: 10 TABLET | Refills: 11 | Status: SHIPPED | OUTPATIENT
Start: 2023-02-09 | End: 2023-02-15

## 2023-02-09 NOTE — PROGRESS NOTES
"Jaime is a 72 year old who is being evaluated via a billable telephone visit.      What phone number would you like to be contacted at? [phone]  How would you like to obtain your AVS? Mail a copy    Distant Location (provider location):  On-site    Assessment & Plan     Erectile dysfunction, unspecified erectile dysfunction type  Years ago had tried Viagra.  Did not have any untoward side effects. Discussed use and side effects.  Cautioned about vision loss if he has any vision loss even transient he should not take another dose and report that to me immediately.  Also cautioned to not mix with nitrates although he does not have a prescription for nitroglycerin.  - sildenafil (VIAGRA) 25 MG tablet; Take 1-2 tablets (25-50 mg) by mouth daily as needed             BMI:   Estimated body mass index is 27.28 kg/m  as calculated from the following:    Height as of 8/24/22: 1.676 m (5' 6\").    Weight as of 8/24/22: 76.7 kg (169 lb).           No follow-ups on file.    Adalberto Gautam MD  [facility]   Jaime is a 72 year old, presenting for the following health issues:  Medication Request      HPI     Patient pesents via phone visit to discuss possibly starting new medication - Viagra.      Review of Systems   Constitutional, neuro, ENT, endocrine, pulmonary, cardiac, gastrointestinal, genitourinary, musculoskeletal, integument and psychiatric systems are negative, except as otherwise noted.       Objective           Vitals:  No vitals were obtained today due to virtual visit.    Physical Exam   healthy, alert and no distress  PSYCH: Alert and oriented times 3; coherent speech, normal   rate and volume, able to articulate logical thoughts, able   to abstract reason, no tangential thoughts, no hallucinations   or delusions  His affect is normal  RESP: No cough, no audible wheezing, able to talk in full sentences  Remainder of exam unable to be completed due to telephone " visits                Phone call duration: 7 minutes

## 2023-02-13 ENCOUNTER — TELEPHONE (OUTPATIENT)
Dept: FAMILY MEDICINE | Facility: CLINIC | Age: 73
End: 2023-02-13
Payer: COMMERCIAL

## 2023-02-13 DIAGNOSIS — N52.9 ERECTILE DYSFUNCTION, UNSPECIFIED ERECTILE DYSFUNCTION TYPE: Primary | ICD-10-CM

## 2023-02-13 NOTE — TELEPHONE ENCOUNTER
Reason for call:    Symptom or request:     Patient calling for a generic viagra. The current rx costs over $500.    Pharmacy: [facility]         Best Time:  Yes     Can we leave a detailed message on this number?  YES     Zeynep BHANDARI  Station

## 2023-02-13 NOTE — TELEPHONE ENCOUNTER
Pharmacy states it is normally a lot cheaper if send in the Sildenafil (Viagra)  20 mg instead. Thank you!    Gayla Mccray RN

## 2023-02-15 RX ORDER — SILDENAFIL CITRATE 20 MG/1
TABLET ORAL
Qty: 10 TABLET | Refills: 11 | Status: SHIPPED | OUTPATIENT
Start: 2023-02-15

## 2023-03-09 ENCOUNTER — VIRTUAL VISIT (OUTPATIENT)
Dept: FAMILY MEDICINE | Facility: CLINIC | Age: 73
End: 2023-03-09
Payer: COMMERCIAL

## 2023-03-09 DIAGNOSIS — J01.90 ACUTE SINUSITIS WITH SYMPTOMS > 10 DAYS: Primary | ICD-10-CM

## 2023-03-09 PROBLEM — Z13.6 CARDIOVASCULAR SCREENING; LDL GOAL LESS THAN 160: Status: RESOLVED | Noted: 2017-04-18 | Resolved: 2023-03-09

## 2023-03-09 PROBLEM — U07.1 PNEUMONIA DUE TO 2019 NOVEL CORONAVIRUS: Status: RESOLVED | Noted: 2021-03-26 | Resolved: 2023-03-09

## 2023-03-09 PROBLEM — J12.82 PNEUMONIA DUE TO 2019 NOVEL CORONAVIRUS: Status: RESOLVED | Noted: 2021-03-26 | Resolved: 2023-03-09

## 2023-03-09 PROCEDURE — 99441 PR PHYSICIAN TELEPHONE EVALUATION 5-10 MIN: CPT | Mod: 95 | Performed by: PHYSICIAN ASSISTANT

## 2023-03-09 NOTE — PROGRESS NOTES
"Jaime is a 72 year old who is being evaluated via a billable telephone visit.      What phone number would you like to be contacted at? [phone]  How would you like to obtain your AVS? Mail a copy  Distant Location (provider location):  On-site    Assessment & Plan   Acute sinusitis with symptoms > 10 days  Symptoms ongoing for 4 weeks. History and exam concerning for acute sinusitis. No evidence of other URI infection. Will treat with Augmentin. Continue using OTC allergy and decongestant medications. RETURN TO CLINIC in 2 weeks if symptoms not improved  - amoxicillin-clavulanate (AUGMENTIN) 875-125 MG tablet; Take 1 tablet by mouth 2 times daily     BMI:   Estimated body mass index is 27.28 kg/m  as calculated from the following:    Height as of 8/24/22: 1.676 m (5' 6\").    Weight as of 8/24/22: 76.7 kg (169 lb).     Return in about 2 weeks (around 3/23/2023), or if symptoms worsen or fail to improve, for In-Clinic Visit.    JESSICA Frausto [facility]    Subjective   Jaime is a 72 year old, presenting for the following health issues:  Sinus Problem    HPI   Acute Illness  Acute illness concerns: Congestion,   Onset/Duration: 4 weeks   Symptoms:  Fever: No  Chills/Sweats: No  Headache (location?): No  Sinus Pressure: No  Conjunctivitis:  YES  Ear Pain: no  Rhinorrhea: YES  Congestion: YES  Sore Throat: No  Cough: YES - From some post nasal drainage   Wheeze: No  Decreased Appetite: No  Nausea: No  Vomiting: No  Diarrhea: No  Dysuria/Freq.: No  Dysuria or Hematuria: No  Fatigue/Achiness: No  Sick/Strep Exposure: YES- family ( says that his son had something similar a few months ago. Has not been around him in the last weeks)  Therapies tried and outcome: Using a Sheri PotApril,     Review of Systems   See HPI       Objective       Vitals:  No vitals were obtained today due to virtual visit.    Physical Exam   healthy, alert and no distress  PSYCH: Alert and oriented " times 3; coherent speech, normal   rate and volume, able to articulate logical thoughts, able   to abstract reason, no tangential thoughts, no hallucinations   or delusions  His affect is normal  RESP: No cough, no audible wheezing, able to talk in full sentences  Remainder of exam unable to be completed due to telephone visits      Phone call duration: 6 minutes

## 2023-03-13 DIAGNOSIS — I10 HYPERTENSION GOAL BP (BLOOD PRESSURE) < 140/90: ICD-10-CM

## 2023-03-14 RX ORDER — LOSARTAN POTASSIUM 25 MG/1
TABLET ORAL
Qty: 45 TABLET | Refills: 1 | Status: SHIPPED | OUTPATIENT
Start: 2023-03-14 | End: 2023-08-31

## 2023-03-16 ENCOUNTER — TELEPHONE (OUTPATIENT)
Dept: FAMILY MEDICINE | Facility: CLINIC | Age: 73
End: 2023-03-16
Payer: COMMERCIAL

## 2023-03-16 DIAGNOSIS — J01.90 ACUTE SINUSITIS WITH SYMPTOMS > 10 DAYS: Primary | ICD-10-CM

## 2023-03-16 NOTE — TELEPHONE ENCOUNTER
Per Willy's note recommend patient be re seen if he is not improving after treatment.    RETURN TO CLINIC in 2 weeks if symptoms not improved    Dari Chew CNP

## 2023-03-16 NOTE — TELEPHONE ENCOUNTER
S-(situation): I talked with Jaime.  He finished antibiotics yesterday.  Is better, but not 100%.  No temp.  Ears and nose plugged.  Some fatigue.  No shortness of breath or wheezing.  Asking to extend antibiotic.  Wakes up at night    B-(background): had virtual visit 3/9/23 for symptoms of sinus infection.  Treated with Augmentin.  Has not tried OTC allergy or decongestant medications    A-(assessment): finished antibiotic for sinus infection and is not better    R-(recommendations): advised to try decongestant or allergy med and if not better, needs to be seen.  Willy's note states to return in about 2 weeks or if symptoms worsen or fail to improve for in clinic visit.  Jaime says Willy said he could call  Please advise.  Willy out of office  Mami Bingham RN

## 2023-03-16 NOTE — TELEPHONE ENCOUNTER
Spoke with patient and notified. Patient understood that an appt will be needed if symptoms do no resolve.

## 2023-03-16 NOTE — TELEPHONE ENCOUNTER
Reason for call:  Patient reporting a symptom    Symptom or request: Pt states that he has had a sinus infection X 5 weeks and took Amox Rx that was given to him by RUFINO Saavedra 3/9 and he is still sick.  Unknown if fever.  Please call patient and advise.    [facility]    Duration (how long have symptoms been present): ongoing    Have you been treated for this before? Yes    Additional comments:     Phone Number patient can be reached at:  Home number on file [phone] (home)    Best Time:  any    Can we leave a detailed message on this number:  YES    Call taken on 3/16/2023 at 8:13 AM by Mamie Jimenez

## 2023-03-30 ENCOUNTER — TELEPHONE (OUTPATIENT)
Dept: FAMILY MEDICINE | Facility: CLINIC | Age: 73
End: 2023-03-30
Payer: COMMERCIAL

## 2023-03-30 NOTE — TELEPHONE ENCOUNTER
Spoke with patient reviewed telephone encounter from 3/16/23 stating if no improvement after completing 2nd course of ABX he needs to be seen in clinic, he verbalized good understanding. Appointment scheduled 3/31/23 at 0930 with Willy RIOS.    Update sent to Willy RIOS.  Julie Behrendt RN

## 2023-03-30 NOTE — TELEPHONE ENCOUNTER
Reason for call:  Patient reporting a symptom    Symptom or request: Pt has had a sinus infeciton X 6 weeks.  Pt did virtual visit with RUFINO Saavedra 3/9 and finished antibiotic that was prescribed to him.  Pt continues to be stuffed up, has a lot of mucus and his ears are plugged.  Pt wants another Rx set to [facility]    Duration (how long have symptoms been present): ongoing    Have you been treated for this before? Yes    Additional comments:     Phone Number patient can be reached at:  Home number on file [phone] (home)    Best Time:  any    Can we leave a detailed message on this number:  YES    Call taken on 3/30/2023 at 11:09 AM by Mamie Jimenez

## 2023-03-31 ENCOUNTER — ANCILLARY PROCEDURE (OUTPATIENT)
Dept: GENERAL RADIOLOGY | Facility: CLINIC | Age: 73
End: 2023-03-31
Attending: PHYSICIAN ASSISTANT
Payer: COMMERCIAL

## 2023-03-31 ENCOUNTER — OFFICE VISIT (OUTPATIENT)
Dept: FAMILY MEDICINE | Facility: CLINIC | Age: 73
End: 2023-03-31
Payer: COMMERCIAL

## 2023-03-31 VITALS
BODY MASS INDEX: 28.48 KG/M2 | HEIGHT: 66 IN | HEART RATE: 63 BPM | SYSTOLIC BLOOD PRESSURE: 104 MMHG | TEMPERATURE: 97.3 F | RESPIRATION RATE: 18 BRPM | WEIGHT: 177.2 LBS | OXYGEN SATURATION: 96 % | DIASTOLIC BLOOD PRESSURE: 70 MMHG

## 2023-03-31 DIAGNOSIS — R06.2 WHEEZING: ICD-10-CM

## 2023-03-31 DIAGNOSIS — R06.2 WHEEZING: Primary | ICD-10-CM

## 2023-03-31 DIAGNOSIS — J01.90 ACUTE SINUSITIS WITH SYMPTOMS > 10 DAYS: ICD-10-CM

## 2023-03-31 PROCEDURE — 99214 OFFICE O/P EST MOD 30 MIN: CPT | Performed by: PHYSICIAN ASSISTANT

## 2023-03-31 PROCEDURE — 71046 X-RAY EXAM CHEST 2 VIEWS: CPT | Mod: TC | Performed by: RADIOLOGY

## 2023-03-31 RX ORDER — DOXYCYCLINE 100 MG/1
100 CAPSULE ORAL 2 TIMES DAILY
Qty: 14 CAPSULE | Refills: 0 | Status: SHIPPED | OUTPATIENT
Start: 2023-03-31 | End: 2023-05-17

## 2023-03-31 RX ORDER — ALBUTEROL SULFATE 90 UG/1
2 AEROSOL, METERED RESPIRATORY (INHALATION) EVERY 6 HOURS PRN
Qty: 18 G | Refills: 0 | Status: SHIPPED | OUTPATIENT
Start: 2023-03-31 | End: 2023-10-18

## 2023-03-31 RX ORDER — DOXYCYCLINE 100 MG/1
100 CAPSULE ORAL 2 TIMES DAILY
Qty: 14 CAPSULE | Refills: 0 | Status: CANCELLED | OUTPATIENT
Start: 2023-03-31

## 2023-03-31 ASSESSMENT — PAIN SCALES - GENERAL: PAINLEVEL: MILD PAIN (3)

## 2023-03-31 NOTE — PATIENT INSTRUCTIONS
Start Doxycycline for sinus infection.     Start Albuterol for cough and wheezing.      Sudafed and start taking this for decongestion. Also restart Claritin as well.     Follow-up in 2-4 weeks as needed based on symptoms, sooner if worsening.

## 2023-03-31 NOTE — PROGRESS NOTES
"Assessment & Plan   Acute sinusitis with symptoms > 10 days  Improved somewhat on Augmentin but did not completely resolve.  Continued congestion, postnasal drip and sinus pressure specifically over the ethmoid sinus.  Exam was unremarkable today except for tenderness over the ethmoid sinus.  Vitals within normal limits.  We will trial doxycycline and restart all decongestants including Sudafed and Claritin.  Follow-up in 2 weeks if symptoms not improved.  Would consider a CT scan of the sinuses at that time and follow-up with ENT.  - doxycycline hyclate (VIBRAMYCIN) 100 MG capsule; Take 1 capsule (100 mg) by mouth 2 times daily    Wheezing  Found on exam.  Rather significant throughout all lung fields but there is no prolonged expiratory phase.  No significant other lung sounds.  Chest x-ray done to ensure no other obvious cause.  Per my read it does show right middle lobe congestion but no obvious focal opacity.  We will treat with an albuterol inhaler for suspected bronchitis given his other symptoms.  Warning signs and symptoms were discussed and when to return to clinic or go to the ER, patient verbalized understanding.  - XR Chest 2 Views; Future  - albuterol (PROAIR HFA/PROVENTIL HFA/VENTOLIN HFA) 108 (90 Base) MCG/ACT inhaler; Inhale 2 puffs into the lungs every 6 hours as needed for shortness of breath, wheezing or cough     BMI:   Estimated body mass index is 28.6 kg/m  as calculated from the following:    Height as of this encounter: 1.676 m (5' 6\").    Weight as of this encounter: 80.4 kg (177 lb 3.2 oz).     Evelio Saavedra PA-C  [facility]    Migel Sandoval is a 72 year old, presenting for the following health issues:  Sinus Problem    Additional Questions 3/31/2023   Roomed by Marie MARY     History of Present Illness       Reason for visit:  Sinus infection  Symptom onset:  More than a month  Symptoms include:  Sinus infection  Symptom intensity:  Moderate  Symptom " "progression:  Staying the same  Had these symptoms before:  Yes  Has tried/received treatment for these symptoms:  Yes  Previous treatment was successful:  No  What makes it worse:  No  What makes it better:  No    He eats 0-1 servings of fruits and vegetables daily.He consumes 0 sweetened beverage(s) daily.He exercises with enough effort to increase his heart rate 30 to 60 minutes per day.    He is taking medications regularly.     Review of Systems   See HPI       Objective    /70 (BP Location: Right arm, Patient Position: Sitting, Cuff Size: Adult Regular)   Pulse 63   Temp 97.3  F (36.3  C) (Tympanic)   Resp 18   Ht 1.676 m (5' 6\")   Wt 80.4 kg (177 lb 3.2 oz)   SpO2 96%   BMI 28.60 kg/m    Body mass index is 28.6 kg/m .  Physical Exam   Constitutional: healthy, alert, and no distress  Head: Normocephalic. Atraumatic.  Tenderness over the ethmoid sinus.  No maxillary or frontal sinus tenderness  Eyes: No conjunctival injection, sclera anicteric  Neck: supple, no thyromegaly, nodules or asymmetry of the thyroid. No cervical LAD.  Cardiovascular: RRR. No murmurs, clicks, gallops, or rubs. No peripheral edema.   Respiratory: No resp distress.  Mild to moderate inspiratory wheezing heard throughout all lung fields.  No obvious crackles or rhonchi.  Musculoskeletal: extremities normal- no gross deformities noted, and normal muscle tone  Skin: no suspicious lesions or rashes  Neurologic: Gait normal. CN 2-12 grossly intact  Psychiatric: mentation appears normal and affect normal/bright     Chest XR: Per my read, there is right middle lobe congestion but no focal opacity.     Physician Attestation   I, Evelio Saavedra PA-C, was present with the medical/MIGUEL student who participated in the service and in the documentation of the note.  I have verified the history and personally performed the physical exam and medical decision making.  I agree with the assessment and plan of care as documented in the note.  "     Evelio Saavedra PA-C

## 2023-04-05 DIAGNOSIS — E78.5 HYPERLIPIDEMIA LDL GOAL <130: ICD-10-CM

## 2023-04-05 RX ORDER — SIMVASTATIN 40 MG
40 TABLET ORAL AT BEDTIME
Qty: 90 TABLET | Refills: 0 | Status: SHIPPED | OUTPATIENT
Start: 2023-04-05 | End: 2023-05-18

## 2023-04-05 NOTE — TELEPHONE ENCOUNTER
Pt wants refill on Simvastatin - Pt notified that he is due for wellness exam and fasting labs.  Pt will call back to schedule and wants refill TODAY/ASAP

## 2023-04-07 ENCOUNTER — TELEPHONE (OUTPATIENT)
Dept: FAMILY MEDICINE | Facility: CLINIC | Age: 73
End: 2023-04-07
Payer: COMMERCIAL

## 2023-04-07 DIAGNOSIS — J32.9 CHRONIC SINUSITIS, UNSPECIFIED LOCATION: Primary | ICD-10-CM

## 2023-04-07 NOTE — TELEPHONE ENCOUNTER
Referral placed. Also recommend a CT scan of his sinuses. This was ordered for him to complete as well.     Evelio Saavedra PA-C

## 2023-04-07 NOTE — TELEPHONE ENCOUNTER
Willy- please advise ENT referral prior to 4/14 appt- ongoing sinus infection abby Vallecillo RN

## 2023-04-07 NOTE — TELEPHONE ENCOUNTER
Pt informed, gave number to schedule sinus CT.  Has ENT appt scheduled 08-28-23.  ELISABET Vallecillo RN

## 2023-04-07 NOTE — TELEPHONE ENCOUNTER
Order/Referral Request    Who is requesting: Patient    Orders being requested: Referral to ENT // Patient does have appt with Willy Saavedra on 4/14    Reason service is needed/diagnosis: Sinus infection going on 7 weeks    When are orders needed by: ASAP    Has this been discussed with Provider: Yes    Okay to leave a detailed message?: Yes at Home number on file [phone] (home)

## 2023-04-10 ENCOUNTER — HOSPITAL ENCOUNTER (OUTPATIENT)
Dept: CT IMAGING | Facility: CLINIC | Age: 73
Discharge: HOME | End: 2023-04-10
Attending: PHYSICIAN ASSISTANT | Admitting: PHYSICIAN ASSISTANT
Payer: COMMERCIAL

## 2023-04-10 DIAGNOSIS — J32.9 CHRONIC SINUSITIS, UNSPECIFIED LOCATION: ICD-10-CM

## 2023-04-10 PROCEDURE — 70486 CT MAXILLOFACIAL W/O DYE: CPT

## 2023-04-12 ENCOUNTER — TELEPHONE (OUTPATIENT)
Dept: FAMILY MEDICINE | Facility: CLINIC | Age: 73
End: 2023-04-12
Payer: COMMERCIAL

## 2023-04-12 DIAGNOSIS — J32.9 CHRONIC SINUSITIS, UNSPECIFIED LOCATION: Primary | ICD-10-CM

## 2023-04-12 RX ORDER — PREDNISONE 20 MG/1
TABLET ORAL
Qty: 20 TABLET | Refills: 0 | Status: SHIPPED | OUTPATIENT
Start: 2023-04-12 | End: 2023-05-17

## 2023-04-12 NOTE — TELEPHONE ENCOUNTER
Spoke with patient about CT results. Ok with prednisone treatment. Patient uses [facility]      Preferred Pharmacy:  [place], [place] - [address]  [address]  [facility] [address]  Phone: [phone] Fax: [phone] Alternate Fax: [phone]

## 2023-04-27 ENCOUNTER — TELEPHONE (OUTPATIENT)
Dept: FAMILY MEDICINE | Facility: CLINIC | Age: 73
End: 2023-04-27
Payer: COMMERCIAL

## 2023-04-27 DIAGNOSIS — J32.9 CHRONIC SINUSITIS, UNSPECIFIED LOCATION: Primary | ICD-10-CM

## 2023-04-27 PROCEDURE — 99207 E-CONSULT TO ENT (ADULT OUTPT PROVIDER TO SPECIALIST WRITTEN QUESTION & RESPONSE): CPT | Performed by: PHYSICIAN ASSISTANT

## 2023-04-27 NOTE — TELEPHONE ENCOUNTER
We can retreat with Prednisone, or do an E-consult to ENT to see what they recommend. I would probably do the E-consult if Prednisone was not helpful/resolve symptoms again anyway. Pt does need to approve E-consult, which may be $100 but is often covered by insurance.     Evelio Saavedra PA-C

## 2023-04-27 NOTE — TELEPHONE ENCOUNTER
Pt does not feel retreat with prednisone would be beneficial.  agreement with E consult, understands this is billable- gives verbal permission to proceed.   Adds that last took Sudafed 4-5 days ago, had been taking 1-2x/day. Took again today which does help relief nasal congestion some. Cautioned on Sudafed use with HTN Dx.   Forwarded to Nicole Vallecillo RN

## 2023-04-27 NOTE — TELEPHONE ENCOUNTER
Patient calling. States he still has his ongoing sinus issues. Patients states the worse time is around 12pm- 2pm. States that's when there is the most mucus and sinus pain/pressure. States that he has completed his prednisone on 4/24.     Wondering what he should do next?     Okay to leave a detailed message?: Yes at Home number on file [phone] (home)

## 2023-04-27 NOTE — TELEPHONE ENCOUNTER
Reports onset sinus infection sx mid Feb.   VV with Willy 03-09-23, Augmentin prescribed, ax extended per 03-16-23 phone enc.   03-31-23 OV, doxycycline prescribed.  04-10-23 sinus CT. Prednisone prescribed 04-12-23.  ENT referral, appt not until 08-28-23.   States prednisone did help clear ears with improved hearing, sleeping better.  Sx now worse especially between 10 AM and 2 PM with increased PND, blowing nose- yellowish mucous. Ongoing fatigue.  No fevers. Has tried albuterol inhaler for prior wheezing, no longer using. Has tried Flonase in past, ineffective.  Requesting further recommendations.   Please advise.   ELISABET Vallecillo RN

## 2023-05-01 ENCOUNTER — TELEPHONE (OUTPATIENT)
Dept: FAMILY MEDICINE | Facility: CLINIC | Age: 73
End: 2023-05-01

## 2023-05-01 ENCOUNTER — E-CONSULT (OUTPATIENT)
Dept: OTOLARYNGOLOGY | Facility: CLINIC | Age: 73
End: 2023-05-01
Payer: COMMERCIAL

## 2023-05-01 DIAGNOSIS — J32.4 CHRONIC PANSINUSITIS: Primary | ICD-10-CM

## 2023-05-01 PROCEDURE — 99207 PR NO CHARGE LOS: CPT | Performed by: OTOLARYNGOLOGY

## 2023-05-01 NOTE — TELEPHONE ENCOUNTER
Patient notified.  He states he is still coughing and blowing sticky yellow mucous. No fever. Feels fatigued and not well. Has hoarse voice. Ears plugged and ringing.  Says he has tried everything. Is not taking any medications at this time. Mucous production is far worse when he eats. It does not matter what he eats.   He says he does have a history of acid reflux caused from ibuprofen at the time. His symptom was a hoarse voice which went away after stopping ibuprofen.   He will wait for the recommendation after the e-consult.   Aida COE RN

## 2023-05-01 NOTE — TELEPHONE ENCOUNTER
FYI - Status Update    Who is Calling: patient    Update: Pt is wondering if Acid Reflux could be causing his symptoms?   Pt has been seeing Evelio Saavedra PA-C  For Sinus issues.   Pt is also wondering if Evelio Saavedra PA-C  Has heard from the ENT specialist.  Please Advise.     Does caller want a call/response back: Yes     Okay to leave a detailed message?: Yes at Home number on file [phone]  [facility]

## 2023-05-01 NOTE — TELEPHONE ENCOUNTER
Based on the patients CT scan, I do not think this is related to heartburn. E-consult is placed, but have not heard recommendations yet.     Evelio Saavedra PA-C

## 2023-05-02 ENCOUNTER — TELEPHONE (OUTPATIENT)
Dept: FAMILY MEDICINE | Facility: CLINIC | Age: 73
End: 2023-05-02
Payer: COMMERCIAL

## 2023-05-02 NOTE — TELEPHONE ENCOUNTER
Please call Jaime and let him know that the ENT E-consult recommended he be seen sooner than August (when he is current scheduled to be seen). It appears we have exhausted all options for him through Primary Care so the ENT specialist is going to attempt to get him seen sooner than August. For now, I would continue nasal decongestants like Sudafed, Flonase, and Claritin type medicines for his symptoms.     Evelio Saavedra PA-C

## 2023-05-04 NOTE — TELEPHONE ENCOUNTER
FUTURE VISIT INFORMATION      FUTURE VISIT INFORMATION:    Date: 6/9/23    Time: 1pm    Location: [facility] [place]  REFERRAL INFORMATION:    Referring provider:  Evelio Saavedra    Referring providers clinic:  [facility] ent    Reason for visit/diagnosis  Per Pt, dx Chronic sinusitis, unspecified location [J32.9] referral from gio Frausto in [facility]. [place] per [place]    RECORDS REQUESTED FROM:       Clinic name Comments Records Status Imaging Status   [facility] ent  5/1/23- e-consult with Evelio Saavedra  3/31/23, 3/9/23 - note with Evelio Saavedra [facility]     Imaging  4/10/23- ct sinus   3/26/21- mr brain   3/26/21- cta head neck   3/26/21- ct head  [facility]  pacs    [facility]  11/16/22- mr brain  ce  5/4/23-  Pending  -PACS    [facility] 11/27/17- note with Dari Chew APRN CNP Epic                   May 4, 2023 12:08 PM - Faxed a request to [facility] to push Image to [place] PACS- Abida   May 15, 2023 12:50 PM - Received image in pacs and attached it to patient- Abida

## 2023-05-11 ENCOUNTER — TELEPHONE (OUTPATIENT)
Dept: FAMILY MEDICINE | Facility: CLINIC | Age: 73
End: 2023-05-11
Payer: COMMERCIAL

## 2023-05-11 NOTE — TELEPHONE ENCOUNTER
Symptoms    Describe your symptoms: Pt continues to have sinus issues and a fever on and off.  Pt wants to know what he should do while waiting to get in to ENT specialist 6/8.  Please call patient and advise.      Any pain: Yes:     How long have you been having symptoms: 3   months    Have you been seen for this:  Yes:     Appointment offered?: No    Triage offered?: Yes:     Home remedies tried: Netti Pott and several OTC meds    Preferred Pharmacy:   [facility] [address]  [address]  [facility] [address]  Phone: [phone] Fax: [phone] Alternate Fax: [phone]      Okay to leave a detailed message?: Yes at Home number on file [phone] (home)

## 2023-05-11 NOTE — TELEPHONE ENCOUNTER
Spoke with patient, relayed detailed message from Willy RIOS, he verbalized good understanding.     Patient declines another course of prednisone at this time, he will call back if he changes his mind.     Julie Behrendt RN

## 2023-05-11 NOTE — TELEPHONE ENCOUNTER
There are really no other options. I already E-consulted with ENT. We can maybe try Prednisone again but otherwise I don't know of anything else to try as long as he is still using all of the usual things like nasal sprays, Ibuprofen, decongestants.     Evelio Saavedra PA-C

## 2023-05-11 NOTE — TELEPHONE ENCOUNTER
"Pt reports persistent sinus infection/ inflammation sx, ringing in ears. Low grade fever  Also states has had mattery eyes. Voice hoarse, sounds \"nasaly\".   ENT visit 06-08-23.   Has tried multiple OTCs including mucinex, sudafed, ibu, nose drops, neti pot. States sudafed helps dry up mucous/ secretions and tends to feel better but takes sparingly due to HTN. Also states prednisone helped some.   Willy- please review for further recommendations prior to ENT visit.  ELISABET Vallecillo RN       "

## 2023-05-17 ENCOUNTER — TELEPHONE (OUTPATIENT)
Dept: FAMILY MEDICINE | Facility: CLINIC | Age: 73
End: 2023-05-17

## 2023-05-17 ENCOUNTER — HOSPITAL ENCOUNTER (EMERGENCY)
Facility: CLINIC | Age: 73
Discharge: HOME | End: 2023-05-17
Attending: FAMILY MEDICINE | Admitting: FAMILY MEDICINE
Payer: COMMERCIAL

## 2023-05-17 ENCOUNTER — ANCILLARY PROCEDURE (OUTPATIENT)
Dept: GENERAL RADIOLOGY | Facility: CLINIC | Age: 73
End: 2023-05-17
Attending: NURSE PRACTITIONER
Payer: COMMERCIAL

## 2023-05-17 ENCOUNTER — OFFICE VISIT (OUTPATIENT)
Dept: FAMILY MEDICINE | Facility: CLINIC | Age: 73
End: 2023-05-17
Payer: COMMERCIAL

## 2023-05-17 VITALS
DIASTOLIC BLOOD PRESSURE: 77 MMHG | HEIGHT: 66 IN | WEIGHT: 166 LBS | BODY MASS INDEX: 26.68 KG/M2 | OXYGEN SATURATION: 92 % | RESPIRATION RATE: 18 BRPM | SYSTOLIC BLOOD PRESSURE: 136 MMHG | TEMPERATURE: 98.8 F | HEART RATE: 78 BPM

## 2023-05-17 VITALS
HEIGHT: 66 IN | WEIGHT: 166 LBS | SYSTOLIC BLOOD PRESSURE: 130 MMHG | TEMPERATURE: 97.5 F | BODY MASS INDEX: 26.68 KG/M2 | OXYGEN SATURATION: 91 % | DIASTOLIC BLOOD PRESSURE: 86 MMHG | HEART RATE: 102 BPM | RESPIRATION RATE: 16 BRPM

## 2023-05-17 DIAGNOSIS — J32.9 CHRONIC SINUSITIS, UNSPECIFIED LOCATION: ICD-10-CM

## 2023-05-17 DIAGNOSIS — J18.9 PNEUMONIA OF BOTH LOWER LOBES DUE TO INFECTIOUS ORGANISM: Primary | ICD-10-CM

## 2023-05-17 DIAGNOSIS — J18.9 PNEUMONIA OF BOTH LOWER LOBES DUE TO INFECTIOUS ORGANISM: ICD-10-CM

## 2023-05-17 DIAGNOSIS — R05.1 ACUTE COUGH: ICD-10-CM

## 2023-05-17 DIAGNOSIS — R19.7 DIARRHEA, UNSPECIFIED TYPE: ICD-10-CM

## 2023-05-17 PROCEDURE — 99284 EMERGENCY DEPT VISIT MOD MDM: CPT | Mod: 25 | Performed by: FAMILY MEDICINE

## 2023-05-17 PROCEDURE — 87493 C DIFF AMPLIFIED PROBE: CPT | Performed by: NURSE PRACTITIONER

## 2023-05-17 PROCEDURE — 93308 TTE F-UP OR LMTD: CPT | Performed by: FAMILY MEDICINE

## 2023-05-17 PROCEDURE — 93308 TTE F-UP OR LMTD: CPT | Mod: 26 | Performed by: FAMILY MEDICINE

## 2023-05-17 PROCEDURE — 76604 US EXAM CHEST: CPT | Mod: 26 | Performed by: FAMILY MEDICINE

## 2023-05-17 PROCEDURE — 99214 OFFICE O/P EST MOD 30 MIN: CPT | Performed by: NURSE PRACTITIONER

## 2023-05-17 PROCEDURE — 76604 US EXAM CHEST: CPT | Performed by: FAMILY MEDICINE

## 2023-05-17 PROCEDURE — 71046 X-RAY EXAM CHEST 2 VIEWS: CPT | Mod: TC | Performed by: RADIOLOGY

## 2023-05-17 RX ORDER — PREDNISONE 20 MG/1
TABLET ORAL
Qty: 20 TABLET | Refills: 0 | Status: SHIPPED | OUTPATIENT
Start: 2023-05-17 | End: 2023-06-06

## 2023-05-17 RX ORDER — AZITHROMYCIN 250 MG/1
TABLET, FILM COATED ORAL
Qty: 6 TABLET | Refills: 0 | Status: SHIPPED | OUTPATIENT
Start: 2023-05-17 | End: 2023-05-22

## 2023-05-17 ASSESSMENT — PAIN SCALES - GENERAL: PAINLEVEL: SEVERE PAIN (6)

## 2023-05-17 ASSESSMENT — ACTIVITIES OF DAILY LIVING (ADL): ADLS_ACUITY_SCORE: 35

## 2023-05-17 NOTE — ED TRIAGE NOTES
"Pt was seen in clinic earlier today, diagnosed with pneumonia. Started antibiotics. \"I was told to get the machine that reads your oxygen level and if it was less than 100% I should come to ER\". Pt here with spouse.      Triage Assessment     Row Name 05/17/23 4143       Triage Assessment (Adult)    Airway WDL WDL       Respiratory WDL    Respiratory WDL all    Rhythm/Pattern, Respiratory shortness of breath    Cough Type congested;productive       Skin Circulation/Temperature WDL    Skin Circulation/Temperature WDL WDL       Cardiac WDL    Cardiac WDL WDL       Peripheral/Neurovascular WDL    Peripheral Neurovascular WDL WDL       Cognitive/Neuro/Behavioral WDL    Cognitive/Neuro/Behavioral WDL WDL              "

## 2023-05-17 NOTE — DISCHARGE INSTRUCTIONS
ICD-10-CM    1. Pneumonia of both lower lobes due to infectious organism  J18.9     oxygen levels reasssuring  here.  stay on antibiotics. recheck next week.  return for shortness of breath

## 2023-05-17 NOTE — ED NOTES
Ongoing sinus infection for about 100 days, seen today and put on antibiotics for pneumonia, told if saturation dropped below 90% to come in, when trying how to use oximeter was getting 88 so came in as directed

## 2023-05-17 NOTE — PATIENT INSTRUCTIONS
Start the Augmentin and azithromycin     Prednisone taper    Recheck on Friday at 10, present to the emergency department with any worsening symptoms or oxygen consistently less than 90%

## 2023-05-17 NOTE — TELEPHONE ENCOUNTER
Symptoms    Describe your symptoms: Patient is asking for a refill of steroid. Patient has sinus infection continuing since February, has moved into his larynx. Its getting worse, feels like Im drowning in mucus, hard to breathe. Patient has no appetite, last time he ate anything at all was 12 days ago, he coughs up phlegm. Temp has been ranging from . He has diarrhea, little squirts. Then he has to change underwear and take shower. Feels heart is beating hard.       Any pain: Yes: Head nose sinus pain    How long have you been having symptoms: 4 months      Have you been seen for this:  Yes:     Appointment offered?: Yes:     Triage offered?: Yes:     Home remedies tried: Ibuprofen    Preferred Pharmacy:   [facility] [place], [place] - [address]  [address]  [address] [facility] [address]  Phone: [phone] Fax: [phone] Alternate Fax: [phone]      Okay to leave a detailed message?: Yes at Home number on file [phone] (home)

## 2023-05-17 NOTE — PROGRESS NOTES
Assessment & Plan     Pneumonia of both lower lobes due to infectious organism  No acute distress.  With symptoms x ray was completed today which showed opacities consistent with pneumonia. Plan to start Augmentin and Azithromycin for pneumonia.  Monitor oxygen at home and present to the emergency department if oxygen staying below 90%.  Plan recheck in 2 days, sooner if needed  - amoxicillin-clavulanate (AUGMENTIN) 875-125 MG tablet; Take 1 tablet by mouth 2 times daily for 10 days  - azithromycin (ZITHROMAX) 250 MG tablet; Take 2 tablets (500 mg) by mouth daily for 1 day, THEN 1 tablet (250 mg) daily for 4 days.    Diarrhea, unspecified type  Minimal diarrhea but incontinence with coughing.  With recent antibiotic use plan C. difficile testing, unfortunately he is requiring antibiotics again due to pneumonia  - C. difficile Toxin B PCR with reflex to C. difficile Antigen and Toxins A/B EIA; Future  - C. difficile Toxin B PCR with reflex to C. difficile Antigen and Toxins A/B EIA    Acute cough  Per above  - XR Chest 2 Views; Future    Chronic sinusitis, unspecified location  Has ENT appointment scheduled for June.  Only relief he is got is with the prednisone, plan taper today along with antibiotics  - predniSONE (DELTASONE) 20 MG tablet; Take 3 tabs by mouth daily x 3 days, then 2 tabs daily x 3 days, then 1 tab daily x 3 days, then 1/2 tab daily x 3 days.      EBER Rodríguez CNP  [facility]    Migel Sandoval is a 72 year old, presenting for the following health issues:  Sinus Problem        5/17/2023     9:29 AM   Additional Questions   Roomed by CHANEL Munguia   Accompanied by Kristina     Sinus Problem     History of Present Illness       Reason for visit:  Sinus infection    He eats 0-1 servings of fruits and vegetables daily.He consumes 0 sweetened beverage(s) daily.He exercises with enough effort to increase his heart rate 20 to 29 minutes per day.  He exercises with enough  "effort to increase his heart rate 7 days per week.   He is taking medications regularly.       Acute Illness  Acute illness concerns: Sinus Problem   Onset/Duration: Ongoing since February 15th   Symptoms:  Fever: YES  Chills/Sweats: YES- at times   Headache (location?): YES  Sinus Pressure: YES  Conjunctivitis:  YES  Ear Pain: YES- clogged and ring   Rhinorrhea: YES  Congestion: YES  Sore Throat: YES  Cough: YES-productive / SOB   Wheeze: YES- possible   Decreased Appetite: YES  Nausea: YES  Vomiting: No  Diarrhea: YES  Dysuria/Freq.: No  Dysuria or Hematuria: No  Fatigue/Achiness: YES  Sick/Strep Exposure: No  Therapies tried and outcome: Antibiotics and steroids , sudefed, musinex     Loose BM with mucus - coughing causing BM to \"squirt out\" all day about a tablespoon, otherwise not having other regular bowel movements  Not eating due to poor appetite  Temp in the afternoon up to 101  Pt not feeling well - will do wellness visit at a later time.     Review of Systems   Constitutional, HEENT, cardiovascular, pulmonary, gi and gu systems are negative, except as otherwise noted.      Objective    /86 (Cuff Size: Adult Regular)   Pulse 102   Temp 97.5  F (36.4  C) (Tympanic)   Resp 16   Ht 1.676 m (5' 6\")   Wt 75.3 kg (166 lb)   SpO2 91%   BMI 26.79 kg/m    Body mass index is 26.79 kg/m .  Physical Exam   GENERAL: alert and no distress  HENT: normal cephalic/atraumatic, both ears: clear effusion, nose and mouth without ulcers or lesions, oropharynx clear, oral mucous membranes moist and sinuses: ethmoid tenderness  NECK: no adenopathy   RESP: expiratory wheezes and bronchial breath sounds throughout  CV: regular rate and rhythm, normal S1 S2, no S3 or S4, no murmur, click or rub, no peripheral edema and peripheral pulses strong  PSYCH: mentation appears normal and affect flat    Results for orders placed or performed in visit on 05/17/23   XR Chest 2 Views     Status: None    Narrative    CHEST TWO VIEWS " 5/17/2023 10:17 AM    HISTORY: Acute cough.    COMPARISON: 3/31/2023.    FINDINGS: Bibasilar and lingular opacities are present, consistent  with multifocal pneumonia. No signs of pneumothorax or significant  pleural fluid. Heart size and pulmonary vasculature are normal  appearing. No acute osseous abnormality.         MARTIN WHITING MD         SYSTEM ID:  [id number]

## 2023-05-18 ENCOUNTER — PATIENT OUTREACH (OUTPATIENT)
Dept: FAMILY MEDICINE | Facility: CLINIC | Age: 73
End: 2023-05-18
Payer: COMMERCIAL

## 2023-05-18 DIAGNOSIS — E78.5 HYPERLIPIDEMIA LDL GOAL <130: ICD-10-CM

## 2023-05-18 LAB — C DIFF TOX B STL QL: NEGATIVE

## 2023-05-18 RX ORDER — SIMVASTATIN 40 MG
40 TABLET ORAL AT BEDTIME
Qty: 90 TABLET | Refills: 0 | Status: SHIPPED | OUTPATIENT
Start: 2023-05-18 | End: 2023-07-11

## 2023-05-18 NOTE — ED PROVIDER NOTES
History     Chief Complaint   Patient presents with     Shortness of Breath     HPI  Jaime Saucedo is a 72 year old male who presents with a vague history of cough congestion fever sinus pressure ear pain rhinorrhea pharyngitis.  Symptoms started in February and it is unclear when these became worse.  However he was seen in clinic today for symptoms underwent chest x-ray demonstrating bilateral pneumonia.  Borderline O2 sats at the time of his evaluation running in the 92 to 93% range.  Had been recommended for home O2 sat monitoring so he picked 1 up at a pharmacy and at home had a single value of 88%.  Arrived here saw nursing and O2 sats been 93% including on ambulation.  Notes cough some dyspnea on exertion although able to perform activities.  No history of COPD, asthma, CHF.    Allergies:  No Known Allergies    Problem List:    Patient Active Problem List    Diagnosis Date Noted     Hyperlipidemia LDL goal <130 01/27/2022     Priority: Medium     Restless legs syndrome (RLS) 01/27/2022     Priority: Medium     Diplopia 03/26/2021     Priority: Medium     Vertigo 03/26/2021     Priority: Medium     Hypertension goal BP (blood pressure) < 140/90 04/18/2017     Priority: Medium     Obstructive sleep apnea syndrome 06/01/2011     Priority: Medium     Overview:   Epic           Past Medical History:    Past Medical History:   Diagnosis Date     Hypertension      Obstructive sleep apnea syndrome 6/1/2011     Pneumonia due to 2019 novel coronavirus 3/26/2021       Past Surgical History:    Past Surgical History:   Procedure Laterality Date     ARTHROSCOPY KNEE RT/LT Bilateral 2006    partial meniscectomy     PHACOEMULSIFICATION WITH STANDARD INTRAOCULAR LENS IMPLANT Left 3/6/2019    Procedure: Cataract Removal with Implant;  Surgeon: Johan Anthony MD;  Location: [facility] OR     PHACOEMULSIFICATION WITH STANDARD INTRAOCULAR LENS IMPLANT Right 4/28/2021    Procedure: Cataract extraction with implant.;   "Surgeon: Johan Anthony MD;  Location: [facility] OR       Family History:    Family History   Problem Relation Age of Onset     Hypertension Mother      Hypertension Father        Social History:  Marital Status:   [2]  Social History     Tobacco Use     Smoking status: Never     Smokeless tobacco: Never   Vaping Use     Vaping status: Never Used     Passive vaping exposure: Yes   Substance Use Topics     Alcohol use: Yes     Comment: rare     Drug use: No        Medications:    albuterol (PROAIR HFA/PROVENTIL HFA/VENTOLIN HFA) 108 (90 Base) MCG/ACT inhaler  amoxicillin-clavulanate (AUGMENTIN) 875-125 MG tablet  azithromycin (ZITHROMAX) 250 MG tablet  losartan (COZAAR) 25 MG tablet  pramipexole (MIRAPEX) 0.125 MG tablet  predniSONE (DELTASONE) 20 MG tablet  sildenafil (REVATIO) 20 MG tablet  simvastatin (ZOCOR) 40 MG tablet          Review of Systems   ROS:  5 point ROS negative except as noted above in HPI, including Gen., Resp., CV, GI &  system review.      Physical Exam   BP: 115/82  Pulse: 111  Temp: 98.8  F (37.1  C)  Resp: 18  Height: 167.6 cm (5' 6\")  Weight: 75.3 kg (166 lb)  SpO2: 92 %      Physical Exam  Constitutional:       General: He is in acute distress.      Appearance: He is not diaphoretic.   HENT:      Head: Atraumatic.   Eyes:      Conjunctiva/sclera: Conjunctivae normal.   Cardiovascular:      Rate and Rhythm: Normal rate and regular rhythm.      Heart sounds: No murmur heard.  Pulmonary:      Effort: No respiratory distress.      Breath sounds: No stridor. No wheezing or rhonchi.   Abdominal:      General: There is no distension.      Palpations: There is no mass.      Tenderness: There is no abdominal tenderness. There is no guarding.   Musculoskeletal:      Right lower leg: No edema.      Left lower leg: No edema.   Skin:     Coloration: Skin is not pale.      Findings: No rash.   Neurological:      General: No focal deficit present.      Mental Status: He is alert.      Motor: " No weakness.         ED Course                 Procedures    Results for orders placed during the hospital encounter of 05/17/23    POC US CHEST B-SCAN    Impression  [facility] Procedure Note    Limited Bedside ED Ultrasound of Thorax:    PROCEDURE: PERFORMED BY: Dr. Arsen Flynn MD  INDICATIONS/SYMPTOM:  shortness of breath  PROBE: High frequency linear probe  BODY LOCATION: Chest  FINDINGS:  Images of both lung hemithoracies taken in 2D in multiple rib spaces    Right side:  Lung sliding artifact  Present  Comet tail artifacts  Absent  Left side:  Lung sliding artifact  Present  Comet tail artifacts  Absent  Hemothorax: Right side Absent  Left side Absent  Pleural effusion: Right side Absent  Left side Absent    INTERPRETATION: The exam was normal. There was no free fluid identified in the chest cavity. No evidence of pneumothorax, hemothorax or pleural effusion.  IMAGE DOCUMENTATION: Images were archived to PACs system.      [facility] Procedure Note    Limited Bedside ED Cardiac Ultrasound:    PROCEDURE: PERFORMED BY: Dr. Arsen Flynn MD  INDICATIONS/SYMPTOM:  Shortness of Breath  PROBE: Cardiac phased array probe and High frequency linear probe  BODY LOCATION: Chest  FINDINGS:  The ultrasound was performed utilizing the subcostal, parasternal long axis, parasternal short axis and apical 4 chamber views.  Cardiac contractility:  Present  Gross estimation of cardiac kinesis: normal  Pericardial Effusion:  None  RV:LV ratio: LV > RV  IVC:  Diameter:  IVC diameter expiration (IVCe) 0-1 cm  IVC diameter inspiration (IVCi) 0 cm  Collapsibility:  IVC collapses > 50% with inspiration  INTERPRETATION:    Chamber size and motion were grossly normal with LV > RV, normal cardiac kinesis.  No pericardial effusion was found.  IVC visualized and findings indicate normovolemia.  IMAGE DOCUMENTATION: Images were archived to PACs system.            Critical Care time:  none               Results for orders  placed or performed during the hospital encounter of 05/17/23 (from the past 24 hour(s))   POC US CHEST B-SCAN    Impression    [facility] Procedure Note      Limited Bedside ED Ultrasound of Thorax:    PROCEDURE: PERFORMED BY: Dr. Arsen Flynn MD  INDICATIONS/SYMPTOM:  shortness of breath  PROBE: High frequency linear probe  BODY LOCATION: Chest  FINDINGS:  Images of both lung hemithoracies taken in 2D in multiple rib spaces        Right side:  Lung sliding artifact  Present     Comet tail artifacts  Absent   Left side:  Lung sliding artifact  Present     Comet tail artifacts  Absent   Hemothorax: Right side Absent     Left side Absent   Pleural effusion: Right side Absent      Left side Absent    INTERPRETATION: The exam was normal. There was no free fluid identified in the chest cavity. No evidence of pneumothorax, hemothorax or pleural effusion.  IMAGE DOCUMENTATION: Images were archived to PACs system.      [facility] Procedure Note      Limited Bedside ED Cardiac Ultrasound:    PROCEDURE: PERFORMED BY: Dr. Arsen Flynn MD  INDICATIONS/SYMPTOM:  Shortness of Breath  PROBE: Cardiac phased array probe and High frequency linear probe  BODY LOCATION: Chest  FINDINGS:   The ultrasound was performed utilizing the subcostal, parasternal long axis, parasternal short axis and apical 4 chamber views.  Cardiac contractility:  Present  Gross estimation of cardiac kinesis: normal  Pericardial Effusion:  None  RV:LV ratio: LV > RV  IVC:    Diameter:  IVC diameter expiration (IVCe) 0-1 cm                                                   IVC diameter inspiration (IVCi) 0 cm                                                       Collapsibility:  IVC collapses > 50% with inspiration  INTERPRETATION:    Chamber size and motion were grossly normal with LV > RV, normal cardiac kinesis.  No pericardial effusion was found.  IVC visualized and findings indicate normovolemia.  IMAGE DOCUMENTATION: Images were  archived to PACs system.             Medications - No data to display    Assessments & Plan (with Medical Decision Making)     MDM: Jaime Saucedo is a 72 year old male who presents with pneumonia diagnosed in clinic and arriving here with concerns for possible hypoxia at home although O2 sats here reassuring normal vital signs and ambulated with O2 sat dropping only to 93%.  Periodically in the room I will see a drop to about 90% but nothing consistent -for the most part oxygen saturations were in the mid 90s.  He has no significant respiratory distress.  There is no accessory muscle use.  There is no wheezing.  Bedside ultrasound is negative for CHF and there are no findings to suggest alternative diagnosis.  The bilateral pneumonia is atypical and I did agree with the azithromycin Augmentin has been prescribed and recommended close follow-up with his primary provider.  Precautions given for return.  Safe for discharge home      I have reviewed the nursing notes.    I have reviewed the findings, diagnosis, plan and need for follow up with the patient.           Medical Decision Making  The patient's presentation was of moderate complexity (an acute illness with systemic symptoms).    The patient's evaluation involved:  history and exam without other [facility] data elements    The patient's management necessitated only low risk treatment.        Discharge Medication List as of 5/17/2023  6:03 PM          Final diagnoses:   Pneumonia of both lower lobes due to infectious organism - oxygen levels reasssuring  here.  stay on antibiotics. recheck next week.  return for shortness of breath       5/17/2023   [facility] EMERGENCY DEPT     Arsen Flynn MD  05/17/23 [id number]

## 2023-05-18 NOTE — TELEPHONE ENCOUNTER
Hospital/TCU/ED for chronic condition Discharge Protocol    Has follow-up appointment scheduled for tomorrow.    Gayla Mccray RN

## 2023-05-19 ENCOUNTER — OFFICE VISIT (OUTPATIENT)
Dept: FAMILY MEDICINE | Facility: CLINIC | Age: 73
End: 2023-05-19
Payer: COMMERCIAL

## 2023-05-19 VITALS
SYSTOLIC BLOOD PRESSURE: 108 MMHG | WEIGHT: 164 LBS | HEIGHT: 65 IN | HEART RATE: 83 BPM | TEMPERATURE: 97.2 F | DIASTOLIC BLOOD PRESSURE: 70 MMHG | OXYGEN SATURATION: 92 % | BODY MASS INDEX: 27.32 KG/M2 | RESPIRATION RATE: 20 BRPM

## 2023-05-19 DIAGNOSIS — J18.9 PNEUMONIA OF BOTH LOWER LOBES DUE TO INFECTIOUS ORGANISM: Primary | ICD-10-CM

## 2023-05-19 PROCEDURE — 99213 OFFICE O/P EST LOW 20 MIN: CPT | Performed by: NURSE PRACTITIONER

## 2023-05-19 ASSESSMENT — PAIN SCALES - GENERAL: PAINLEVEL: MODERATE PAIN (4)

## 2023-05-19 NOTE — PROGRESS NOTES
"  Assessment & Plan     Pneumonia of both lower lobes due to infectious organism  No acute distress, afebrile and oxygen 92%.  Jaime reports mild improvement in symptoms since starting the antibiotics.  Oxygen levels have stayed above 90% when monitoring at home. Plan to continue with current antibiotics and prednisone at this time.  Follow up with any worsening symptoms over the weekend.        MED REC REQUIRED{  Post Medication Reconciliation Status: discharge medications reconciled, continue medications without change      EBER Rodríguez CNP  [facility]   Jaime is a 72 year old, presenting for the following health issues:  Sinus Problem, Cough, and ER F/U        5/19/2023    10:15 AM   Additional Questions   Roomed by Avani     History of Present Illness       Reason for visit:  Sinus infection    He eats 0-1 servings of fruits and vegetables daily.He consumes 0 sweetened beverage(s) daily.He exercises with enough effort to increase his heart rate 20 to 29 minutes per day.  He exercises with enough effort to increase his heart rate 7 days per week.   He is taking medications regularly.       ED/UC Followup:    Facility:  [place]  Date of visit: 5/17/2023  Reason for visit: Pneumonia  Current Status: slight improvement    Feeling a little better today, able to breath a little easier  Monitoring oxygen at home    Review of Systems   Constitutional, HEENT, cardiovascular, pulmonary, gi and gu systems are negative, except as otherwise noted.      Objective    /70   Pulse 83   Temp 97.2  F (36.2  C) (Tympanic)   Resp 20   Ht 1.651 m (5' 5\")   Wt 74.4 kg (164 lb)   SpO2 92%   BMI 27.29 kg/m    Body mass index is 27.29 kg/m .  Physical Exam   GENERAL: alert and no distress  RESP: expiratory wheezes and bronchial breath sounds throughout  CV: regular rate and rhythm, normal S1 S2, no S3 or S4, no murmur  PSYCH: mentation appears normal, affect " normal/bright

## 2023-05-23 ENCOUNTER — TELEPHONE (OUTPATIENT)
Dept: FAMILY MEDICINE | Facility: CLINIC | Age: 73
End: 2023-05-23
Payer: COMMERCIAL

## 2023-05-23 DIAGNOSIS — R19.7 DIARRHEA, UNSPECIFIED TYPE: Primary | ICD-10-CM

## 2023-05-23 NOTE — TELEPHONE ENCOUNTER
Spoke with Jaime, he says he does not have diarrhea any longer, his concern is feeling weak, coughing up yellow stuff every day for months and just not feeling well. He has 4 more doses of Prednisone and 4 more days of Augmentin left. Advised to continue treatment as ordered and he should follow up here in the clinic if not better. He does have an ENT appointment 6/9/23 but should not wait that long if he is not improving.

## 2023-05-23 NOTE — TELEPHONE ENCOUNTER
After his C diff was negative, I did tell him he could try Imodium, did he try this?  I did order additional stool testing and repeat C diff testing with the additional antibiotics to further evaluate.     Thanks,     EBER Rodríguez CNP

## 2023-05-23 NOTE — TELEPHONE ENCOUNTER
Symptoms    Describe your symptoms: Diarrhea x 2 weeks.  Pt said he is being treated for Pneumonia. Pt was into see JESSICA Arias 5/19/23. Pt is currently taking Prednisone and Antibiotic.  Pt is wondering what is the next step regarding his Bowels.  Please Advise.     Preferred Pharmacy:   [facility] [place], [place] - [address]  [address]  [address] [facility] [address]  Phone: [phone] Fax: [phone] Alternate Fax: [phone]      Okay to leave a detailed message?: Yes at Home number on file [phone] (home)    Aimee "[employer] [place]

## 2023-06-02 ENCOUNTER — TELEPHONE (OUTPATIENT)
Dept: FAMILY MEDICINE | Facility: CLINIC | Age: 73
End: 2023-06-02
Payer: COMMERCIAL

## 2023-06-02 DIAGNOSIS — J18.9 PNEUMONIA OF BOTH LOWER LOBES DUE TO INFECTIOUS ORGANISM: ICD-10-CM

## 2023-06-02 DIAGNOSIS — J32.9 CHRONIC SINUSITIS, UNSPECIFIED LOCATION: Primary | ICD-10-CM

## 2023-06-02 NOTE — TELEPHONE ENCOUNTER
"  General Call      Reason for Call:  pneumonia    What are your questions or concerns:  Pt calling because he saw Marie back on 5/19 for pneumonia, was put on medication and finished taking it a couple of days ago. Pt stated he has been monitoring his oxygen levels at home and they have been around 92-94 and he gets a little lightheaded at times. Stated he is feeling kind of better, has an occasional low temp, but still feeling \"a little run down\". Wondering if he should have a follow up appt with Marie, or if there is anything he should do.      Okay to leave a detailed message?: Yes at Home number on file [phone] (home)  "

## 2023-06-02 NOTE — TELEPHONE ENCOUNTER
I will give him another 5 days of Augmentin but he needs follow up for the pneumonia, please get him scheduled.     Thanks,     EBER Rodríguez CNP     Psychiatry Progress Note  Patient is excited about going on a pass for shopping with staff today  He is better groomed has taken away the same clothing he was wearing for days and now has only 2 layers of clothing as of this morning which is a big improvement  He is friendly pleasant not demanding or agitated and is compliant by attending groups and is no longer threatening or cursing  He is compliant with medications and reports no side effects or problems  Is eating and sleeping well and no side effects reported  His room is also clean with no clothes on the floor  He has been taking showers and is not drinking excess fluids like he was in the past   He is looking forward to starting club house meetings once he does well this week  Still with occasional bouts of low frustration tolerance but redirectable    Excited about a shopping trip today with staff escort    Current medications:    Current Facility-Administered Medications:     [MAR Hold - Suspended Admission] acetaminophen (TYLENOL) tablet 325 mg, 325 mg, Oral, Q6H PRN, BRIT ChandlerPUSHPA    [facility] Hold - Suspended Admission] acetaminophen (TYLENOL) tablet 650 mg, 650 mg, Oral, Q6H PRN, BRIT ChandlerPUSHPA    [facility] Hold - Suspended Admission] acetaminophen (TYLENOL) tablet 975 mg, 975 mg, Oral, Q8H PRN, BRIT ChandlerPUSHPA    [facility] Hold - Suspended Admission] aluminum-magnesium hydroxide-simethicone (MYLANTA) 200-200-20 mg/5 mL oral suspension 30 mL, 30 mL, Oral, Q4H PRN, Cornelio Booker PA-C, 30 mL at 01/24/20 2352    [MAR Hold - Suspended Admission] atorvastatin (LIPITOR) tablet 10 mg, 10 mg, Oral, Daily With Dinner, Cornelio Booker PA-C, 10 mg at 02/04/20 1708    [MAR Hold - Suspended Admission] benztropine (COGENTIN) injection 1 mg, 1 mg, Intramuscular, Q6H PRN, Cornelio Booker PA-C    [facility] Hold - Suspended Admission] benztropine (COGENTIN) tablet 1 mg, 1 mg, Oral, Q6H PRN, BRIT ChandlerC    [facility] Hold - Suspended Admission] clozapine (CLOZARIL) tablet 200 mg, 200 mg, Oral, Daily, Kalli Joshi MD, 200 mg at 02/04/20 1708    [MAR Hold - Suspended Admission] divalproex sodium (DEPAKOTE) EC tablet 1,000 mg, 1,000 mg, Oral, BID, Kalli Joshi MD, 1,000 mg at 02/04/20 2032    [MAR Hold - Suspended Admission] docusate sodium (COLACE) capsule 100 mg, 100 mg, Oral, BID, [facility] MARIA GUADALUPE Byers, 100 mg at 02/05/20 0820    [MAR Hold - Suspended Admission] haloperidol (HALDOL) tablet 5 mg, 5 mg, Oral, Q6H PRN, [facility] Hold - Suspended Admission] haloperidol lactate (HALDOL) injection 5 mg, 5 mg, Intramuscular, Q6H PRN, [facility] Hold - Suspended Admission] levothyroxine tablet 75 mcg, 75 mcg, Oral, Early Morning, [facility], 75 mcg at 02/05/20 0547    [MAR Hold - Suspended Admission] LORazepam (ATIVAN) 2 mg/mL injection 1 mg, 1 mg, Intramuscular, Q6H PRN, [facility] Hold - Suspended Admission] LORazepam (ATIVAN) tablet 1 mg, 1 mg, Oral, Q6H PRN, [facility] Hold - Suspended Admission] magnesium hydroxide (MILK OF MAGNESIA) 400 mg/5 mL oral suspension 30 mL, 30 mL, Oral, Daily PRN, [facility] Hold - Suspended Admission] metFORMIN (GLUCOPHAGE) tablet 500 mg, 500 mg, Oral, BID With Meals, [facility] XUAN Byers, 500 mg at 02/05/20 0820    [MAR Hold - Suspended Admission] nicotine polacrilex (NICORETTE) gum 2 mg, 2 mg, Oral, Q2H PRN, [facility] Hold - Suspended Admission] OLANZapine (ZyPREXA) tablet 5 mg, 5 mg, Oral, After Lunch, Kalli Joshi MD, 5 mg at 02/04/20 1311    [MAR Hold - Suspended Admission] oxybutynin (DITROPAN-XL) 24 hr tablet 5 mg, 5 mg, Oral, Daily, Sandy Byers PA-C, 5 mg at 02/05/20 0820    [MAR Hold - Suspended Admission] pantoprazole (PROTONIX) EC tablet 40 mg, 40 mg, Oral, Early Morning, Sandy Byers PA-C, 40 mg at 02/05/20 0547    [MAR Hold - Suspended Admission] traZODone (DESYREL) tablet 50 mg, 50 mg, Oral, HS PRN, Sebas Apo, PA-C, 50 mg at 02/01/20 8508    Current Outpatient Medications:     atorvastatin (LIPITOR) 10 mg tablet, Take 1 tablet (10 mg total) by mouth daily (Patient taking differently: Take 10 mg by mouth daily with dinner ), Disp: 30 tablet, Rfl: 2    cloZAPine (CLOZARIL) 100 mg tablet, Take 100 mg by mouth daily , Disp: , Rfl:     cloZAPine (CLOZARIL) 100 mg tablet, Take 100 mg by mouth daily at bedtime, Disp: , Rfl:     clozapine (CLOZARIL) 200 MG tablet, Take 200 mg by mouth daily at bedtime, Disp: , Rfl:     clozapine (CLOZARIL) 50 MG tablet, Take 50 mg by mouth daily at bedtime, Disp: , Rfl:     divalproex sodium (DEPAKOTE) 500 mg EC tablet, Take 1,000 mg by mouth 2 (two) times a day, Disp: , Rfl:     docusate sodium (COLACE) 100 mg capsule, Take 100 mg by mouth 2 (two) times a day, Disp: , Rfl:     levothyroxine 75 mcg tablet, TAKE ONE TABLET BY MOUTH EVERY DAY (HYPOTHYROIDISM), Disp: 30 tablet, Rfl: 2    metFORMIN (GLUCOPHAGE) 500 mg tablet, TAKE ONE TABLET BY MOUTH TWO TIMES A DAY (DIABETES) (Patient taking differently: Take 500 mg by mouth 2 (two) times a day with meals ), Disp: 60 tablet, Rfl: 2    OLANZapine (ZyPREXA) 10 mg tablet, Take 10 mg by mouth daily after lunch, Disp: , Rfl:     oxybutynin (DITROPAN-XL) 5 mg 24 hr tablet, Take 5 mg by mouth daily, Disp: , Rfl:     pantoprazole (PROTONIX) 40 mg tablet, TAKE ONE TABLET BY MOUTH EVERY DAY AFTER FOOD (Patient taking differently: Take 40 mg by mouth daily in the early morning ), Disp: 30 tablet, Rfl: 2    Alcohol Swabs PADS, Apply topically daily, Disp: 200 each, Rfl: 2    glucose blood (FREESTYLE LITE) test strip, 1 each by Other route daily Use as instructed, Disp: 100 each, Rfl: 5    Lancets (FREESTYLE) lancets, by Other route daily Use as instructed, Disp: 100 each, Rfl: 5    lisinopril (ZESTRIL) 5 mg tablet, TAKE ONE TABLET BY MOUTH DAILY (Patient not taking: Reported on 1/10/2020), Disp: 30 tablet, Rfl: 2    multivitamin (THERAGRAN) TABS, Take 1 tablet by mouth daily Supplement (Patient not taking: Reported on 1/10/2020), Disp: 30 tablet, Rfl: 2  Justification if on more than two antipsychotics:  Due to lack of response to single antipsychotics   Side effects if any: none    Risks , benefits, side effects and precautions of medications discussed with patient and reminded patient to let us know any problems with medications     Objective:     Vital Signs:  Vitals:    02/03/20 0705 02/04/20 0700 02/05/20 0700 02/05/20 0705   BP: 106/59 118/77 122/74 119/74   BP Location: Left arm Right arm Left arm Left arm   Pulse: 77 94 92 91   Resp: 19 20 20 20   Temp:  97 5 °F (36 4 °C) 97 8 °F (36 6 °C)    TempSrc:   Temporal    Weight:       Height:         Appearance:  age appropriate, casually dressed, disheveled and overweight with two l layers of clothing only today friendly pleasant cooperative   Behavior:  Somewhat childish but friendly and pleasant with good   Speech:  loud and pressured times but redirectable   Mood:  angry, anxious, euphoric, irritable and labile   Affect:  inappropriate, increased in intensity, increased in range, labile, mood-congruent and redirectable   Thought Process:  circumstantial, concrete, disorganized, goal directed, perserverative and tangential   Thought Content:  delusions  persecutory  no overt delusions elicited but he does appear to harbor some underlying paranoia  No preoccupation with violence and no obsessions  No current suicidal homicidal thoughts intent or plans  Perceptual Disturbances: None and not appearing to respond   Risk Potential: Potential for Aggression Yes Due to previous history of aggression   Sensorium:  person, place, time/date, situation, day of week, month of year, year and time   Cognition:  grossly intact aware of current events, no problems with recent or remote memory    No language deficits Consciousness:  alert and awake    Attention: Some distractibility present with some problems with attention and concentration but redirectable   Intellect: Considered to be bothered   Insight:  Fair   Judgment: limited redirect      Motor Activity: no abnormal movements         Recent Labs:  Results Reviewed     None          I/O Past 24 hours:  No intake/output data recorded  No intake/output data recorded  Assessment / Plan:     Schizoaffective disorder, bipolar type [facility])      Reason for continued inpatient care: To stabilize mood and prevent aggressive behavior  Acceptance by patient:  Beginning to accept  Hopefulness in recovery:  Living in a group home again preferably at the [facility]  Understanding of medications :  Has limited understanding  Involved in reintegration process:  See how he does with off [facility] in relatoinship with psychiatrist:  Sometimes trusting    Recommended Treatment:    Medication changes:  1) no changes today    Non-pharmacological treatments  1) Continue with individual therapy, group therapy, milieu therapy and occupational therapy using recovery principles and psycho-education about accepting illness and need for treatment   2) encouraged to refrain from threatening demanding behaviors and to attend to ADLs skills and to attend required groups to get higher privileges  3) see how he does with off hospital privileges with staff escort  Safety  1) Safety/communication plan established targeting dynamic risk factors above  Discharge Plan to a personal care home like a:  Once stabilized and once the fire setting risk evaluation is back    Counseling / Coordination of Care    Total floor / unit time spent today 15 minutes  Greater than 50% of total time was spent with the patient and / or family counseling and / or coordination of care    Receptive to supportive listening and counseling about symptom management     Patient's Rights, confidentiality and exceptions to confidentiality, use of automated medical record, Claudia Mei staff access to medical record, and consent to treatment reviewed      Judie Blas MD

## 2023-06-02 NOTE — TELEPHONE ENCOUNTER
OV followed by ED visit 05-17-23, pneumonia. F/U with CIRO Salazar, 05-19-23.   Has completed Augmentin, Azithromycin, prednisone.  States sinus sx 90% improved, pneumonia sx feels 80% better but still feeling fatigued, weak, shaky at times, mild exertional SOA with 02 sats 92-94%.  Dry cough, intermittent wheezing. Not using albuterol neb. Still has low grade fever 100 in PM, takes ibu.   Decreased appetite, mostly eating yogurt, drinking Boost and alina.   Pt requesting Marie to review for further Rx, F/U.  ELISABET Vallecillo RN

## 2023-06-02 NOTE — TELEPHONE ENCOUNTER
Notified patient and scheduled follow up appointment   Shanelle Gibson MA on 6/2/2023 at 3:27 PM

## 2023-06-06 ENCOUNTER — ANCILLARY PROCEDURE (OUTPATIENT)
Dept: GENERAL RADIOLOGY | Facility: CLINIC | Age: 73
End: 2023-06-06
Attending: NURSE PRACTITIONER
Payer: COMMERCIAL

## 2023-06-06 ENCOUNTER — OFFICE VISIT (OUTPATIENT)
Dept: FAMILY MEDICINE | Facility: CLINIC | Age: 73
End: 2023-06-06
Payer: COMMERCIAL

## 2023-06-06 VITALS
DIASTOLIC BLOOD PRESSURE: 62 MMHG | SYSTOLIC BLOOD PRESSURE: 110 MMHG | WEIGHT: 163 LBS | TEMPERATURE: 97.5 F | BODY MASS INDEX: 27.16 KG/M2 | HEART RATE: 68 BPM | RESPIRATION RATE: 18 BRPM | HEIGHT: 65 IN | OXYGEN SATURATION: 97 %

## 2023-06-06 DIAGNOSIS — J18.9 PNEUMONIA OF BOTH LOWER LOBES DUE TO INFECTIOUS ORGANISM: Primary | ICD-10-CM

## 2023-06-06 DIAGNOSIS — J18.9 PNEUMONIA OF BOTH LOWER LOBES DUE TO INFECTIOUS ORGANISM: ICD-10-CM

## 2023-06-06 PROCEDURE — 99213 OFFICE O/P EST LOW 20 MIN: CPT | Performed by: NURSE PRACTITIONER

## 2023-06-06 PROCEDURE — 71046 X-RAY EXAM CHEST 2 VIEWS: CPT | Mod: TC | Performed by: RADIOLOGY

## 2023-06-06 ASSESSMENT — PAIN SCALES - GENERAL: PAINLEVEL: NO PAIN (0)

## 2023-06-06 NOTE — PROGRESS NOTES
"  Assessment & Plan     Pneumonia of both lower lobes due to infectious organism  No acute distress.  Repeat x ray showed improvement of infiltrates.  Continue with current antibiotics and follow up with ENT as planned on Friday for chronic sinusitis. Symptomatic care and follow up discussed.  - XR Chest 2 Views; Future         EBER Rodríguez CNP  [facility]    Migel Sandoval is a 73 year old, presenting for the following health issues:  Cough (Follow up)        6/6/2023    11:10 AM   Additional Questions   Roomed by Nilda     History of Present Illness       Reason for visit:  Sinus infection    He eats 0-1 servings of fruits and vegetables daily.He consumes 0 sweetened beverage(s) daily.He exercises with enough effort to increase his heart rate 20 to 29 minutes per day.  He exercises with enough effort to increase his heart rate 7 days per week.   He is taking medications regularly.       Acute Illness Follow Up   Acute illness concerns: mucus   Onset/Duration: Follow up from initial 5/17/2023 appointment   Symptoms:  Fever: temps in 99's   Chills/Sweats: No  Headache (location?): No  Sinus Pressure: No  Conjunctivitis:  No  Ear Pain: no  Rhinorrhea: No  Congestion: No  Sore Throat: No  Cough: YES-productive of clear sputum - states this is better   Wheeze: YES - little pt feels   Decreased Appetite: No  Nausea: No  Vomiting: No  Diarrhea: No  Dysuria/Freq.: No  Dysuria or Hematuria: No  Fatigue/Achiness: No  Sick/Strep Exposure: No  Therapies tried and outcome: augmentin, zithomax, prednisone     Last fever was 4 days ago  ENT appointment is on Friday    Review of Systems   Constitutional, HEENT, cardiovascular, pulmonary, gi and gu systems are negative, except as otherwise noted.      Objective    /62 (Cuff Size: Adult Large)   Pulse 68   Temp 97.5  F (36.4  C) (Tympanic)   Resp 18   Ht 1.651 m (5' 5\")   Wt 73.9 kg (163 lb)   SpO2 97%   BMI 27.12 kg/m    Body " mass index is 27.12 kg/m .  Physical Exam   GENERAL: healthy, alert and no distress  HENT: ear canals and TM's normal, nose and mouth without ulcers or lesions  NECK: no adenopathy  RESP: lungs clear to auscultation - no rales, rhonchi or wheezes  CV: regular rate and rhythm, normal S1 S2, no S3 or S4, no murmur  PSYCH: mentation appears normal, affect normal/bright    Results for orders placed or performed in visit on 06/06/23   XR Chest 2 Views     Status: None    Narrative    CHEST TWO VIEWS   6/6/2023 11:45 AM     HISTORY: Pneumonia of both lower lobes due to infectious organism.    COMPARISON: 5/17/2023.      Impression    IMPRESSION: Improvement of bibasilar opacities since 5/17/2023. Mild  bibasilar atelectasis or scarring persists. No new airspace disease.  Normal cardiac silhouette.    RUDDY GOMEZ MD         SYSTEM ID:  [id number]

## 2023-06-09 ENCOUNTER — OFFICE VISIT (OUTPATIENT)
Dept: OTOLARYNGOLOGY | Facility: CLINIC | Age: 73
End: 2023-06-09
Attending: PHYSICIAN ASSISTANT
Payer: COMMERCIAL

## 2023-06-09 ENCOUNTER — PRE VISIT (OUTPATIENT)
Dept: OTOLARYNGOLOGY | Facility: CLINIC | Age: 73
End: 2023-06-09

## 2023-06-09 VITALS
SYSTOLIC BLOOD PRESSURE: 135 MMHG | TEMPERATURE: 98 F | WEIGHT: 164 LBS | BODY MASS INDEX: 27.32 KG/M2 | HEART RATE: 73 BPM | OXYGEN SATURATION: 96 % | HEIGHT: 65 IN | DIASTOLIC BLOOD PRESSURE: 82 MMHG

## 2023-06-09 DIAGNOSIS — R04.0 FREQUENT EPISTAXIS: ICD-10-CM

## 2023-06-09 DIAGNOSIS — R09.81 NASAL CONGESTION: ICD-10-CM

## 2023-06-09 DIAGNOSIS — J32.4 CHRONIC PANSINUSITIS: Primary | ICD-10-CM

## 2023-06-09 DIAGNOSIS — R43.8 HYPOSMIA: ICD-10-CM

## 2023-06-09 DIAGNOSIS — J32.9 CHRONIC SINUSITIS, UNSPECIFIED LOCATION: ICD-10-CM

## 2023-06-09 PROCEDURE — 31231 NASAL ENDOSCOPY DX: CPT | Performed by: OTOLARYNGOLOGY

## 2023-06-09 PROCEDURE — 99203 OFFICE O/P NEW LOW 30 MIN: CPT | Mod: 25 | Performed by: OTOLARYNGOLOGY

## 2023-06-09 RX ORDER — MOMETASONE FUROATE MONOHYDRATE 50 UG/1
2 SPRAY, METERED NASAL DAILY
Qty: 17 G | Refills: 1 | Status: SHIPPED | OUTPATIENT
Start: 2023-06-09 | End: 2023-10-18

## 2023-06-09 ASSESSMENT — PAIN SCALES - GENERAL: PAINLEVEL: NO PAIN (0)

## 2023-06-09 NOTE — PATIENT INSTRUCTIONS
1. Please follow-up in clinic as needed  2. Please call the ENT clinic with any questions,concerns, new or worsening symptoms.    -Clinic number is [phone]   - [facility]'s direct line (Dr. Manning's nurse) [phone]

## 2023-06-09 NOTE — NURSING NOTE
"Chief Complaint   Patient presents with     Consult     Chronic sinusitis      Blood pressure 135/82, pulse 73, temperature 98  F (36.7  C), height 1.651 m (5' 5\"), weight 74.4 kg (164 lb), SpO2 96 %.    Brian Saul LPN    "

## 2023-06-09 NOTE — PROGRESS NOTES
[facility]                   New Patient Visit      Encounter date: June 9, 2023    Referring Provider:   Evelio Saavedra PA-C  [address] [place] [id number]    Chief Complaint: Sinusitis    History of Present Illness: Jaime Saucedo is a pleasant 73-year-old gentleman who I am seeing for sinusitis.  He had sinusitis develop several months ago that would not resolve with several course of antibiotics and even steroids.  Eventually presented to the ER with shortness of breath and hypoxia on home O2 monitor.  He was given azithromycin, Augmentin and steroids.  Since then, the amount of nasal mucus production, shortness of breath, and cough have all improved.    He admits to baseline chronic nasal congestion most of his life.  He has protracted sinus infections that have not lasted longer than a month.    Review of systems: A 14-point review of systems has been conducted and was negative for any notable symptoms, except as dictated in the history of present illness.     Past Medical History:   Diagnosis Date     Hypertension      Obstructive sleep apnea syndrome 6/1/2011     Pneumonia due to 2019 novel coronavirus 3/26/2021        Past Surgical History:   Procedure Laterality Date     ARTHROSCOPY KNEE RT/LT Bilateral 2006    partial meniscectomy     PHACOEMULSIFICATION WITH STANDARD INTRAOCULAR LENS IMPLANT Left 3/6/2019    Procedure: Cataract Removal with Implant;  Surgeon: Johan Anthony MD;  Location: [facility] OR     PHACOEMULSIFICATION WITH STANDARD INTRAOCULAR LENS IMPLANT Right 4/28/2021    Procedure: Cataract extraction with implant.;  Surgeon: Johan Anthony MD;  Location: [facility] OR        Family History   Problem Relation Age of Onset     Hypertension Mother      Hypertension Father         Social History     Socioeconomic History     Marital status:    Tobacco Use     Smoking status: Never     Smokeless tobacco: Never   Vaping Use     Vaping  status: Never Used     Passive vaping exposure: Yes   Substance and Sexual Activity     Alcohol use: Yes     Comment: rare     Drug use: No     Sexual activity: Yes     Partners: Female        Physical Exam:  Vital signs: There were no vitals taken for this visit.   General Appearance: No acute distress, appropriate demeanor, conversant  Eyes: moist conjunctivae; EOMI; pupils symmetric; visual acuity grossly intact; no proptosis  Head: normocephalic; overall symmetric appearance without deformity  Face: overall symmetric without deformity; HB I/VI  Ears: Normal appearance of external ear; external meatus normal in appearance; TMs intact without perforation bilaterally;   Nose: No external deformity; see nasal endoscopy  Oral Cavity/oropharynx: Normal appearance of mucosa; tongue midline; no mass or lesions; oropharynx without obvious mucosal abnormality  Neck: no palpable lymphadenopathy; thyroid without palpable nodules  Lungs: symmetric chest rise; no wheezing  CV: Good distal perfusion; normal heart rate  Extremities: No deformity  Neurologic Exam: Cranial nerves II-XII are grossly intact; no focal deficit      Procedure Note  Procedure performed: Rigid nasal endoscopy  Indication: To evaluate for sinonasal pathology not visualized on routine anterior rhinoscopy  Anesthesia: 4% topical lidocaine with 0.05% oxymetazoline  Description of procedure: A 30 degree, 3 mm rigid endoscope was inserted into bilateral nasal cavities and the nasal valve, nasal cavity, middle meatus, sphenoethmoid recess, and nasopharynx were thoroughly evaluated for evidence of obstruction, edema, purulence, polyps and/or mass/lesion.     Bon[place]-A[place] Endoscopic Scoring System  Endoscopic observation Right Left   Polyps in middle meatus (0 = absent, 1 = restricted to middle meatus, 2 = Beyond middle meatus) 0 0   Discharge (0 = absent, 1 = thin and clear, 2 = thick, purulent) 0 1   Edema (0 = absent, 1 = mild-moderate, 2 = moderate-severe) 0  1   Crusting (0 = absent, 1 = mild-moderate, 2 = moderate-severe) 0 0   Scarring (0= absent, 1 = mild-moderate, 2 = moderate-severe) 0 0   Total 0 2     Findings  RT: MM and SER are clear  LT: Mild mucoid drainage from the middle meatus with mild edema    The patient tolerated the procedure well without complication.     Laboratory Review:  n/a    Imaging Review:  Reviewed CT sinus from April 10, 2023:    IMPRESSION:    1.  Diffuse sinusitis with severe mucosal thickening and/or sinus  opacification as detailed.  2.  Diffuse effacement of the paranasal sinus drainage pathways by  mucosal thickening.  3.  Partial opacification of bilateral mastoid and middle ear  Cavities.      Pathology Review:  n/a    Assessment/Medical Decision Making/Plan:  CRSsNP, with recent protracted episode with lower airway component  Nasal congestion  Hyposmia    Jaime is a 73-year-old gentleman with likely long history of chronic sinusitis.  He had a protracted episode of sinusitis that has since resolved with several courses of antibiotics.  He is currently feeling much better today.  He has minimal sinonasal symptoms and his nasal endoscopy is largely clear.  We discussed continued monitoring of his symptoms while he uses Flonase nasal spray to control any sinonasal inflammation.     That he has minimal sinonasal symptoms at this time and his endoscopy is largely clear, I do not think it is necessary to yessi the management of any sinus disease at this point.  He will continue to monitor symptoms and reach out to us should he have any exacerbations, which I did educate him about.        Evelio Manning MD    [facility]  [facility]  [facility]  Department of Otolaryngology - Head & Neck Surgery

## 2023-06-09 NOTE — LETTER
6/9/2023       RE: Jaime Saucedo  [address] [facility] [address]     Dear Colleague,    Thank you for referring your patient, Jaime Saucedo, to the [facility] [place] at [facility]. Please see a copy of my visit note below.                         [facility]                     New Patient Visit      Encounter date: June 9, 2023    Referring Provider:   Evelio Saavedra PA-C  [address] [place] [id number]    Chief Complaint: Sinusitis    History of Present Illness: Jaime Saucedo is a pleasant 73-year-old gentleman who I am seeing for sinusitis.  He had sinusitis develop several months ago that would not resolve with several course of antibiotics and even steroids.  Eventually presented to the ER with shortness of breath and hypoxia on home O2 monitor.  He was given azithromycin, Augmentin and steroids.  Since then, the amount of nasal mucus production, shortness of breath, and cough have all improved.    He admits to baseline chronic nasal congestion most of his life.  He has protracted sinus infections that have not lasted longer than a month.    Review of systems: A 14-point review of systems has been conducted and was negative for any notable symptoms, except as dictated in the history of present illness.     Past Medical History:   Diagnosis Date    Hypertension     Obstructive sleep apnea syndrome 6/1/2011    Pneumonia due to 2019 novel coronavirus 3/26/2021        Past Surgical History:   Procedure Laterality Date    ARTHROSCOPY KNEE RT/LT Bilateral 2006    partial meniscectomy    PHACOEMULSIFICATION WITH STANDARD INTRAOCULAR LENS IMPLANT Left 3/6/2019    Procedure: Cataract Removal with Implant;  Surgeon: Johan Anthony MD;  Location: [facility] OR    PHACOEMULSIFICATION WITH STANDARD INTRAOCULAR LENS IMPLANT Right 4/28/2021    Procedure: Cataract extraction with implant.;   Surgeon: Johan Anthony MD;  Location: [facility] OR        Family History   Problem Relation Age of Onset    Hypertension Mother     Hypertension Father         Social History     Socioeconomic History    Marital status:    Tobacco Use    Smoking status: Never    Smokeless tobacco: Never   Vaping Use    Vaping status: Never Used     Passive vaping exposure: Yes   Substance and Sexual Activity    Alcohol use: Yes     Comment: rare    Drug use: No    Sexual activity: Yes     Partners: Female        Physical Exam:  Vital signs: There were no vitals taken for this visit.   General Appearance: No acute distress, appropriate demeanor, conversant  Eyes: moist conjunctivae; EOMI; pupils symmetric; visual acuity grossly intact; no proptosis  Head: normocephalic; overall symmetric appearance without deformity  Face: overall symmetric without deformity; HB I/VI  Ears: Normal appearance of external ear; external meatus normal in appearance; TMs intact without perforation bilaterally;   Nose: No external deformity; see nasal endoscopy  Oral Cavity/oropharynx: Normal appearance of mucosa; tongue midline; no mass or lesions; oropharynx without obvious mucosal abnormality  Neck: no palpable lymphadenopathy; thyroid without palpable nodules  Lungs: symmetric chest rise; no wheezing  CV: Good distal perfusion; normal heart rate  Extremities: No deformity  Neurologic Exam: Cranial nerves II-XII are grossly intact; no focal deficit      Procedure Note  Procedure performed: Rigid nasal endoscopy  Indication: To evaluate for sinonasal pathology not visualized on routine anterior rhinoscopy  Anesthesia: 4% topical lidocaine with 0.05% oxymetazoline  Description of procedure: A 30 degree, 3 mm rigid endoscope was inserted into bilateral nasal cavities and the nasal valve, nasal cavity, middle meatus, sphenoethmoid recess, and nasopharynx were thoroughly evaluated for evidence of obstruction, edema, purulence, polyps and/or  mass/lesion.     [place] Endoscopic Scoring System  Endoscopic observation Right Left   Polyps in middle meatus (0 = absent, 1 = restricted to middle meatus, 2 = Beyond middle meatus) 0 0   Discharge (0 = absent, 1 = thin and clear, 2 = thick, purulent) 0 1   Edema (0 = absent, 1 = mild-moderate, 2 = moderate-severe) 0 1   Crusting (0 = absent, 1 = mild-moderate, 2 = moderate-severe) 0 0   Scarring (0= absent, 1 = mild-moderate, 2 = moderate-severe) 0 0   Total 0 2     Findings  RT: MM and SER are clear  LT: Mild mucoid drainage from the middle meatus with mild edema    The patient tolerated the procedure well without complication.     Laboratory Review:  n/a    Imaging Review:  Reviewed CT sinus from April 10, 2023:    IMPRESSION:    1.  Diffuse sinusitis with severe mucosal thickening and/or sinus  opacification as detailed.  2.  Diffuse effacement of the paranasal sinus drainage pathways by  mucosal thickening.  3.  Partial opacification of bilateral mastoid and middle ear  Cavities.      Pathology Review:  n/a    Assessment/Medical Decision Making/Plan:  CRSsNP, with recent protracted episode with lower airway component  Nasal congestion  Hyposmia    Jaime is a 73-year-old gentleman with likely long history of chronic sinusitis.  He had a protracted episode of sinusitis that has since resolved with several courses of antibiotics.  He is currently feeling much better today.  He has minimal sinonasal symptoms and his nasal endoscopy is largely clear.  We discussed continued monitoring of his symptoms while he uses Flonase nasal spray to control any sinonasal inflammation.     That he has minimal sinonasal symptoms at this time and his endoscopy is largely clear, I do not think it is necessary to yessi the management of any sinus disease at this point.  He will continue to monitor symptoms and reach out to us should he have any exacerbations, which I did educate him about.        Evelio Manning,  MD    [facility]  [facility]  [facility]  Department of Otolaryngology - Head & Neck Surgery          Again, thank you for allowing me to participate in the care of your patient.      Sincerely,    Evelio Manning MD

## 2023-06-19 ENCOUNTER — PATIENT OUTREACH (OUTPATIENT)
Dept: FAMILY MEDICINE | Facility: CLINIC | Age: 73
End: 2023-06-19
Payer: COMMERCIAL

## 2023-06-19 NOTE — TELEPHONE ENCOUNTER
Patient Quality Outreach    Patient is due for the following:   Physical Annual Wellness Visit    Next Steps:   Patient declined follow up at this time.    Type of outreach:    Phone, spoke to patient/parent. Patient/parent will call back to schedule.      Questions for provider review:    None           Malorie Dorantes

## 2023-07-10 ENCOUNTER — OFFICE VISIT (OUTPATIENT)
Dept: URGENT CARE | Facility: URGENT CARE | Age: 73
End: 2023-07-10
Payer: COMMERCIAL

## 2023-07-10 VITALS
DIASTOLIC BLOOD PRESSURE: 79 MMHG | WEIGHT: 163 LBS | TEMPERATURE: 97.2 F | BODY MASS INDEX: 27.12 KG/M2 | OXYGEN SATURATION: 98 % | SYSTOLIC BLOOD PRESSURE: 132 MMHG | HEART RATE: 54 BPM

## 2023-07-10 DIAGNOSIS — L91.8 SKIN TAG: Primary | ICD-10-CM

## 2023-07-10 PROCEDURE — 99213 OFFICE O/P EST LOW 20 MIN: CPT | Performed by: PHYSICIAN ASSISTANT

## 2023-07-10 NOTE — PROGRESS NOTES
"  Assessment & Plan     Skin tag  Reassurance, no visible tick, just skin tag. Follow up as needed.                  BMI:   Estimated body mass index is 27.12 kg/m  as calculated from the following:    Height as of 6/9/23: 1.651 m (5' 5\").    Weight as of this encounter: 73.9 kg (163 lb).           Return in about 1 week (around 7/17/2023), or if symptoms worsen or fail to improve.    Amara Niño PA-C  [facility] [place]              Subjective   Chief Complaint   Patient presents with     Tick Bite     Going on for about 1 wk, on left side of groin. Not sure if it is a tick or not, tried pulling it off this morning, says it itches, used spray. Says he does have a problem with his eyes so can't see it very well.          HPI     Derm problem     Onset of symptoms was 1 week(s) ago.  Course of illness is same.    Severity mild  Current and Associated symptoms: itchy area left groin  Treatment measures tried include over the counter spray.  Predisposing factors include None.                  Review of Systems         Objective    /79   Pulse 54   Temp 97.2  F (36.2  C) (Tympanic)   Wt 73.9 kg (163 lb)   SpO2 98%   BMI 27.12 kg/m    Body mass index is 27.12 kg/m .  Physical Exam  Constitutional:       General: He is not in acute distress.  Genitourinary:         Comments: Small skin tag left groin crease, no signs of infection   Neurological:      Mental Status: He is alert.                            "

## 2023-07-21 ENCOUNTER — TRANSCRIBE ORDERS (OUTPATIENT)
Dept: OTHER | Age: 73
End: 2023-07-21

## 2023-07-21 DIAGNOSIS — M62.81 MUSCLE WEAKNESS: Primary | ICD-10-CM

## 2023-08-07 ENCOUNTER — HOSPITAL ENCOUNTER (OUTPATIENT)
Dept: OUTPATIENT PROCEDURES | Facility: CLINIC | Age: 73
Discharge: HOME | End: 2023-08-07
Attending: OPHTHALMOLOGY | Admitting: OPHTHALMOLOGY
Payer: COMMERCIAL

## 2023-08-07 PROCEDURE — 66821 AFTER CATARACT LASER SURGERY: CPT | Mod: RT

## 2023-08-31 DIAGNOSIS — I10 HYPERTENSION GOAL BP (BLOOD PRESSURE) < 140/90: ICD-10-CM

## 2023-08-31 NOTE — TELEPHONE ENCOUNTER
"Requested Prescriptions   Pending Prescriptions Disp Refills    losartan (COZAAR) 25 MG tablet 45 tablet 1       Angiotensin-II Receptors Failed - 8/31/2023 11:05 AM        Failed - Normal serum creatinine on file in past 12 months     Recent Labs   Lab Test 08/24/22  1203   CR 1.04       Ok to refill medication if creatinine is low          Failed - Normal serum potassium on file in past 12 months     Recent Labs   Lab Test 08/24/22  1203   POTASSIUM 4.2                    Passed - Last blood pressure under 140/90 in past 12 months     BP Readings from Last 3 Encounters:   07/10/23 132/79   06/09/23 135/82   06/06/23 110/62                 Passed - Recent (12 mo) or future (30 days) visit within the authorizing provider's specialty     Patient has had an office visit with the authorizing provider or a provider within the authorizing providers department within the previous 12 mos or has a future within next 30 days. See \"Patient Info\" tab in inbasket, or \"Choose Columns\" in Meds & Orders section of the refill encounter.              Passed - Medication is active on med list        Passed - Patient is age 18 or older            losartan (COZAAR) 25 MG tablet 45 tablet 1 3/14/2023  No   Sig: TAKE ONE-HALF TABLET (12.5MG) BY MOUTH ONCE DAILY   Sent to pharmacy as: Losartan Potassium 25 MG Oral Tablet (COZAAR)   Class: E-Prescribe   Order: [id number]   E-Prescribing Status: Receipt confirmed by pharmacy (3/14/2023 10:58 AM CDT   Date of last VV with Dr. Gautam 2/9/23, OV 6/18/20.    Julie Behrendt RN  "

## 2023-09-01 RX ORDER — LOSARTAN POTASSIUM 25 MG/1
12.5 TABLET ORAL DAILY
Qty: 45 TABLET | Refills: 0 | Status: SHIPPED | OUTPATIENT
Start: 2023-09-01 | End: 2023-10-18

## 2023-09-01 NOTE — TELEPHONE ENCOUNTER
Patient notified that he needs to make appointment for preventative and med check before any more refills will be approved.

## 2023-10-18 ENCOUNTER — OFFICE VISIT (OUTPATIENT)
Dept: FAMILY MEDICINE | Facility: CLINIC | Age: 73
End: 2023-10-18
Payer: COMMERCIAL

## 2023-10-18 VITALS
TEMPERATURE: 98.2 F | OXYGEN SATURATION: 98 % | HEIGHT: 65 IN | SYSTOLIC BLOOD PRESSURE: 118 MMHG | HEART RATE: 64 BPM | WEIGHT: 168.4 LBS | BODY MASS INDEX: 28.06 KG/M2 | RESPIRATION RATE: 16 BRPM | DIASTOLIC BLOOD PRESSURE: 78 MMHG

## 2023-10-18 DIAGNOSIS — Z00.00 ENCOUNTER FOR MEDICARE ANNUAL WELLNESS EXAM: Primary | ICD-10-CM

## 2023-10-18 DIAGNOSIS — I10 HYPERTENSION GOAL BP (BLOOD PRESSURE) < 140/90: ICD-10-CM

## 2023-10-18 DIAGNOSIS — G25.81 RESTLESS LEGS SYNDROME (RLS): ICD-10-CM

## 2023-10-18 DIAGNOSIS — E78.5 HYPERLIPIDEMIA LDL GOAL <130: ICD-10-CM

## 2023-10-18 DIAGNOSIS — Z13.9 ENCOUNTER FOR SCREENING INVOLVING SOCIAL DETERMINANTS OF HEALTH (SDOH): ICD-10-CM

## 2023-10-18 DIAGNOSIS — G20.B2 PARKINSON'S DISEASE WITH DYSKINESIA AND FLUCTUATING MANIFESTATIONS (H): ICD-10-CM

## 2023-10-18 PROCEDURE — G0439 PPPS, SUBSEQ VISIT: HCPCS | Performed by: FAMILY MEDICINE

## 2023-10-18 PROCEDURE — 99214 OFFICE O/P EST MOD 30 MIN: CPT | Mod: 25 | Performed by: FAMILY MEDICINE

## 2023-10-18 RX ORDER — PRAMIPEXOLE DIHYDROCHLORIDE 0.25 MG/1
0.25 TABLET ORAL 3 TIMES DAILY
Qty: 90 TABLET | Refills: 4 | Status: SHIPPED | OUTPATIENT
Start: 2023-10-18 | End: 2024-07-01

## 2023-10-18 RX ORDER — LOSARTAN POTASSIUM 25 MG/1
12.5 TABLET ORAL DAILY
Qty: 45 TABLET | Refills: 4 | Status: SHIPPED | OUTPATIENT
Start: 2023-10-18

## 2023-10-18 RX ORDER — SIMVASTATIN 40 MG
40 TABLET ORAL AT BEDTIME
Qty: 90 TABLET | Refills: 4 | Status: SHIPPED | OUTPATIENT
Start: 2023-10-18

## 2023-10-18 RX ORDER — RESPIRATORY SYNCYTIAL VIRUS VACCINE 120MCG/0.5
0.5 KIT INTRAMUSCULAR ONCE
Qty: 1 EACH | Refills: 0 | Status: CANCELLED | OUTPATIENT
Start: 2023-10-18 | End: 2023-10-18

## 2023-10-18 ASSESSMENT — ENCOUNTER SYMPTOMS
JOINT SWELLING: 0
EYE PAIN: 0
SHORTNESS OF BREATH: 0
NAUSEA: 0
ARTHRALGIAS: 1
MYALGIAS: 1
COUGH: 0
PALPITATIONS: 0
FEVER: 0
PARESTHESIAS: 0
CONSTIPATION: 0
HEMATURIA: 0
DYSURIA: 0
ABDOMINAL PAIN: 0
SORE THROAT: 0
DIZZINESS: 1
HEADACHES: 0
DIARRHEA: 0
CHILLS: 0
HEMATOCHEZIA: 0
WEAKNESS: 1
NERVOUS/ANXIOUS: 0
HEARTBURN: 0
FREQUENCY: 0

## 2023-10-18 ASSESSMENT — ACTIVITIES OF DAILY LIVING (ADL): CURRENT_FUNCTION: NO ASSISTANCE NEEDED

## 2023-10-18 ASSESSMENT — PAIN SCALES - GENERAL: PAINLEVEL: NO PAIN (0)

## 2023-10-18 NOTE — PATIENT INSTRUCTIONS
Patient Education   Personalized Prevention Plan  You are due for the preventive services outlined below.  Your care team is available to assist you in scheduling these services.  If you have already completed any of these items, please share that information with your care team to update in your medical record.  Health Maintenance Due   Topic Date Due     Zoster (Shingles) Vaccine (1 of 2) Never done     RSV VACCINE 60+ (1 - 1-dose 60+ series) Never done     Annual Wellness Visit  Never done     Pneumococcal Vaccine (1 - PCV) Never done     AORTIC ANEURYSM SCREENING (SYSTEM ASSIGNED)  Never done     Flu Vaccine (1) 09/01/2023     COVID-19 Vaccine (3 - 2023-24 season) 09/01/2023     Your Health Risk Assessment indicates you feel you are not in good health    A healthy lifestyle helps keep the body fit and the mind alert. It helps protect you from disease, helps you fight disease, and helps prevent chronic disease (disease that doesn't go away) from getting worse. This is important as you get older and begin to notice twinges in muscles and joints and a decline in the strength and stamina you once took for granted. A healthy lifestyle includes good healthcare, good nutrition, weight control, recreation, and regular exercise. Avoid harmful substances and do what you can to keep safe. Another part of a healthy lifestyle is stay mentally active and socially involved.    Good healthcare   Have a wellness visit every year.   If you have new symptoms, let us know right away. Don't wait until the next checkup.   Take medicines exactly as prescribed and keep your medicines in a safe place. Tell us if your medicine causes problems.   Healthy diet and weight control   Eat 3 or 4 small, nutritious, low-fat, high-fiber meals a day. Include a variety of fruits, vegetables, and whole-grain foods.   Make sure you get enough calcium in your diet. Calcium, vitamin D, and exercise help prevent osteoporosis (bone thinning).   If you  live alone, try eating with others when you can. That way you get a good meal and have company while you eat it.   Try to keep a healthy weight. If you eat more calories than your body uses for energy, it will be stored as fat and you will gain weight.     Recreation   Recreation is not limited to sports and team events. It includes any activity that provides relaxation, interest, enjoyment, and exercise. Recreation provides an outlet for physical, mental, and social energy. It can give a sense of worth and achievement. It can help you stay healthy.    Mental Exercise and Social Involvement  Mental and emotional health is as important as physical health. Keep in touch with friends and family. Stay as active as possible. Continue to learn and challenge yourself.   Things you can do to stay mentally active are:  Learn something new, like a foreign language or musical instrument.   Play SCRABBLE or do crossword puzzles. If you cannot find people to play these games with you at home, you can play them with others on your computer through the Internet.   Join a games club--anything from card games to chess or checkers or lawn bowling.   Start a new hobby.   Go back to school.   Volunteer.   Read.   Keep up with world events.  Learning About Dietary Guidelines  What are the Dietary Guidelines for Americans?     Dietary Guidelines for Americans provide tips for eating well and staying healthy. This helps reduce the risk for long-term (chronic) diseases.  These guidelines recommend that you:  Eat and drink the right amount for you. The U.S. government's food guide is called MyPlate. It can help you make your own well-balanced eating plan.  Try to balance your eating with your activity. This helps you stay at a healthy weight.  Drink alcohol in moderation, if at all.  Limit foods high in salt, saturated fat, trans fat, and added sugar.  These guidelines are from the U.S. Department of Agriculture and the U.S. Department of  "Health and Human Services. They are updated every 5 years.  What is MyPlate?  MyPlate is the U.S. government's food guide. It can help you make your own well-balanced eating plan. A balanced eating plan means that you eat enough, but not too much, and that your food gives you the nutrients you need to stay healthy.  MyPlate focuses on eating plenty of whole grains, fruits, and vegetables, and on limiting fat and sugar. It is available online at www.ChooseMyPlate.gov.  How can you get started?  If you're trying to eat healthier, you can slowly change your eating habits over time. You don't have to make big changes all at once. Start by adding one or two healthy foods to your meals each day.  Grains  Choose whole-grain breads and cereals and whole-wheat pasta and whole-grain crackers.  Vegetables  Eat a variety of vegetables every day. They have lots of nutrients and are part of a heart-healthy diet.  Fruits  Eat a variety of fruits every day. Fruits contain lots of nutrients. Choose fresh fruit instead of fruit juice.  Protein foods  Choose fish and lean poultry more often. Eat red meat and fried meats less often. Dried beans, tofu, and nuts are also good sources of protein.  Dairy  Choose low-fat or fat-free products from this food group. If you have problems digesting milk, try eating cheese or yogurt instead.  Fats and oils  Limit fats and oils if you're trying to cut calories. Choose healthy fats when you cook. These include canola oil and olive oil.  Where can you learn more?  Go to https://www.[employer].net/patiented  Enter D676 in the search box to learn more about \"Learning About Dietary Guidelines.\"  Current as of: March 1, 2023               Content Version: 13.7    3574-1124 [employer].   Care instructions adapted under license by your healthcare professional. If you have questions about a medical condition or this instruction, always ask your healthcare professional. Healthwise, " Incorporated disclaims any warranty or liability for your use of this information.      Hearing Loss: Care Instructions  Overview     Hearing loss is a sudden or slow decrease in how well you hear. It can range from slight to profound. Permanent hearing loss can occur with aging. It also can happen when you are exposed long-term to loud noise. Examples include listening to loud music, riding motorcycles, or being around other loud machines.  Hearing loss can affect your work and home life. It can make you feel lonely or depressed. You may feel that you have lost your independence. But hearing aids and other devices can help you hear better and feel connected to others.  Follow-up care is a key part of your treatment and safety. Be sure to make and go to all appointments, and call your doctor if you are having problems. It's also a good idea to know your test results and keep a list of the medicines you take.  How can you care for yourself at home?  Avoid loud noises whenever possible. This helps keep your hearing from getting worse.  Always wear hearing protection around loud noises.  Wear a hearing aid as directed.  A professional can help you pick a hearing aid that will work best for you.  You can also get hearing aids over the counter for mild to moderate hearing loss.  Have hearing tests as your doctor suggests. They can show whether your hearing has changed. Your hearing aid may need to be adjusted.  Use other devices as needed. These may include:  Telephone amplifiers and hearing aids that can connect to a television, stereo, radio, or microphone.  Devices that use lights or vibrations. These alert you to the doorbell, a ringing telephone, or a baby monitor.  Television closed-captioning. This shows the words at the bottom of the screen. Most new TVs can do this.  TTY (text telephone). This lets you type messages back and forth on the telephone instead of talking or listening. These devices are also called  "TDD. When messages are typed on the keyboard, they are sent over the phone line to a receiving TTY. The message is shown on a monitor.  Use text messaging, social media, and email if it is hard for you to communicate by telephone.  Try to learn a listening technique called speechreading. It is not lipreading. You pay attention to people's gestures, expressions, posture, and tone of voice. These clues can help you understand what a person is saying. Face the person you are talking to, and have them face you. Make sure the lighting is good. You need to see the other person's face clearly.  Think about counseling if you need help to adjust to your hearing loss.  When should you call for help?  Watch closely for changes in your health, and be sure to contact your doctor if:    You think your hearing is getting worse.     You have new symptoms, such as dizziness or nausea.   Where can you learn more?  Go to https://www.[employer].Smallknot/patiented  Enter R798 in the search box to learn more about \"Hearing Loss: Care Instructions.\"  Current as of: March 1, 2023               Content Version: 13.7    5161-7929 [employer].   Care instructions adapted under license by your healthcare professional. If you have questions about a medical condition or this instruction, always ask your healthcare professional. [employer] disclaims any warranty or liability for your use of this information.      Your Health Risk Assessment indicates you feel you are not in good emotional health.    Recreation   Recreation is not limited to sports and team events. It includes any activity that provides relaxation, interest, enjoyment, and exercise. Recreation provides an outlet for physical, mental, and social energy. It can give a sense of worth and achievement. It can help you stay healthy.    Mental Exercise and Social Involvement  Mental and emotional health is as important as physical health. Keep in touch with friends " and family. Stay as active as possible. Continue to learn and challenge yourself.   Things you can do to stay mentally active are:  Learn something new, like a foreign language or musical instrument.   Play SCRABBLE or do crossword puzzles. If you cannot find people to play these games with you at home, you can play them with others on your computer through the Internet.   Join a games club--anything from card games to chess or checkers or lawn bowling.   Start a new hobby.   Go back to school.   Volunteer.   Read.   Keep up with world events.

## 2023-10-18 NOTE — COMMUNITY RESOURCES LIST (ENGLISH)
10/18/2023    [employer]  N/A  For questions about this resource list or additional care needs, please contact your primary care clinic or care manager.  Phone: [phone]   Email: N/A   Address: [address] [place] [address]   Hours: N/A        Financial Stability       Utility payment assistance  1  [place] [place] [facility] Office - Energy Assistance Program Distance: 12.03 miles      Phone/Virtual   [address] [place] [address]  Language: English  Hours: Mon - Fri 8:00 AM - 4:30 PM  Fees: Free   Phone: (808) 657-3095 Email: patricia@[facility] Website: https://www.[facility]/agency-information     2  [employer] Distance: 12.54 miles      In-Person, Phone/Virtual   [address] [place] [address]  Language: English  Hours: Mon - Sun Appt. Only  Fees: Free   Phone: (823) 816-4892 Email: brittany@[employer] Website: http://www.communityhelpinghand.org          Important Numbers & Websites       Emergency Services   911  [facility] Services   311  Poison Control   (101) 809-4181  Suicide Prevention Lifeline   (554) 252-8350 (TALK)  Child Abuse Hotline   (348) 565-9928 (4-A-Child)  Sexual Assault Hotline   (904) 361-5532 (HOPE)  National Runaway Safeline   (886) 863-1675 (RUNAWAY)  All-Options Talkline   (636) 546-7982  Substance Abuse Referral   (114) 345-1430 (HELP)

## 2023-10-18 NOTE — PROGRESS NOTES
"SUBJECTIVE:   Jaime is a 73 year old who presents for Preventive Visit.      6/6/2023    11:10 AM   Additional Questions   Roomed by Nilda       Are you in the first 12 months of your Medicare coverage?  No    Healthy Habits:     In general, how would you rate your overall health?  Fair    Frequency of exercise:  6-7 days/week    Duration of exercise:  30-45 minutes    Do you usually eat at least 4 servings of fruit and vegetables a day, include whole grains    & fiber and avoid regularly eating high fat or \"junk\" foods?  No    Taking medications regularly:  Yes    Medication side effects:  Muscle aches and Other    Ability to successfully perform activities of daily living:  No assistance needed    Home Safety:  No safety concerns identified    Hearing Impairment:  Difficulty following a conversation in a noisy restaurant or crowded room    In the past 6 months, have you been bothered by leaking of urine?  No    In general, how would you rate your overall mental or emotional health?  Fair      Today's PHQ-2 Score:       10/18/2023    10:21 AM   PHQ-2 ( 1999 [employer])   Q1: Little interest or pleasure in doing things 0   Q2: Feeling down, depressed or hopeless 0   PHQ-2 Score 0   Q1: Little interest or pleasure in doing things Not at all   Q2: Feeling down, depressed or hopeless Not at all   PHQ-2 Score 0           Have you ever done Advance Care Planning? (For example, a Health Directive, POLST, or a discussion with a medical provider or your loved ones about your wishes): Yes, advance care planning is on file.       Fall risk  Fallen 2 or more times in the past year?: No  Any fall with injury in the past year?: No    Cognitive Screening   1) Repeat 3 items (Leader, Season, Table)    2) Clock draw: NORMAL  3) 3 item recall: Recalls 3 objects  Results: 3 items recalled: COGNITIVE IMPAIRMENT LESS LIKELY    Mini-CogTM Copyright S Sophia. Licensed by the author for use in [facility]; reprinted with " permission ([email or web address]). All rights reserved.          Reviewed and updated as needed this visit by clinical staff   Tobacco  Allergies  Meds  Problems  Med Hx  Surg Hx  Fam Hx          Reviewed and updated as needed this visit by Provider   Tobacco  Allergies  Meds  Problems  Med Hx  Surg Hx  Fam Hx         Social History     Tobacco Use     Smoking status: Never     Smokeless tobacco: Never   Substance Use Topics     Alcohol use: Yes     Comment: rare             10/18/2023    10:21 AM   Alcohol Use   Prescreen: >3 drinks/day or >7 drinks/week? No     Do you have a current opioid prescription? No  Do you use any other controlled substances or medications that are not prescribed by a provider? None          Hyperlipidemia Follow-Up    Are you regularly taking any medication or supplement to lower your cholesterol?   Yes- simvastatin  Are you having muscle aches or other side effects that you think could be caused by your cholesterol lowering medication?  No    Hypertension Follow-up    Do you check your blood pressure regularly outside of the clinic? No   Are you following a low salt diet? No  Are your blood pressures ever more than 140 on the top number (systolic) OR more   than 90 on the bottom number (diastolic), for example 140/90? No    Current providers sharing in care for this patient include:   Patient Care Team:  Adalberto Gautam MD as PCP - General (Family Medicine)  Johan Israel MD as MD (Dermatology)  Sal Waterman MD as MD (Otolaryngology)  Luma Arias APRN CNP as Assigned PCP  Evelio Manning MD as Assigned Surgical Provider  Nigel Page MD as MD (Neurology)    The following health maintenance items are reviewed in Epic and correct as of today:  Health Maintenance   Topic Date Due     ZOSTER IMMUNIZATION (1 of 2) Never done     RSV VACCINE 60+ (1 - 1-dose 60+ series) Never done     MEDICARE ANNUAL WELLNESS VISIT  Never done     Pneumococcal Vaccine: 65+  Years (1 - PCV) Never done     AORTIC ANEURYSM SCREENING (SYSTEM ASSIGNED)  Never done     INFLUENZA VACCINE (1) 09/01/2023     COVID-19 Vaccine (3 - 2023-24 season) 09/01/2023     ANNUAL REVIEW OF HM ORDERS  02/09/2024     FALL RISK ASSESSMENT  10/18/2024     COLORECTAL CANCER SCREENING  03/29/2026     LIPID  03/08/2027     ADVANCE CARE PLANNING  10/18/2028     DTAP/TDAP/TD IMMUNIZATION (2 - Td or Tdap) 02/12/2030     HEPATITIS C SCREENING  Completed     PHQ-2 (once per calendar year)  Completed     IPV IMMUNIZATION  Aged Out     HPV IMMUNIZATION  Aged Out     MENINGITIS IMMUNIZATION  Aged Out     Labs reviewed in EPIC  Patient Active Problem List   Diagnosis     Hypertension goal BP (blood pressure) < 140/90     Diplopia     Vertigo     Hyperlipidemia LDL goal <130     Restless legs syndrome (RLS)     Obstructive sleep apnea syndrome     Parkinson's disease with dyskinesia and fluctuating manifestations     Past Surgical History:   Procedure Laterality Date     ARTHROSCOPY KNEE RT/LT Bilateral 2006    partial meniscectomy     PHACOEMULSIFICATION WITH STANDARD INTRAOCULAR LENS IMPLANT Left 3/6/2019    Procedure: Cataract Removal with Implant;  Surgeon: Johan Anthony MD;  Location: [facility]     PHACOEMULSIFICATION WITH STANDARD INTRAOCULAR LENS IMPLANT Right 4/28/2021    Procedure: Cataract extraction with implant.;  Surgeon: Johan Anthony MD;  Location: [facility]       Social History     Tobacco Use     Smoking status: Never     Smokeless tobacco: Never   Substance Use Topics     Alcohol use: Yes     Comment: rare     Family History   Problem Relation Age of Onset     Hypertension Mother      Hypertension Father          Current Outpatient Medications   Medication Sig Dispense Refill     losartan (COZAAR) 25 MG tablet Take 0.5 tablets (12.5 mg) by mouth daily 45 tablet 4     pramipexole (MIRAPEX) 0.25 MG tablet Take 1 tablet (0.25 mg) by mouth 3 times daily 90 tablet 4     sildenafil (REVATIO) 20 MG  "tablet 1-2 tablets 1 hour prior to sexual intercourse. 10 tablet 11     simvastatin (ZOCOR) 40 MG tablet Take 1 tablet (40 mg) by mouth at bedtime 90 tablet 4     No Known Allergies          Review of Systems   Constitutional:  Negative for chills and fever.   HENT:  Negative for congestion, ear pain, hearing loss and sore throat.    Eyes:  Positive for visual disturbance. Negative for pain.   Respiratory:  Negative for cough and shortness of breath.    Cardiovascular:  Negative for chest pain, palpitations and peripheral edema.   Gastrointestinal:  Negative for abdominal pain, constipation, diarrhea, heartburn, hematochezia and nausea.   Genitourinary:  Negative for dysuria, frequency, genital sores, hematuria, impotence, penile discharge and urgency.   Musculoskeletal:  Positive for arthralgias and myalgias. Negative for joint swelling.   Skin:  Positive for rash.   Neurological:  Positive for dizziness and weakness. Negative for headaches and paresthesias.   Psychiatric/Behavioral:  Negative for mood changes. The patient is not nervous/anxious.          OBJECTIVE:   /78   Pulse 64   Temp 98.2  F (36.8  C) (Tympanic)   Resp 16   Ht 1.651 m (5' 5\")   Wt 76.4 kg (168 lb 6.4 oz)   SpO2 98%   BMI 28.02 kg/m   Estimated body mass index is 28.02 kg/m  as calculated from the following:    Height as of this encounter: 1.651 m (5' 5\").    Weight as of this encounter: 76.4 kg (168 lb 6.4 oz).  Physical Exam  GENERAL: healthy, alert and no distress  EYES: Eyes grossly normal to inspection, PERRL and conjunctivae and sclerae normal  HENT: ear canals and TM's normal, nose and mouth without ulcers or lesions  NECK: no adenopathy, no asymmetry, masses, or scars and thyroid normal to palpation  RESP: lungs clear to auscultation - no rales, rhonchi or wheezes  CV: regular rate and rhythm, normal S1 S2, no S3 or S4, no murmur, click or rub, no peripheral edema and peripheral pulses strong  ABDOMEN: soft, nontender, no " "hepatosplenomegaly, no masses and bowel sounds normal  MS: no gross musculoskeletal defects noted, no edema  SKIN: no suspicious lesions or rashes  NEURO: Normal strength and tone, tremor resting tremor in right leg.  Shuffling gait., sensory exam grossly normal, and mentation intact  PSYCH: mentation appears normal, affect normal/bright    Diagnostic Test Results:  Labs reviewed in Epic    ASSESSMENT / PLAN:   Encounter for Medicare annual wellness exam    Parkinson's disease with dyskinesia and fluctuating manifestations  Unfortunately his neurologist left his current practice and he needs to establish new neurology care.  Plan with previous neurologist was to gradually increase Mirapex as this had been helping with restless leg syndrome as well at his Parkinson's tremor.  We will continue titrating up to 0.25 mg 3 times daily.  And referral was placed for neurology consult.  - Physical Therapy Referral; Future    Hypertension goal BP (blood pressure) < 140/90  Well controlled. Refilled medication.     - losartan (COZAAR) 25 MG tablet; Take 0.5 tablets (12.5 mg) by mouth daily    Restless legs syndrome (RLS)  See above.  - pramipexole (MIRAPEX) 0.25 MG tablet; Take 1 tablet (0.25 mg) by mouth 3 times daily    Hyperlipidemia LDL goal <130  Well controlled. Refilled medication.     - simvastatin (ZOCOR) 40 MG tablet; Take 1 tablet (40 mg) by mouth at bedtime    Encounter for screening involving social determinants of health (SDoH)        Patient has been advised of split billing requirements and indicates understanding: Yes      COUNSELING:  Reviewed preventive health counseling, as reflected in patient instructions      BMI:   Estimated body mass index is 28.02 kg/m  as calculated from the following:    Height as of this encounter: 1.651 m (5' 5\").    Weight as of this encounter: 76.4 kg (168 lb 6.4 oz).         He reports that he has never smoked. He has never used smokeless tobacco.      Appropriate preventive " services were discussed with this patient, including applicable screening as appropriate for fall prevention, nutrition, physical activity, Tobacco-use cessation, weight loss and cognition.  Checklist reviewing preventive services available has been given to the patient.    Reviewed patients plan of care and provided an AVS. The Intermediate Care Plan ( asthma action plan, low back pain action plan, and migraine action plan) for Jaime meets the Care Plan requirement. This Care Plan has been established and reviewed with the Patient.          Adalberto Gautam MD  [facility]    Identified Health Risks:  I have reviewed Opioid Use Disorder and Substance Use Disorder risk factors and made any needed referrals.

## 2023-10-18 NOTE — COMMUNITY RESOURCES LIST (ENGLISH)
10/18/2023   [facility] - Outpatient Clinics  N/A  For additional resource needs, please contact your health insurance member services or your primary care team.  Phone: [phone]   Email: N/A   Address: [facility] [place] [address]   Hours: N/A        Financial Stability       Utility payment assistance  1  [place] [employer] Commission - [place]'s Telephone Assistance Plan (TAP) and Federal Lifeline and Affordable Connectivity Program (ACP) Distance: 30.33 miles      Phone/Virtual   [address] [place] [address]  Language: English  Fees: Free   Phone: (904) 120-4377 Email: consumer.puc@[facility].mn. Website: https://[place].gov/puc/consumers/telephone/     2  [place] [employer] - Energy and Utilities Distance: 33.93 miles      In-Person, Phone/Virtual   [address] [place] [address]  Language: English  Hours: Mon - Fri 8:30 AM - 4:30 PM  Fees: Free   Phone: (930) 953-1088 Website: https://[place].gov/[employer]/energy/consumer-assistance/energy-assistance-program/          Important Numbers & Websites       [facility]   [address] 211[address].org  Poison Control   (462) 910-8609 [place]po[place]  Suicide and Crisis Lifeline   [address] 8[facility] Health Systemsline.org  Childhelp [place] Child Abuse Hotline   289.131.7526 Childhelphotline.org  National Sexual Assault Hotline   (721) 508-2624 (HOPE) Rainn.org  National Runaway Safeline   (138) 983-1623 (RUNAWAY) 1800runaway.org  Pregnancy & Postpartum Support [place]   Call/text 706-078-5678 Ppsupport[place].org  Substance Abuse National Helpline ([facility]   872-879-HELP (2262) Findtreatment.gov  Emergency Services   911

## 2023-10-18 NOTE — PROGRESS NOTES
"The patient was provided with suggestions to help him develop a healthy physical lifestyle.  The patient was counseled and encouraged to consider modifying their diet and eating habits. He was provided with information on recommended healthy diet options.  The patient was provided with written information regarding signs of hearing loss.  The patient was provided with suggestions to help him develop a healthy emotional lifestyle.  Answers submitted by the patient for this visit:  Annual Preventive Visit (Submitted on 10/18/2023)  Chief Complaint: Annual Exam:  In general, how would you rate your overall physical health?: fair  Frequency of exercise:: 6-7 days/week  Do you usually eat at least 4 servings of fruit and vegetables a day, include whole grains & fiber, and avoid regularly eating high fat or \"junk\" foods? : No  Taking medications regularly:: Yes  Medication side effects:: Muscle aches, Other  Activities of Daily Living: no assistance needed  Home safety: no safety concerns identified  Hearing Impairment:: difficulty following a conversation in a noisy restaurant or crowded room  In the past 6 months, have you been bothered by leaking of urine?: No  abdominal pain: No  Blood in stool: No  Blood in urine: No  chest pain: No  chills: No  congestion: No  constipation: No  cough: No  diarrhea: No  dizziness: Yes  ear pain: No  eye pain: No  nervous/anxious: No  fever: No  frequency: No  genital sores: No  headaches: No  hearing loss: No  heartburn: No  arthralgias: Yes  joint swelling: No  peripheral edema: No  mood changes: No  myalgias: Yes  nausea: No  dysuria: No  palpitations: No  Skin sensation changes: No  sore throat: No  urgency: No  rash: Yes  shortness of breath: No  visual disturbance: Yes  weakness: Yes  impotence: No  penile discharge: No  In general, how would you rate your overall mental or emotional health?: fair  Exercise outside of work (Submitted on 10/18/2023)  Chief Complaint: Annual " Exam:  Duration of exercise:: 30-45 minutes

## 2023-10-18 NOTE — COMMUNITY RESOURCES LIST (ENGLISH)
10/18/2023   [facility] - Outpatient Clinics  N/A  For additional resource needs, please contact your health insurance member services or your primary care team.  Phone: [phone]   Email: N/A   Address: [facility] [place] [address]   Hours: N/A        Financial Stability       Utility payment assistance  1  [place] "[employer] Commission - [place]'s Telephone Assistance Plan (TAP) and Federal Lifeline and Affordable Connectivity Program (ACP) Distance: 30.33 miles      Phone/Virtual   [address] [place] [address]  Language: English  Fees: Free   Phone: (580) 701-6007 Email: consumer.puc@[facility].mn. Website: https://[place].gov/puc/consumers/telephone/     2  [place] [employer] - Energy and Utilities Distance: 33.93 miles      In-Person, Phone/Virtual   [address] [place] [address]  Language: English  Hours: Mon - Fri 8:30 AM - 4:30 PM  Fees: Free   Phone: (656) 694-8407 Website: https://[place].gov/[employer]/energy/consumer-assistance/energy-assistance-program/          Important Numbers & Websites       [facility]   211 211itedway.org  Poison Control   (834) 192-6456 [place]poison.org  Suicide and Crisis Lifeline   988 8[facility].org  Childhelp [place] Child Abuse Hotline   788.789.8626 Childhelphotline.org  National Sexual Assault Hotline   (194) 309-8316 (HOPE) Rainn.org  National Runaway Safeline   (382) 957-7223 (RUNAWAY) 1800runaway.org  Pregnancy & Postpartum Support [place]   Call/text 266-671-3990 Ppsupportmn.org  Substance Abuse National Helpline ([facility]   479-614-HELP (6123) Findtreatment.gov  Emergency Services   911

## 2023-10-25 PROBLEM — G20.B2 PARKINSON'S DISEASE WITH DYSKINESIA AND FLUCTUATING MANIFESTATIONS (H): Status: ACTIVE | Noted: 2023-10-25

## 2023-10-31 ENCOUNTER — THERAPY VISIT (OUTPATIENT)
Dept: PHYSICAL THERAPY | Facility: CLINIC | Age: 73
End: 2023-10-31
Attending: FAMILY MEDICINE
Payer: COMMERCIAL

## 2023-10-31 DIAGNOSIS — G20.B2 PARKINSON'S DISEASE WITH DYSKINESIA AND FLUCTUATING MANIFESTATIONS (H): ICD-10-CM

## 2023-10-31 PROCEDURE — 97110 THERAPEUTIC EXERCISES: CPT | Mod: GP | Performed by: PHYSICAL THERAPIST

## 2023-10-31 PROCEDURE — 97161 PT EVAL LOW COMPLEX 20 MIN: CPT | Mod: GP | Performed by: PHYSICAL THERAPIST

## 2023-10-31 NOTE — PROGRESS NOTES
"PHYSICAL THERAPY EVALUATION  Type of Visit: Evaluation    See electronic medical record for Abuse and Falls Screening details.    Subjective       Presenting condition or subjective complaint:  R side LE tremor; muscle weakness; diagnosed with PD ~1 yr ago. Has appt with new neurologist in 2024  Date of onset: 10/18/23 (date of PT order)    Relevant medical history:   dizzinss, HTN; OA; pain at night \"restless leg syndrome\"; weakness; double vision s/p bilateral eye procedures to treat retina issue  Dates & types of surgery:      Prior diagnostic imaging/testing results:     EMG  Prior therapy history for the same diagnosis, illness or injury:    no    Prior Level of Function  Independent with some difficulty     Living Environment  Social support:     Type of home:   multi level  Stairs to enter the home:   no      Stairs inside the home:   yes      Employment:   no    Hobbies/Interests:  sports    Patient goals for therapy: get stronger     Pain assessment: LEs some LBP      Objective   Cognitive Status Examination  Orientation: Oriented to person, place and time   OBSERVATION: R LE tremor at rest  POSTURE: post pelvic tilt, flat back, rounded shoulders, forward head  RANGE OF MOTION: forward bend to knees only; UE and LEs limited due to stiffness but WFL  STRENGTH: decreased overall but WFL    BED MOBILITY: independent uses momentum to move    TRANSFERS: independent     GAIT:  stride length, minimal pelvic rotation; minimal arm swing R<L  Level of [place]: Independent  Assistive Device(s): None  Gait Distance: community distances  Stairs: uses hand rail reciprocal pattern     BALANCE: will be assessed at next treatment  MUSCLE TONE: tremor R LE; stiffness with movement     Assessment & Plan   CLINICAL IMPRESSIONS  Medical Diagnosis: Parkinson's disease    Treatment Diagnosis: Parkinson's disease   Impression/Assessment: Patient is a 73 year old male with muscle weakness and tremor R LE " complaints.  The following significant findings have been identified: Decreased ROM/flexibility, Decreased strength, Impaired balance, Impaired posture, and Impaired vision. These impairments interfere with their ability to perform self care tasks, recreational activities, and community mobility as compared to previous level of function.     Clinical Decision Making (Complexity):  Clinical Presentation: Stable/Uncomplicated  Clinical Presentation Rationale: based on medical and personal factors listed in PT evaluation  Clinical Decision Making (Complexity): Moderate complexity    PLAN OF CARE  Treatment Interventions:  Interventions: Neuromuscular Re-education, Therapeutic Activity, Therapeutic Exercise, Self-Care/Home Management    Long Term Goals     PT Goal 1  Goal Description: Independent HEP to continue stretching and strengthening on own  Rationale: to maximize safety and independence with performance of ADLs and functional tasks  Target Date: 01/28/24  PT Goal 2  Goal Description: improved balance demonstrated by 20% improvement in scores on balance assessments  Rationale: to maximize safety and independence with performance of ADLs and functional tasks  Target Date: 01/28/24      Frequency of Treatment: 1x/wk x 4 weeks then 4x/wk x 4 more weeks for LSTV BIG protocol  Duration of Treatment: 90 days    Education Assessment:   Learner/Method: Patient;Listening;Reading;Demonstration;Pictures/Video  Education Comments: PTRx    Risks and benefits of evaluation/treatment have been explained.   Patient/Family/caregiver agrees with Plan of Care.     Evaluation Time:     PT Eval, Low Complexity Minutes (24220): 20     Signing Clinician: Ivonne Kumar, PT [facility]                                                                                   OUTPATIENT PHYSICAL THERAPY      PLAN OF TREATMENT FOR OUTPATIENT REHABILITATION   Patient's Last Name, First Name,  Jaime Munoz YOB: 1950   Provider's Name   [facility]   Medical Record No.  [id number]     Onset Date: 10/18/23 (date of PT order)  Start of Care Date: 10/31/23     Medical Diagnosis:  Parkinson's disease      PT Treatment Diagnosis:  Parkinson's disease Plan of Treatment  Frequency/Duration: 1x/wk x 4 weeks then 4x/wk x 4 more weeks for LSTV BIG protocol/ 90 days    Certification date from 10/31/23 to 01/28/24         See note for plan of treatment details and functional goals     Ivonne Kumar, PT DPT                         I CERTIFY THE NEED FOR THESE SERVICES FURNISHED UNDER        THIS PLAN OF TREATMENT AND WHILE UNDER MY CARE     (Physician attestation of this document indicates review and certification of the therapy plan).                Referring Provider:  Adalberto Gautam      Initial Assessment  See Epic Evaluation- Start of Care Date: 10/31/23

## 2023-11-07 ENCOUNTER — THERAPY VISIT (OUTPATIENT)
Dept: PHYSICAL THERAPY | Facility: CLINIC | Age: 73
End: 2023-11-07
Attending: FAMILY MEDICINE
Payer: COMMERCIAL

## 2023-11-07 DIAGNOSIS — G20.B2 PARKINSON'S DISEASE WITH DYSKINESIA AND FLUCTUATING MANIFESTATIONS (H): Primary | ICD-10-CM

## 2023-11-07 PROCEDURE — 97110 THERAPEUTIC EXERCISES: CPT | Mod: GP | Performed by: PHYSICAL THERAPIST

## 2023-11-07 PROCEDURE — 97530 THERAPEUTIC ACTIVITIES: CPT | Mod: GP | Performed by: PHYSICAL THERAPIST

## 2023-11-15 ENCOUNTER — THERAPY VISIT (OUTPATIENT)
Dept: PHYSICAL THERAPY | Facility: CLINIC | Age: 73
End: 2023-11-15
Attending: FAMILY MEDICINE
Payer: COMMERCIAL

## 2023-11-15 DIAGNOSIS — G20.B2 PARKINSON'S DISEASE WITH DYSKINESIA AND FLUCTUATING MANIFESTATIONS (H): Primary | ICD-10-CM

## 2023-11-15 PROCEDURE — 97530 THERAPEUTIC ACTIVITIES: CPT | Mod: GP | Performed by: PHYSICAL THERAPIST

## 2023-11-21 NOTE — PROGRESS NOTES
LSVT BIG Assessment tasks Initial date:  11/15/23 Final date:             #1  Sit to Stand   5 times Sit to Stand  Initial:  7.8 sec     Final:  not available did not complete plan of care or final assessment   30 second Sit to Stand Initial:  17 sec     Final not available did not complete plan of care or final assessment  #2 Gait   Functional Gait Assessment:  22/30      10 Meter Walk gait velocity Initial:  1.74 m/sec     Final: not available did not complete plan of care or final assessment   TUG  Initial:  6.64 sec              Final:  not available did not complete plan of care or final assessment   TUG Carry  Initial:   8.04 sec  carry R hand           Final:  not available did not complete plan of care or final assessment    TUG Cognitive  Initial:  8.09  count backward      Final: not available did not complete plan of care or final assessment   Video record  gait: unable to keep reciprocal/contralateral arm swing  #3 Balance   Mini BEST  Initial:  15/28           Final:  not available did not complete plan of care or final assessment   #5 Other   Activity specific Balance Confidence Scale  Initial:  26% conficent                  Final not available did not complete plan of care or final assessment

## 2023-12-05 ENCOUNTER — THERAPY VISIT (OUTPATIENT)
Dept: PHYSICAL THERAPY | Facility: CLINIC | Age: 73
End: 2023-12-05
Attending: FAMILY MEDICINE
Payer: COMMERCIAL

## 2023-12-05 DIAGNOSIS — G20.B2 PARKINSON'S DISEASE WITH DYSKINESIA AND FLUCTUATING MANIFESTATIONS (H): Primary | ICD-10-CM

## 2023-12-05 PROCEDURE — 97110 THERAPEUTIC EXERCISES: CPT | Mod: GP | Performed by: PHYSICAL THERAPIST

## 2023-12-06 ENCOUNTER — THERAPY VISIT (OUTPATIENT)
Dept: PHYSICAL THERAPY | Facility: CLINIC | Age: 73
End: 2023-12-06
Attending: FAMILY MEDICINE
Payer: COMMERCIAL

## 2023-12-06 DIAGNOSIS — G20.B2 PARKINSON'S DISEASE WITH DYSKINESIA AND FLUCTUATING MANIFESTATIONS (H): Primary | ICD-10-CM

## 2023-12-06 PROCEDURE — 97116 GAIT TRAINING THERAPY: CPT | Mod: GP | Performed by: PHYSICAL THERAPIST

## 2023-12-06 PROCEDURE — 97110 THERAPEUTIC EXERCISES: CPT | Mod: GP | Performed by: PHYSICAL THERAPIST

## 2023-12-06 PROCEDURE — 97530 THERAPEUTIC ACTIVITIES: CPT | Mod: GP | Performed by: PHYSICAL THERAPIST

## 2023-12-07 ENCOUNTER — THERAPY VISIT (OUTPATIENT)
Dept: PHYSICAL THERAPY | Facility: CLINIC | Age: 73
End: 2023-12-07
Attending: FAMILY MEDICINE
Payer: COMMERCIAL

## 2023-12-07 DIAGNOSIS — G20.B2 PARKINSON'S DISEASE WITH DYSKINESIA AND FLUCTUATING MANIFESTATIONS (H): Primary | ICD-10-CM

## 2023-12-07 PROCEDURE — 97530 THERAPEUTIC ACTIVITIES: CPT | Mod: GP | Performed by: PHYSICAL THERAPIST

## 2023-12-07 PROCEDURE — 97110 THERAPEUTIC EXERCISES: CPT | Mod: GP | Performed by: PHYSICAL THERAPIST

## 2023-12-08 ENCOUNTER — THERAPY VISIT (OUTPATIENT)
Dept: PHYSICAL THERAPY | Facility: CLINIC | Age: 73
End: 2023-12-08
Attending: FAMILY MEDICINE
Payer: COMMERCIAL

## 2023-12-08 DIAGNOSIS — G20.B2 PARKINSON'S DISEASE WITH DYSKINESIA AND FLUCTUATING MANIFESTATIONS (H): Primary | ICD-10-CM

## 2023-12-08 PROCEDURE — 97110 THERAPEUTIC EXERCISES: CPT | Mod: GP | Performed by: PHYSICAL THERAPIST

## 2023-12-08 PROCEDURE — 97530 THERAPEUTIC ACTIVITIES: CPT | Mod: GP | Performed by: PHYSICAL THERAPIST

## 2023-12-12 ENCOUNTER — THERAPY VISIT (OUTPATIENT)
Dept: PHYSICAL THERAPY | Facility: CLINIC | Age: 73
End: 2023-12-12
Attending: FAMILY MEDICINE
Payer: COMMERCIAL

## 2023-12-12 DIAGNOSIS — G20.B2 PARKINSON'S DISEASE WITH DYSKINESIA AND FLUCTUATING MANIFESTATIONS (H): Primary | ICD-10-CM

## 2023-12-12 PROCEDURE — 97110 THERAPEUTIC EXERCISES: CPT | Mod: GP | Performed by: PHYSICAL THERAPIST

## 2023-12-12 PROCEDURE — 97530 THERAPEUTIC ACTIVITIES: CPT | Mod: GP | Performed by: PHYSICAL THERAPIST

## 2023-12-13 ENCOUNTER — THERAPY VISIT (OUTPATIENT)
Dept: PHYSICAL THERAPY | Facility: CLINIC | Age: 73
End: 2023-12-13
Attending: FAMILY MEDICINE
Payer: COMMERCIAL

## 2023-12-13 DIAGNOSIS — G20.B2 PARKINSON'S DISEASE WITH DYSKINESIA AND FLUCTUATING MANIFESTATIONS (H): Primary | ICD-10-CM

## 2023-12-13 PROCEDURE — 97116 GAIT TRAINING THERAPY: CPT | Mod: GP | Performed by: PHYSICAL THERAPIST

## 2023-12-13 PROCEDURE — 97110 THERAPEUTIC EXERCISES: CPT | Mod: GP | Performed by: PHYSICAL THERAPIST

## 2023-12-14 ENCOUNTER — THERAPY VISIT (OUTPATIENT)
Dept: PHYSICAL THERAPY | Facility: CLINIC | Age: 73
End: 2023-12-14
Attending: FAMILY MEDICINE
Payer: COMMERCIAL

## 2023-12-14 DIAGNOSIS — G20.B2 PARKINSON'S DISEASE WITH DYSKINESIA AND FLUCTUATING MANIFESTATIONS (H): Primary | ICD-10-CM

## 2023-12-14 PROCEDURE — 97110 THERAPEUTIC EXERCISES: CPT | Mod: GP | Performed by: PHYSICAL THERAPIST

## 2023-12-14 PROCEDURE — 97112 NEUROMUSCULAR REEDUCATION: CPT | Mod: GP | Performed by: PHYSICAL THERAPIST

## 2023-12-14 NOTE — PROGRESS NOTES
[facility] Rehabilitation Service     Outpatient Physical Therapy Progress Note       12/14/23 0500   Appointment Info   Signing clinician's name / credentials Graciela Vallecillo PT DPT   Total/Authorized Visits [facility] [place]   Visits Used 10  soc 10/31/23   Medical Diagnosis Parkinson's disease   PT Tx Diagnosis Parkinson's disease   Quick Adds Certification   Progress Note/Certification   Start of Care Date 10/31/23   Onset of illness/injury or Date of Surgery 10/18/23  (date of PT order)   Therapy Frequency 1x/wk x 4 weeks then 4x/wk x 4 more weeks for LSTV BIG protocol   Predicted Duration 90 days   Certification date from 10/31/23   Certification date to 01/28/24   Progress Note Completed Date 10/31/23   GOALS   PT Goals 2   PT Goal 1   Goal Description Independent HEP to continue stretching and strengthening on own   Rationale to maximize safety and independence with performance of ADLs and functional tasks   Goal Progress Pt relates compliant but does not push as hard.   Target Date 01/28/24   PT Goal 2   Goal Description improved balance demonstrated by 20% improvement in scores on balance assessments   Rationale to maximize safety and independence with performance of ADLs and functional tasks   Goal Progress Feels balance is a little better - pt feels 25% better   Target Date 01/28/24   Subjective Report   Subjective Report Pt relates calves are feeling better however he is sore today. Pt relates he is walking better but he still wants to work no improving step length and balance. Pt does not have any other concerns.   Objective Measure 2   Details see attached noted for objective tests   Treatment Interventions (PT)   Interventions Neuromuscular Re-education   Therapeutic Procedure/Exercise   Therapeutic Procedures: strength, endurance, ROM, flexibillity minutes (67117) 30   Ther Proc 1 - Details LSVT - Exercise 1 - floor  to ceiling x 10 reps   Ther Proc 2 mod cues to open hands wider, good counting volume, cues to count louder if going to count on all of them. Cues to stretch arms back more   Ther Proc 2 - Details LSVT - Exercise 2 - side to side x10   Ther Proc 3 must use chair to hold on for balance, mod cues for turning. cues for big finish - used cone to help with turning to the R   Ther Proc 3 - Details LSVT - Exercises 3 - Fwd step and reach x 10   Ther Proc 4 alternating steps; cues to slap hands as too painful to stomp - used box to help prevent creeping fwd as pt requires cues to take big step back   Ther Proc 4 - Details LSVT - Exercises 4 - sideways step and reach x 10   Ther Proc 5 pt relates not ready to alt due to endurance. cues to step to side and big finish back to the middle   Ther Proc 5 - Details LSVT - Exercises 5 - Backward step and reach x 10   Ther Proc 6 cues for stepping- cues for higher step on R due to unable to stomp   Ther Proc 6 - Details LSVT - Exercise 6 - Fwd Rock and reach x 10   Ther Proc 7 cues for BIGGER stride step to start;  wt shift first then reach higher, spread fingers wide especially on R   Ther Proc 7 - Details LSVT Exercise 7 - sideways rock and reach x 10   Ther Proc 8 cues for bigger turn, did start alternating; cues for bigger finish and slap   Ther Proc 9 - Details Stretches: gastroc stretch in standing 30 secs x 2   Skilled Intervention tactile, verbal and visual cueing for proper technique to recalibrate sensory system   Patient Response/Progress mod cues for big finish and louder counting, R knee pain limits movement   Therapeutic Activity   Therapeutic Activities: dynamic activities to improve functional performance minutes (97339) 3   Ther Act 1 sit to stand x 20   Ther Act 1 - Details pt relats unable to use foam due to pt relates poor balance   Skilled Intervention improve daily activities   Patient Response/Progress demonstrates improvement   Neuromuscular Re-education    Neuromuscular re-ed of mvmt, balance, coord, kinesthetic sense, posture, proprioception minutes (93880) 14   Neuromuscular Re-education Neuro Re-ed 2;Neuro Re-ed 3;Neuro Re-ed 4;Neuro Re-ed 5;Neuro Re-ed 7;Neuro Re-ed 6;Neuro Re-ed 8;Neuro Re-ed 9;Neuro Re-ed 10   Neuro Re-ed 1 dynamic gait tasks such as walking w head turns, stepping over obstables, fast/slow, heel toe, backwards, eyes closed   Skilled Intervention improve balance   Patient Response/Progress demonstrates understanding   Gait Training   Gait Training Minutes, includes stair climbing (82271) 2   Gait 1 LSVT gait training - w focus on big amplitude movemtns - 8min   Gait 1 - Details mod cues for bigger movements, occasionally shuffles, cues for U turns, mod cues to marching for bigger step   Skilled Intervention improve mobility   Patient Response/Progress pt relates walked 1 mile yesterday, usually goes by himself. pt is encoruaged to focus on large amplitude movement vs distance   Education   Learner/Method Patient;Listening;Reading;Demonstration;Pictures/Video   Education Comments Mod education on importance of practices functional tasks etc however pt relates they are not that bad.   Plan   Home program LSVT BIG MDE and BIG walking HW: big arm swing at walk at home   Plan for next session contiune LSVT big per protocol   Comments   Comments Pt has been seen for 10 visits over this POC. In that time, pt is demonstrating improved gait speed and arm swing with ambulation. Balance is also improving per FGA. Pt does require mod verbal cues and demonstration to complete daily activities with large amplitude thus would benefit from continue therapy. While pt would benefit from practicing functional component tasks and hierarchy tasks, pt frequently declines and relates they are not issues. Plan to continue per LSVT protocol as pt is willing to participate.   Total Session Time   Timed Code Treatment Minutes 49   Total Treatment Time (sum of timed and  untimed services) 49         PLAN  Continue therapy per current plan of care.    Beginning/End Dates of Progress Note Reporting Period:  10/31/23 to 12/14/2023    Referring Provider:  Adalberto Gautam

## 2023-12-14 NOTE — PROGRESS NOTES
LSVT BIG Assessment tasks Initial date:  11/15/23   Progress note (10th visit): 12/14/23  Final date:                       #1  Sit to Stand                5 times Sit to Stand  Initial:    5.88 sec      10th Visit Progress Note:                                                       Final:                      30 second Sit to Stand Initial:  17 time       10th Visit Progress Note: 20 times                                                          Final  #2 Gait                Functional Gait Assessment:  22/30 10th Visit Progress Note:25/30                                   10 Meter Walk gait velocity Initial:  1.74 m/sec      10th Visit Progress Note:2.32 m/secs                                            Final:                TUG  Initial:  6.64 sec     10th Visit Progress Note: 6.39 secs w short stride no AD                          Final:                  TUG Carry  Initial:   8.04 sec  carry R hand  10th Visit Progress Note:  9.03 secs                                     Final:                          TUG Cognitive  Initial:  8.09  count backward      10th Visit Progress Note: 6.44 secs - count backwards by 3                                             Final:                swing  #3 Balance                Mini BEST  Initial:  15/28     10th Visit Progress Note: ran out of time to assess                                     Final

## 2023-12-15 ENCOUNTER — THERAPY VISIT (OUTPATIENT)
Dept: PHYSICAL THERAPY | Facility: CLINIC | Age: 73
End: 2023-12-15
Attending: FAMILY MEDICINE
Payer: COMMERCIAL

## 2023-12-15 DIAGNOSIS — G20.B2 PARKINSON'S DISEASE WITH DYSKINESIA AND FLUCTUATING MANIFESTATIONS (H): Primary | ICD-10-CM

## 2023-12-15 PROCEDURE — 97110 THERAPEUTIC EXERCISES: CPT | Mod: GP | Performed by: PHYSICAL THERAPIST

## 2023-12-15 PROCEDURE — 97530 THERAPEUTIC ACTIVITIES: CPT | Mod: GP | Performed by: PHYSICAL THERAPIST

## 2023-12-26 NOTE — PROGRESS NOTES
Physical Therapy Discharge Summary 12/26/2023  Status as of final visit on 12/15/23    Appointment Info   Signing clinician's name / credentials Ivonne Deleon PT DPT   Total/Authorized Visits [facility] [place]   Visits Used 10  soc 10/31/23   Medical Diagnosis Parkinson's disease   PT Tx Diagnosis Parkinson's disease   Quick Adds Certification   Progress Note/Certification   Start of Care Date 10/31/23   Onset of illness/injury or Date of Surgery 10/18/23  (date of PT order)   Therapy Frequency 1x/wk x 4 weeks then 4x/wk x 4 more weeks for LSTV BIG protocol   Predicted Duration 90 days   Certification date from 10/31/23   Certification date to 01/28/24   Progress Note Completed Date 12/14/23   GOALS   PT Goals 2   PT Goal 1   Goal Description Independent HEP to continue stretching and strengthening on own   Rationale to maximize safety and independence with performance of ADLs and functional tasks   Goal Progress Pt relates compliant but does not push as hard.   Target Date 01/28/24   PT Goal 2   Goal Description improved balance demonstrated by 20% improvement in scores on balance assessments   Rationale to maximize safety and independence with performance of ADLs and functional tasks   Goal Progress Feels balance is a little better - pt feels 25% better   Target Date 01/28/24   Subjective Report   Subjective Report had a rough week; is stretching too far; got brace for R knee; walking to hockey game last night felt good   Therapeutic Procedure/Exercise   Therapeutic Procedures: strength, endurance, ROM, flexibillity minutes (95824) 30   Ther Proc 1 - Details LSVT - Exercise 1 - floor to ceiling x 10 reps   Ther Proc 2 mod cues to open hands wider, good counting volume, cues to count louder if going to count on all of them. Cues to stretch arms back more and Pause slightly longer   Ther Proc 2 - Details LSVT - Exercise 2 - side to side x10   Ther Proc 3 must use chair to hold on for balance, mod  "cues for turning. cues for big finish; encouraged slap with L hand as big finish   Ther Proc 3 - Details LSVT - Exercises 3 - Fwd step and reach x 10   Ther Proc 4 alternating steps; cues to slap hands as too painful to stomp - used box to help prevent creeping fwd as pt requires cues to take big step back   Ther Proc 4 - Details LSVT - Exercises 4 - sideways step and reach x 10   Ther Proc 5 pt relates not ready to alt due to endurance. cues to step to side and big finish back to the middle; slight  Pause   Ther Proc 5 - Details LSVT - Exercises 5 - Backward step and reach x 10   Ther Proc 6 2   Ther Proc 6 - Details LSVT - Exercise 6 - Fwd Rock and reach x 10   Ther Proc 7 cues for BIGGER stride step to start;  wt shift first then reach higher, spread fingers wide especially on R   Ther Proc 7 - Details LSVT Exercise 7 - sideways rock and reach x 10   Ther Proc 8 cues for bigger turn, alternate; LOOK at hand, TWIST entire body; cues for bigger finish and slap   Therapeutic Activity   Therapeutic Activities: dynamic activities to improve functional performance minutes (15487) 30   Ther Act 1 sit to stand x 10   Ther Act 1 - Details second set of 10 feet on foam   Ther Act 2 BIG signature   Ther Act 3 backward stepping then forward stepping, 26' repeatedly   Ther Act 3 - Details best effort 9\" step length; forward ~18\" stride   Ther Act 4 scissor jump   Ther Act 4 - Details x 8 reps 13.9 sec leg fatigue; will practice at home   Ther Act 5 obstacles and step over   Ther Act 5 - Details stepping over bolster step up/over through 3 sets the walk5' on folded mat   Ther Act 6 cues for larger steps   Ther Act 6 - Details tandem steps x 14 on tape; 6 steps on 2x4 beam; reports double vision with beam   Ther Act 7 head turns during walk   Ther Act 7 - Details gait slowed; c/o difficulty wiht balance   Skilled Intervention improve daily activities   Ther Act 2 - Details BIG signature on 8 1/2x11 paper 2 lines across page "   Education   Learner/Method Patient;Listening;Reading;Demonstration;Pictures/Video   Plan   Home program    Updates to plan of care Serge reported he does not want to continue with current PT program. He has an appointment with Dr Page (Neurology)  Jan 22, 2024 and wants to wait to hear his recommendations.  Serge states he will continue to walk, use UE dumb bells and trunk extn machine at home to strengthen on his own.  Goals not met. Pt chooses to discontinue PT                         DISCHARGE  Reason for Discharge: Patient chooses to discontinue therapy.    Equipment Issued: none    Discharge Plan: Serge will continue his own exercise program    Referring Provider:  Adalberto Gautam MD

## 2024-01-22 ENCOUNTER — LAB (OUTPATIENT)
Dept: LAB | Facility: HOSPITAL | Age: 74
End: 2024-01-22
Payer: COMMERCIAL

## 2024-01-22 ENCOUNTER — OFFICE VISIT (OUTPATIENT)
Dept: NEUROLOGY | Facility: CLINIC | Age: 74
End: 2024-01-22
Payer: COMMERCIAL

## 2024-01-22 VITALS
HEART RATE: 83 BPM | SYSTOLIC BLOOD PRESSURE: 124 MMHG | DIASTOLIC BLOOD PRESSURE: 81 MMHG | WEIGHT: 176 LBS | BODY MASS INDEX: 29.29 KG/M2

## 2024-01-22 DIAGNOSIS — G25.2 RESTING TREMOR: ICD-10-CM

## 2024-01-22 DIAGNOSIS — R29.898 RIGHT LEG WEAKNESS: ICD-10-CM

## 2024-01-22 DIAGNOSIS — R29.2 HYPERREFLEXIA: ICD-10-CM

## 2024-01-22 DIAGNOSIS — R29.898 RIGHT LEG WEAKNESS: Primary | ICD-10-CM

## 2024-01-22 DIAGNOSIS — G20.C PARKINSONISM, UNSPECIFIED PARKINSONISM TYPE (H): ICD-10-CM

## 2024-01-22 LAB
ALBUMIN SERPL BCG-MCNC: 4.3 G/DL (ref 3.5–5.2)
ALP SERPL-CCNC: 85 U/L (ref 40–150)
ALT SERPL W P-5'-P-CCNC: 22 U/L (ref 0–70)
AST SERPL W P-5'-P-CCNC: 19 U/L (ref 0–45)
BILIRUB DIRECT SERPL-MCNC: <0.2 MG/DL (ref 0–0.3)
BILIRUB SERPL-MCNC: 0.5 MG/DL
HBA1C MFR BLD: 5.3 %
PROT SERPL-MCNC: 7.7 G/DL (ref 6.4–8.3)
TSH SERPL DL<=0.005 MIU/L-ACNC: 2.88 UIU/ML (ref 0.3–4.2)
VIT B12 SERPL-MCNC: 876 PG/ML (ref 232–1245)

## 2024-01-22 PROCEDURE — 83036 HEMOGLOBIN GLYCOSYLATED A1C: CPT

## 2024-01-22 PROCEDURE — 86618 LYME DISEASE ANTIBODY: CPT

## 2024-01-22 PROCEDURE — 82607 VITAMIN B-12: CPT

## 2024-01-22 PROCEDURE — 80076 HEPATIC FUNCTION PANEL: CPT

## 2024-01-22 PROCEDURE — 86037 ANCA TITER EACH ANTIBODY: CPT

## 2024-01-22 PROCEDURE — 82390 ASSAY OF CERULOPLASMIN: CPT

## 2024-01-22 PROCEDURE — 82164 ANGIOTENSIN I ENZYME TEST: CPT

## 2024-01-22 PROCEDURE — 99215 OFFICE O/P EST HI 40 MIN: CPT | Performed by: PSYCHIATRY & NEUROLOGY

## 2024-01-22 PROCEDURE — 36415 COLL VENOUS BLD VENIPUNCTURE: CPT

## 2024-01-22 PROCEDURE — 82525 ASSAY OF COPPER: CPT

## 2024-01-22 PROCEDURE — 84443 ASSAY THYROID STIM HORMONE: CPT

## 2024-01-22 NOTE — PROGRESS NOTES
NEUROLOGY OUTPATIENT CONSULT NOTE   Jan 22, 2024     CHIEF COMPLAINT/REASON FOR VISIT/REASON FOR CONSULT  Patient presents with:  Tremors: Previously Neurology left; here to Establish care     REASON FOR CONSULTATION- Leg weakness    REFERRAL SOURCE  Dr. Gayla Griffith   Dr. Gayla Griffith    HISTORY OF PRESENT ILLNESS  Jaime Saucedo is a 73 year old male seen today for evaluation of leg weakness.  He reports that the symptoms came on in April 2022.  In April 2021 he did have an episode of COVID infection.  Did have some long-haul symptoms.  Right leg is more prominently involved than the left leg.  Symptoms have been progressive since then.  Does complain of some minor back pain.  No symptoms in his hands.  Does not drop things with his hands though there might be some difficulty with the right hand with squeezing things.  Does complain of some loss of balance at times.  Does have some poor vision which might be leading to loss of balance.  Does report some stiffness in his legs along with some numbness in the feet.  He did have a EMG done through [facility] which was negative for neuropathy.  He was started on Mirapex since he does not have a resting tremor on the right side for possible Parkinson's disease.  This has been helping occasionally it does help significantly at nighttime for restless leg syndrome.  Denies any major neck pain.    Previous history is reviewed and this is unchanged.    PAST MEDICAL/SURGICAL HISTORY  Past Medical History:   Diagnosis Date    Hypertension     Obstructive sleep apnea syndrome 6/1/2011    Pneumonia due to 2019 novel coronavirus 3/26/2021     Patient Active Problem List   Diagnosis    Hypertension goal BP (blood pressure) < 140/90    Diplopia    Vertigo    Hyperlipidemia LDL goal <130    Restless legs syndrome (RLS)    Obstructive sleep apnea syndrome    Parkinson's disease with dyskinesia and fluctuating manifestations   Significant for sleep disorder,  vision symptoms, arthritis, tremors from Parkinson's disease, high blood pressure, high cholesterol    FAMILY HISTORY  Family History   Problem Relation Age of Onset    Hypertension Mother     Hypertension Father    Reviewed and negative for neurological problems    SOCIAL HISTORY  Social History     Tobacco Use    Smoking status: Never    Smokeless tobacco: Never   Vaping Use    Vaping Use: Never used   Substance Use Topics    Alcohol use: Yes     Comment: rare    Drug use: No       SYSTEMS REVIEW  Twelve-system ROS was done and other than the HPI this was negative/positive for back pain, arm and leg pain, joint pain, numbness/tingling, weakness paralysis, difficulty walking, balance/coordination problems, movement disorder, ringing in the ears, hearing loss, vision symptoms, sleepiness during the day, sleeping problems, bowel problems.    MEDICATIONS  losartan (COZAAR) 25 MG tablet, Take 0.5 tablets (12.5 mg) by mouth daily  pramipexole (MIRAPEX) 0.25 MG tablet, Take 1 tablet (0.25 mg) by mouth 3 times daily  sildenafil (REVATIO) 20 MG tablet, 1-2 tablets 1 hour prior to sexual intercourse.  simvastatin (ZOCOR) 40 MG tablet, Take 1 tablet (40 mg) by mouth at bedtime    No current facility-administered medications on file prior to visit.       PHYSICAL EXAMINATION  VITALS: /81   Pulse 83   Wt 79.8 kg (176 lb)   BMI 29.29 kg/m    GENERAL: Healthy appearing, alert, no acute distress, normal habitus.  CARDIOVASCULAR: Extremities warm and well perfused. Pulses present.   NEUROLOGICAL:  Patient is awake and oriented to self, place and time.  Attention span is normal.  Memory is grossly intact.  Language is fluent and follows commands appropriately.  Appropriate fund of knowledge. Cranial nerves 2-12 are intact. There is no pronator drift.  Motor exam shows 5/5 strength in all extremities.  Tone is symmetric in the lower extremities.  Mild resting right upper and lower extremity tremor present.  Cogwheeling  present on the right and left upper extremity.  Reflexes are brisker on the right side and 2+ brisk in upper extremities and lower extremities. Sensory exam is grossly intact to light touch, pin prick and vibration.  Finger to nose and heel to shin is without dysmetria.  Romberg is negative.  Gait is normal and the patient is able to do tandem walk and walk on toes and heels with some difficulty.  Slight decreased arm swing.    DIAGNOSTICS  MRI Brain 2022  CONCLUSION:   1. No acute infarcts, mass lesions or hemorrhage.   2.  Moderate burden of patchy and punctate deep white matter abnormality in both cerebral hemispheres, likely reflecting chronic small vessel ischemic disease, slightly increased overall since the 03/26/2021 comparison study.     CTA H&N  IMPRESSION:  1. Negative CT angiography of the head and neck.  2. Bilateral groundglass infiltrates in the upper lung zones. This can  be seen in COVID-19 patients, although other pneumonias can also give  this process.    RELEVANT LABS  TSH 2.33  B12 766  MILA positive  ESR negative  CRP negative  Serum protein electrophoresis negative  A1c 5.4  Methylmalonic acid 131  Ferritin 256    EMG      OUTSIDE RECORDS  Outside referral notes and chart notes were reviewed and pertinent information has been summarized (in addition to the HPI):-        IMPRESSION/REPORT/PLAN  Right leg weakness  Resting tremor  Parkinsonism, unspecified Parkinsonism type  Hyperreflexia    This is a 73 year old male with progressive right-sided weakness with a right-sided resting tremor and cogwheeling in both upper extremities.  Exam further shows significant hyperreflexia more on the right side than the left.  Symptoms are not completely consistent with Parkinson's disease though could be a Parkinson's plus syndrome like cortical basal degeneration.  Could be secondary parkinsonism as well.    Previous MRI brain has been negative for structural lesions.  Blood work has been noncontributory.   EMG did not show evidence of peripheral neuropathy/myopathy.  To further evaluate the symptoms we will check an MRI of the brain/C-spine with and without contrast.  Will also check blood work to look for causes of neuromuscular disorders.  Could consider DaTscan since he does have some parkinsonism on exam.    He can continue the Mirapex for restless leg syndrome and Parkinson's disease in the meantime.    I can see him back in about a month after testing.    -     MR Cervical Spine w/o & w Contrast; Future  -     MR Brain w/o & w Contrast; Future  -     Hepatic function panel; Future  -     Vitamin B12; Future  -     TSH with free T4 reflex; Future  -     Hemoglobin A1c; Future  -     Ceruloplasmin; Future  -     Copper level; Future  -     ANCA IgG by IFA with Reflex to Titer; Future  -     Angiotensin converting enzyme; Future  -     Lyme Disease Total Abs Bld with Reflex to Confirm CLIA; Future    Return in about 1 month (around 2/22/2024) for In-Clinic Visit (must), Add on PAOR, After testing.    Over 60 minutes were spent coordinating the care for the patient on the day of the encounter.  This includes previsit, during visit and post visit activities as documented above.  Counseling patient.  Reviewing outside records.  High risk.  Testing ordered.  Multiple problems reviewed/addressed.  (Activities include but not inclusive of reviewing chart, reviewing outside records, reviewing labs and imaging study results as well as the images, patient visit time including getting history and exam,  use if applicable, review of test results with the patient and coming up with a plan in a shared model, counseling patient and family, education and answering patient questions, EMR , EMR diagnosis entry and problem list management, medication reconciliation and prescription management if applicable, paperwork if applicable, printing documents and documentation of the visit activities.)        Harsh  MD Camilo  Neurologist  [facility]  Tel:- [phone]    This note was dictated using voice recognition software.  Any grammatical or context distortions are unintentional and inherent to the software.

## 2024-01-22 NOTE — LETTER
1/22/2024         RE: Jaime Saucedo  [address] [facility] [id number]        Dear Colleague,    Thank you for referring your patient, Jaime Saucedo, to the [facility] [place]. Please see a copy of my visit note below.    NEUROLOGY OUTPATIENT CONSULT NOTE   Jan 22, 2024     CHIEF COMPLAINT/REASON FOR VISIT/REASON FOR CONSULT  Patient presents with:  Tremors: Previously Neurology left; here to Establish care     REASON FOR CONSULTATION- Leg weakness    REFERRAL SOURCE  Dr. Gayla Griffith  CC Dr. Gayla Griffith    HISTORY OF PRESENT ILLNESS  Jaime Saucedo is a 73 year old male seen today for evaluation of leg weakness.  He reports that the symptoms came on in April 2022.  In April 2021 he did have an episode of COVID infection.  Did have some long-haul symptoms.  Right leg is more prominently involved than the left leg.  Symptoms have been progressive since then.  Does complain of some minor back pain.  No symptoms in his hands.  Does not drop things with his hands though there might be some difficulty with the right hand with squeezing things.  Does complain of some loss of balance at times.  Does have some poor vision which might be leading to loss of balance.  Does report some stiffness in his legs along with some numbness in the feet.  He did have a EMG done through [facility] neurology which was negative for neuropathy.  He was started on Mirapex since he does not have a resting tremor on the right side for possible Parkinson's disease.  This has been helping occasionally it does help significantly at nighttime for restless leg syndrome.  Denies any major neck pain.    Previous history is reviewed and this is unchanged.    PAST MEDICAL/SURGICAL HISTORY  Past Medical History:   Diagnosis Date     Hypertension      Obstructive sleep apnea syndrome 6/1/2011     Pneumonia due to 2019 novel coronavirus 3/26/2021     Patient Active Problem List   Diagnosis      Hypertension goal BP (blood pressure) < 140/90     Diplopia     Vertigo     Hyperlipidemia LDL goal <130     Restless legs syndrome (RLS)     Obstructive sleep apnea syndrome     Parkinson's disease with dyskinesia and fluctuating manifestations   Significant for sleep disorder, vision symptoms, arthritis, tremors from Parkinson's disease, high blood pressure, high cholesterol    FAMILY HISTORY  Family History   Problem Relation Age of Onset     Hypertension Mother      Hypertension Father    Reviewed and negative for neurological problems    SOCIAL HISTORY  Social History     Tobacco Use     Smoking status: Never     Smokeless tobacco: Never   Vaping Use     Vaping Use: Never used   Substance Use Topics     Alcohol use: Yes     Comment: rare     Drug use: No       SYSTEMS REVIEW  Twelve-system ROS was done and other than the HPI this was negative/positive for back pain, arm and leg pain, joint pain, numbness/tingling, weakness paralysis, difficulty walking, balance/coordination problems, movement disorder, ringing in the ears, hearing loss, vision symptoms, sleepiness during the day, sleeping problems, bowel problems.    MEDICATIONS  losartan (COZAAR) 25 MG tablet, Take 0.5 tablets (12.5 mg) by mouth daily  pramipexole (MIRAPEX) 0.25 MG tablet, Take 1 tablet (0.25 mg) by mouth 3 times daily  sildenafil (REVATIO) 20 MG tablet, 1-2 tablets 1 hour prior to sexual intercourse.  simvastatin (ZOCOR) 40 MG tablet, Take 1 tablet (40 mg) by mouth at bedtime    No current facility-administered medications on file prior to visit.       PHYSICAL EXAMINATION  VITALS: /81   Pulse 83   Wt 79.8 kg (176 lb)   BMI 29.29 kg/m    GENERAL: Healthy appearing, alert, no acute distress, normal habitus.  CARDIOVASCULAR: Extremities warm and well perfused. Pulses present.   NEUROLOGICAL:  Patient is awake and oriented to self, place and time.  Attention span is normal.  Memory is grossly intact.  Language is fluent and follows  commands appropriately.  Appropriate fund of knowledge. Cranial nerves 2-12 are intact. There is no pronator drift.  Motor exam shows 5/5 strength in all extremities.  Tone is symmetric in the lower extremities.  Mild resting right upper and lower extremity tremor present.  Cogwheeling present on the right and left upper extremity.  Reflexes are brisker on the right side and 2+ brisk in upper extremities and lower extremities. Sensory exam is grossly intact to light touch, pin prick and vibration.  Finger to nose and heel to shin is without dysmetria.  Romberg is negative.  Gait is normal and the patient is able to do tandem walk and walk on toes and heels with some difficulty.  Slight decreased arm swing.    DIAGNOSTICS  MRI Brain 2022  CONCLUSION:   1. No acute infarcts, mass lesions or hemorrhage.   2.  Moderate burden of patchy and punctate deep white matter abnormality in both cerebral hemispheres, likely reflecting chronic small vessel ischemic disease, slightly increased overall since the 03/26/2021 comparison study.     CTA H&N  IMPRESSION:  1. Negative CT angiography of the head and neck.  2. Bilateral groundglass infiltrates in the upper lung zones. This can  be seen in COVID-19 patients, although other pneumonias can also give  this process.    RELEVANT LABS  TSH 2.33  B12 766  MILA positive  ESR negative  CRP negative  Serum protein electrophoresis negative  A1c 5.4  Methylmalonic acid 131  Ferritin 256    EMG      OUTSIDE RECORDS  Outside referral notes and chart notes were reviewed and pertinent information has been summarized (in addition to the HPI):-        IMPRESSION/REPORT/PLAN  Right leg weakness  Resting tremor  Parkinsonism, unspecified Parkinsonism type  Hyperreflexia    This is a 73 year old male with progressive right-sided weakness with a right-sided resting tremor and cogwheeling in both upper extremities.  Exam further shows significant hyperreflexia more on the right side than the left.   Symptoms are not completely consistent with Parkinson's disease though could be a Parkinson's plus syndrome like cortical basal degeneration.  Could be secondary parkinsonism as well.    Previous MRI brain has been negative for structural lesions.  Blood work has been noncontributory.  EMG did not show evidence of peripheral neuropathy/myopathy.  To further evaluate the symptoms we will check an MRI of the brain/C-spine with and without contrast.  Will also check blood work to look for causes of neuromuscular disorders.  Could consider DaTscan since he does have some parkinsonism on exam.    He can continue the Mirapex for restless leg syndrome and Parkinson's disease in the meantime.    I can see him back in about a month after testing.    -     MR Cervical Spine w/o & w Contrast; Future  -     MR Brain w/o & w Contrast; Future  -     Hepatic function panel; Future  -     Vitamin B12; Future  -     TSH with free T4 reflex; Future  -     Hemoglobin A1c; Future  -     Ceruloplasmin; Future  -     Copper level; Future  -     ANCA IgG by IFA with Reflex to Titer; Future  -     Angiotensin converting enzyme; Future  -     Lyme Disease Total Abs Bld with Reflex to Confirm CLIA; Future    Return in about 1 month (around 2/22/2024) for In-Clinic Visit (must), Add on PAOR, After testing.    Over 60 minutes were spent coordinating the care for the patient on the day of the encounter.  This includes previsit, during visit and post visit activities as documented above.  Counseling patient.  Reviewing outside records.  High risk.  Testing ordered.  Multiple problems reviewed/addressed.  (Activities include but not inclusive of reviewing chart, reviewing outside records, reviewing labs and imaging study results as well as the images, patient visit time including getting history and exam,  use if applicable, review of test results with the patient and coming up with a plan in a shared model, counseling patient and  family, education and answering patient questions, EMR , EMR diagnosis entry and problem list management, medication reconciliation and prescription management if applicable, paperwork if applicable, printing documents and documentation of the visit activities.)        Nigel Page MD  Neurologist  [facility]  Tel:- [phone]    This note was dictated using voice recognition software.  Any grammatical or context distortions are unintentional and inherent to the software.        Again, thank you for allowing me to participate in the care of your patient.        Sincerely,        Nigel Page MD

## 2024-01-22 NOTE — NURSING NOTE
"Jaime Saucedo is a 73 year old male who presents for:  Chief Complaint   Patient presents with    Tremors     Previously Neurology left; here to Establish care         Initial Vitals:  /81   Pulse 83   Wt 79.8 kg (176 lb)   BMI 29.29 kg/m   Estimated body mass index is 29.29 kg/m  as calculated from the following:    Height as of 10/18/23: 1.651 m (5' 5\").    Weight as of this encounter: 79.8 kg (176 lb).. Body surface area is 1.91 meters squared. BP completed using cuff size: wrist cuff    Karan Geiger  "

## 2024-01-23 LAB
ACE SERPL-CCNC: 30 U/L
ANCA AB PATTERN SER IF-IMP: NORMAL
B BURGDOR IGG+IGM SER QL: 0.05
C-ANCA TITR SER IF: NORMAL {TITER}
COPPER SERPL-MCNC: 98.5 UG/DL

## 2024-01-25 LAB — CERULOPLASMIN SERPL-MCNC: 17 MG/DL (ref 20–60)

## 2024-02-06 ENCOUNTER — HOSPITAL ENCOUNTER (OUTPATIENT)
Dept: MRI IMAGING | Facility: CLINIC | Age: 74
Discharge: HOME | End: 2024-02-06
Attending: PSYCHIATRY & NEUROLOGY
Payer: COMMERCIAL

## 2024-02-06 DIAGNOSIS — R29.898 RIGHT LEG WEAKNESS: ICD-10-CM

## 2024-02-06 PROCEDURE — 72156 MRI NECK SPINE W/O & W/DYE: CPT

## 2024-02-06 PROCEDURE — 255N000002 HC RX 255 OP 636: Performed by: PSYCHIATRY & NEUROLOGY

## 2024-02-06 PROCEDURE — A9585 GADOBUTROL INJECTION: HCPCS | Performed by: PSYCHIATRY & NEUROLOGY

## 2024-02-06 PROCEDURE — 70553 MRI BRAIN STEM W/O & W/DYE: CPT

## 2024-02-06 RX ORDER — GADOBUTROL 604.72 MG/ML
8 INJECTION INTRAVENOUS ONCE
Status: COMPLETED | OUTPATIENT
Start: 2024-02-06 | End: 2024-02-06

## 2024-02-06 RX ADMIN — GADOBUTROL 8 ML: 604.72 INJECTION INTRAVENOUS at 13:02

## 2024-02-27 ENCOUNTER — OFFICE VISIT (OUTPATIENT)
Dept: NEUROLOGY | Facility: CLINIC | Age: 74
End: 2024-02-27
Payer: COMMERCIAL

## 2024-02-27 VITALS
HEART RATE: 62 BPM | WEIGHT: 174.1 LBS | SYSTOLIC BLOOD PRESSURE: 116 MMHG | BODY MASS INDEX: 28.97 KG/M2 | DIASTOLIC BLOOD PRESSURE: 74 MMHG

## 2024-02-27 DIAGNOSIS — R26.81 UNSTEADY: ICD-10-CM

## 2024-02-27 DIAGNOSIS — R93.89 ABNORMAL MRI: ICD-10-CM

## 2024-02-27 DIAGNOSIS — E83.00 LOW CERULOPLASMIN LEVEL (H): Primary | ICD-10-CM

## 2024-02-27 DIAGNOSIS — G20.C PARKINSONISM, UNSPECIFIED PARKINSONISM TYPE (H): ICD-10-CM

## 2024-02-27 DIAGNOSIS — R90.89 T2 HYPERINTENSE FOCI PRESENT IN CEREBRAL CORTEX ON MAGNETIC RESONANCE IMAGING: ICD-10-CM

## 2024-02-27 PROCEDURE — G2211 COMPLEX E/M VISIT ADD ON: HCPCS | Performed by: PSYCHIATRY & NEUROLOGY

## 2024-02-27 PROCEDURE — 99215 OFFICE O/P EST HI 40 MIN: CPT | Performed by: PSYCHIATRY & NEUROLOGY

## 2024-02-27 RX ORDER — COPPER GLUCONATE 2 MG
2 TABLET ORAL DAILY
Qty: 30 TABLET | Refills: 0 | Status: SHIPPED | OUTPATIENT
Start: 2024-02-27 | End: 2024-08-23

## 2024-02-27 NOTE — PROGRESS NOTES
NEUROLOGY OUTPATIENT PROGRESS NOTE   Feb 27, 2024     CHIEF COMPLAINT/REASON FOR VISIT/REASON FOR CONSULT  Patient presents with:  Tremors: Follow up    REASON FOR CONSULTATION- Leg weakness    REFERRAL SOURCE  Dr. Gayla Griffith   Dr. Gayla Griffith    HISTORY OF PRESENT ILLNESS  Jaime Saucedo is a 73 year old male seen today for evaluation of leg weakness.  He reports that the symptoms came on in April 2022.  In April 2021 he did have an episode of COVID infection.  Did have some long-haul symptoms.  Right leg is more prominently involved than the left leg.  Symptoms have been progressive since then.  Does complain of some minor back pain.  No symptoms in his hands.  Does not drop things with his hands though there might be some difficulty with the right hand with squeezing things.  Does complain of some loss of balance at times.  Does have some poor vision which might be leading to loss of balance.  Does report some stiffness in his legs along with some numbness in the feet.  He did have a EMG done through [facility] which was negative for neuropathy.  He was started on Mirapex since he does not have a resting tremor on the right side for possible Parkinson's disease.  This has been helping occasionally it does help significantly at nighttime for restless leg syndrome.  Denies any major neck pain.    2/27/24  Patient returns today.  Feels that the symptoms are about the same.  No progression or any new symptoms.  Complains of balance issues.  Feels that that he has a plugged ear.  Wants to follow-up with the ENT provider.  I did put in a referral for him.  Pramipexole is helping at nighttime with the symptoms.  Discussed mechanism of action of pramipexole.  Does not want to try medications for right now and wants to get a DaTscan.  No new concerns.    Previous history is reviewed and this is unchanged.    PAST MEDICAL/SURGICAL HISTORY  Past Medical History:   Diagnosis Date    Hypertension      Obstructive sleep apnea syndrome 6/1/2011    Pneumonia due to 2019 novel coronavirus 3/26/2021     Patient Active Problem List   Diagnosis    Hypertension goal BP (blood pressure) < 140/90    Diplopia    Vertigo    Hyperlipidemia LDL goal <130    Restless legs syndrome (RLS)    Obstructive sleep apnea syndrome    Parkinson's disease with dyskinesia and fluctuating manifestations   Significant for sleep disorder, vision symptoms, arthritis, tremors from Parkinson's disease, high blood pressure, high cholesterol    FAMILY HISTORY  Family History   Problem Relation Age of Onset    Hypertension Mother     Hypertension Father    Reviewed and negative for neurological problems    SOCIAL HISTORY  Social History     Tobacco Use    Smoking status: Never    Smokeless tobacco: Never   Vaping Use    Vaping Use: Never used   Substance Use Topics    Alcohol use: Yes     Comment: rare    Drug use: No       SYSTEMS REVIEW  Twelve-system ROS was done and other than the HPI this was negative/positive for back pain, arm and leg pain, joint pain, numbness/tingling, weakness paralysis, difficulty walking, balance/coordination problems, movement disorder, ringing in the ears, hearing loss, vision symptoms, sleepiness during the day, sleeping problems, bowel problems.  No new symptoms/issues.    MEDICATIONS  losartan (COZAAR) 25 MG tablet, Take 0.5 tablets (12.5 mg) by mouth daily  pramipexole (MIRAPEX) 0.25 MG tablet, Take 1 tablet (0.25 mg) by mouth 3 times daily  sildenafil (REVATIO) 20 MG tablet, 1-2 tablets 1 hour prior to sexual intercourse.  simvastatin (ZOCOR) 40 MG tablet, Take 1 tablet (40 mg) by mouth at bedtime    No current facility-administered medications on file prior to visit.       PHYSICAL EXAMINATION  VITALS: /74   Pulse 62   Wt 79 kg (174 lb 1.6 oz)   BMI 28.97 kg/m    GENERAL: Healthy appearing, alert, no acute distress, normal habitus.  CARDIOVASCULAR: Extremities warm and well perfused. Pulses present.    NEUROLOGICAL:  Patient is awake and oriented to self, place and time.  Attention span is normal.  Memory is grossly intact.  Language is fluent and follows commands appropriately.  Appropriate fund of knowledge. Cranial nerves 2-12 are intact. There is no pronator drift.  Motor exam shows 5/5 strength in all extremities.  Tone is symmetric in the lower extremities.  Mild resting right upper and lower extremity tremor present.  Cogwheeling present on the right and left upper extremity.  Reflexes are brisker on the right side and 2+ brisk in upper extremities and lower extremities. Sensory exam is grossly intact to light touch, pin prick and vibration.  Finger to nose and heel to shin is without dysmetria.  Romberg is negative.  Gait is normal and the patient is able to do tandem walk and walk on toes and heels with some difficulty.  Slight decreased arm swing.  Exam stable compared to before.    DIAGNOSTICS  MRI Brain 2022  CONCLUSION:   1. No acute infarcts, mass lesions or hemorrhage.   2.  Moderate burden of patchy and punctate deep white matter abnormality in both cerebral hemispheres, likely reflecting chronic small vessel ischemic disease, slightly increased overall since the 03/26/2021 comparison study.     CTA H&N  IMPRESSION:  1. Negative CT angiography of the head and neck.  2. Bilateral groundglass infiltrates in the upper lung zones. This can  be seen in COVID-19 patients, although other pneumonias can also give  this process.    RELEVANT LABS  TSH 2.33  B12 766  MILA positive  ESR negative  CRP negative  Serum protein electrophoresis negative  A1c 5.4  Methylmalonic acid 131  Ferritin 256    EMG      OUTSIDE RECORDS  Outside referral notes and chart notes were reviewed and pertinent information has been summarized (in addition to the HPI):-      MRI C spine-images reviewed.  No major spinal stenosis.  IMPRESSION:  1. Multilevel degenerative changes of the cervical spine, as  described.  2. No spinal  canal stenosis.  3. Mild/moderate left neural foraminal stenosis at C4-C5. Mild neural  foraminal narrowing elsewhere.  4. No spinal cord signal abnormality identified. No abnormal  enhancement seen.       MRI brain-images reviewed.  No major structural lesions noted  Significant T2 hyperintensities  IMPRESSION:  1. No definite acute intracranial process. Specifically, no findings  to suggest acute/early subacute infarct, intracranial hemorrhage,  extra axial fluid collection, or mass effect.  2. Brain atrophy and presumed chronic small vessel ischemic changes,  as described.  3. Diminished appearance of the T2 flow-voids within the lateral left  transverse and sigmoid dural venous sinuses, nonspecific. This may be  due to incidental slow flow or possibly stenosis. The possibility of  age-indeterminate dural venous sinus thrombosis/occlusion is difficult  to entirely excluded. If clinically warranted, a CT or MR venogram  with contrast could be considered for further evaluation.     Component      Latest Ref Rn 1/22/2024  12:43 PM   Protein Total      6.4 - 8.3 g/dL 7.7    Albumin      3.5 - 5.2 g/dL 4.3    Bilirubin Total      <=1.2 mg/dL 0.5    Alkaline Phosphatase      40 - 150 U/L 85    AST      0 - 45 U/L 19    ALT      0 - 70 U/L 22    Bilirubin Direct      0.00 - 0.30 mg/dL <0.20    Neutrophil Cytoplasmic Antibody      <1:10  <1:10    Neutrophil Cytoplasmic Antibody Pattern The ANCA IFA is <1:10. No further testing will be performed.    Vitamin B12      232 - 1,245 pg/mL 876    TSH      0.30 - 4.20 uIU/mL 2.88    Hemoglobin A1C      <5.7 % 5.3    Ceruloplasmin      20 - 60 mg/dL 17 (L)    Copper      70.0 - 140.0 ug/dL 98.5    Angiotensin Converting Enzyme      16 - 85 U/L 30    Lyme Disease Antibodies Serum      <0.90  0.05       Legend:  (L) Low    IMPRESSION/REPORT/PLAN  Right leg weakness  Resting tremor  Parkinsonism, unspecified Parkinsonism type  Hyperreflexia  T2 hyperintensities on MRI  Low  ceruloplasmin    This is a 73 year old male with progressive right-sided weakness with a right-sided resting tremor and cogwheeling in both upper extremities.  Exam further shows significant hyperreflexia more on the right side than the left.  Symptoms are not completely consistent with Parkinson's disease though could be a Parkinson's plus syndrome like cortical basal degeneration.  Could be secondary parkinsonism as well.    MRI brain overall noncontributory though does show some T2 hyperintensities.  Some of them are in a watershed pattern we will check a CT angiogram.  There are some concerns for venous occlusion and we will check a CT venogram as well.  Previous EMG was negative for neurological causes.  MRI C-spine did not show any spinal stenosis to explain the symptoms.  Blood work has been noncontributory except for low ceruloplasmin.  Will put him on a copper supplement for a month to see if it helps.    Since the pramipexole is helping he might have Parkinson's disease though he wants to get a DaTscan to confirm before trying Sinemet.  Will arrange.    He can continue the Mirapex for restless leg syndrome and possible Parkinson's disease in the meantime.  Need to stop this for the DaTscan.    Refer to ENT to evaluate for balance issues possibly coming from his ear.    Return back in 2 months.  Recommend exercise and healthy lifestyle.    -     copper gluconate 2 MG tablet; Take 1 tablet (2 mg) by mouth daily  -     NM Brain Imaging Tomographic (Spect) Datscan  -     CTA Head Neck with Contrast; Future  -     CTV Head Neck w Contrast; Future  -     Adult ENT  Referral; Future    Return in about 2 months (around 4/27/2024) for In-Clinic Visit (must), Add on PAOR, After testing.    Over 45 minutes were spent coordinating the care for the patient on the day of the encounter.  This includes previsit, during visit and post visit activities as documented above.  Counseling patient.  Multiple MRIs  reviewed.  Testing ordered.  High risk.  Prescription management.  (Activities include but not inclusive of reviewing chart, reviewing outside records, reviewing labs and imaging study results as well as the images, patient visit time including getting history and exam,  use if applicable, review of test results with the patient and coming up with a plan in a shared model, counseling patient and family, education and answering patient questions, EMR , EMR diagnosis entry and problem list management, medication reconciliation and prescription management if applicable, paperwork if applicable, printing documents and documentation of the visit activities.)        Nigel Page MD  Neurologist  [facility] Neurology [facility]  Tel:- [phone]    This note was dictated using voice recognition software.  Any grammatical or context distortions are unintentional and inherent to the software.

## 2024-02-27 NOTE — LETTER
2/27/2024         RE: Jaime Saucedo  [id number] [facility] [id number]        Dear Colleague,    Thank you for referring your patient, Jaime Saucedo, to the [facility] [place]. Please see a copy of my visit note below.    NEUROLOGY OUTPATIENT PROGRESS NOTE   Feb 27, 2024     CHIEF COMPLAINT/REASON FOR VISIT/REASON FOR CONSULT  Patient presents with:  Tremors: Follow up    REASON FOR CONSULTATION- Leg weakness    REFERRAL SOURCE  Dr. Gayla Griffith  CC Dr. Gayla Griffith    HISTORY OF PRESENT ILLNESS  Jaime Saucedo is a 73 year old male seen today for evaluation of leg weakness.  He reports that the symptoms came on in April 2022.  In April 2021 he did have an episode of COVID infection.  Did have some long-haul symptoms.  Right leg is more prominently involved than the left leg.  Symptoms have been progressive since then.  Does complain of some minor back pain.  No symptoms in his hands.  Does not drop things with his hands though there might be some difficulty with the right hand with squeezing things.  Does complain of some loss of balance at times.  Does have some poor vision which might be leading to loss of balance.  Does report some stiffness in his legs along with some numbness in the feet.  He did have a EMG done through [facility] neurology which was negative for neuropathy.  He was started on Mirapex since he does not have a resting tremor on the right side for possible Parkinson's disease.  This has been helping occasionally it does help significantly at nighttime for restless leg syndrome.  Denies any major neck pain.    2/27/24  Patient returns today.  Feels that the symptoms are about the same.  No progression or any new symptoms.  Complains of balance issues.  Feels that that he has a plugged ear.  Wants to follow-up with the ENT provider.  I did put in a referral for him.  Pramipexole is helping at nighttime with the symptoms.  Discussed  mechanism of action of pramipexole.  Does not want to try medications for right now and wants to get a DaTscan.  No new concerns.    Previous history is reviewed and this is unchanged.    PAST MEDICAL/SURGICAL HISTORY  Past Medical History:   Diagnosis Date     Hypertension      Obstructive sleep apnea syndrome 6/1/2011     Pneumonia due to 2019 novel coronavirus 3/26/2021     Patient Active Problem List   Diagnosis     Hypertension goal BP (blood pressure) < 140/90     Diplopia     Vertigo     Hyperlipidemia LDL goal <130     Restless legs syndrome (RLS)     Obstructive sleep apnea syndrome     Parkinson's disease with dyskinesia and fluctuating manifestations   Significant for sleep disorder, vision symptoms, arthritis, tremors from Parkinson's disease, high blood pressure, high cholesterol    FAMILY HISTORY  Family History   Problem Relation Age of Onset     Hypertension Mother      Hypertension Father    Reviewed and negative for neurological problems    SOCIAL HISTORY  Social History     Tobacco Use     Smoking status: Never     Smokeless tobacco: Never   Vaping Use     Vaping Use: Never used   Substance Use Topics     Alcohol use: Yes     Comment: rare     Drug use: No       SYSTEMS REVIEW  Twelve-system ROS was done and other than the HPI this was negative/positive for back pain, arm and leg pain, joint pain, numbness/tingling, weakness paralysis, difficulty walking, balance/coordination problems, movement disorder, ringing in the ears, hearing loss, vision symptoms, sleepiness during the day, sleeping problems, bowel problems.  No new symptoms/issues.    MEDICATIONS  losartan (COZAAR) 25 MG tablet, Take 0.5 tablets (12.5 mg) by mouth daily  pramipexole (MIRAPEX) 0.25 MG tablet, Take 1 tablet (0.25 mg) by mouth 3 times daily  sildenafil (REVATIO) 20 MG tablet, 1-2 tablets 1 hour prior to sexual intercourse.  simvastatin (ZOCOR) 40 MG tablet, Take 1 tablet (40 mg) by mouth at bedtime    No current  facility-administered medications on file prior to visit.       PHYSICAL EXAMINATION  VITALS: /74   Pulse 62   Wt 79 kg (174 lb 1.6 oz)   BMI 28.97 kg/m    GENERAL: Healthy appearing, alert, no acute distress, normal habitus.  CARDIOVASCULAR: Extremities warm and well perfused. Pulses present.   NEUROLOGICAL:  Patient is awake and oriented to self, place and time.  Attention span is normal.  Memory is grossly intact.  Language is fluent and follows commands appropriately.  Appropriate fund of knowledge. Cranial nerves 2-12 are intact. There is no pronator drift.  Motor exam shows 5/5 strength in all extremities.  Tone is symmetric in the lower extremities.  Mild resting right upper and lower extremity tremor present.  Cogwheeling present on the right and left upper extremity.  Reflexes are brisker on the right side and 2+ brisk in upper extremities and lower extremities. Sensory exam is grossly intact to light touch, pin prick and vibration.  Finger to nose and heel to shin is without dysmetria.  Romberg is negative.  Gait is normal and the patient is able to do tandem walk and walk on toes and heels with some difficulty.  Slight decreased arm swing.  Exam stable compared to before.    DIAGNOSTICS  MRI Brain 2022  CONCLUSION:   1. No acute infarcts, mass lesions or hemorrhage.   2.  Moderate burden of patchy and punctate deep white matter abnormality in both cerebral hemispheres, likely reflecting chronic small vessel ischemic disease, slightly increased overall since the 03/26/2021 comparison study.     CTA H&N  IMPRESSION:  1. Negative CT angiography of the head and neck.  2. Bilateral groundglass infiltrates in the upper lung zones. This can  be seen in COVID-19 patients, although other pneumonias can also give  this process.    RELEVANT LABS  TSH 2.33  B12 766  MILA positive  ESR negative  CRP negative  Serum protein electrophoresis negative  A1c 5.4  Methylmalonic acid 131  Ferritin  256    EMG      OUTSIDE RECORDS  Outside referral notes and chart notes were reviewed and pertinent information has been summarized (in addition to the HPI):-      MRI C spine-images reviewed.  No major spinal stenosis.  IMPRESSION:  1. Multilevel degenerative changes of the cervical spine, as  described.  2. No spinal canal stenosis.  3. Mild/moderate left neural foraminal stenosis at C4-C5. Mild neural  foraminal narrowing elsewhere.  4. No spinal cord signal abnormality identified. No abnormal  enhancement seen.       MRI brain-images reviewed.  No major structural lesions noted  Significant T2 hyperintensities  IMPRESSION:  1. No definite acute intracranial process. Specifically, no findings  to suggest acute/early subacute infarct, intracranial hemorrhage,  extra axial fluid collection, or mass effect.  2. Brain atrophy and presumed chronic small vessel ischemic changes,  as described.  3. Diminished appearance of the T2 flow-voids within the lateral left  transverse and sigmoid dural venous sinuses, nonspecific. This may be  due to incidental slow flow or possibly stenosis. The possibility of  age-indeterminate dural venous sinus thrombosis/occlusion is difficult  to entirely excluded. If clinically warranted, a CT or MR venogram  with contrast could be considered for further evaluation.     Component      Latest Ref Rng 1/22/2024  12:43 PM   Protein Total      6.4 - 8.3 g/dL 7.7    Albumin      3.5 - 5.2 g/dL 4.3    Bilirubin Total      <=1.2 mg/dL 0.5    Alkaline Phosphatase      40 - 150 U/L 85    AST      0 - 45 U/L 19    ALT      0 - 70 U/L 22    Bilirubin Direct      0.00 - 0.30 mg/dL <0.20    Neutrophil Cytoplasmic Antibody      <1:10  <1:10    Neutrophil Cytoplasmic Antibody Pattern The ANCA IFA is <1:10. No further testing will be performed.    Vitamin B12      232 - 1,245 pg/mL 876    TSH      0.30 - 4.20 uIU/mL 2.88    Hemoglobin A1C      <5.7 % 5.3    Ceruloplasmin      20 - 60 mg/dL 17 (L)     Copper      70.0 - 140.0 ug/dL 98.5    Angiotensin Converting Enzyme      16 - 85 U/L 30    Lyme Disease Antibodies Serum      <0.90  0.05       Legend:  (L) Low    IMPRESSION/REPORT/PLAN  Right leg weakness  Resting tremor  Parkinsonism, unspecified Parkinsonism type  Hyperreflexia  T2 hyperintensities on MRI  Low ceruloplasmin    This is a 73 year old male with progressive right-sided weakness with a right-sided resting tremor and cogwheeling in both upper extremities.  Exam further shows significant hyperreflexia more on the right side than the left.  Symptoms are not completely consistent with Parkinson's disease though could be a Parkinson's plus syndrome like cortical basal degeneration.  Could be secondary parkinsonism as well.    MRI brain overall noncontributory though does show some T2 hyperintensities.  Some of them are in a watershed pattern we will check a CT angiogram.  There are some concerns for venous occlusion and we will check a CT venogram as well.  Previous EMG was negative for neurological causes.  MRI C-spine did not show any spinal stenosis to explain the symptoms.  Blood work has been noncontributory except for low ceruloplasmin.  Will put him on a copper supplement for a month to see if it helps.    Since the pramipexole is helping he might have Parkinson's disease though he wants to get a DaTscan to confirm before trying Sinemet.  Will arrange.    He can continue the Mirapex for restless leg syndrome and possible Parkinson's disease in the meantime.  Need to stop this for the DaTscan.    Refer to ENT to evaluate for balance issues possibly coming from his ear.    Return back in 2 months.  Recommend exercise and healthy lifestyle.    -     copper gluconate 2 MG tablet; Take 1 tablet (2 mg) by mouth daily  -     NM Brain Imaging Tomographic (Spect) Datscan  -     CTA Head Neck with Contrast; Future  -     CTV Head Neck w Contrast; Future  -     Adult ENT  Referral;  Future    Return in about 2 months (around 4/27/2024) for In-Clinic Visit (must), Add on PAOR, After testing.    Over 45 minutes were spent coordinating the care for the patient on the day of the encounter.  This includes previsit, during visit and post visit activities as documented above.  Counseling patient.  Multiple MRIs reviewed.  Testing ordered.  High risk.  Prescription management.  (Activities include but not inclusive of reviewing chart, reviewing outside records, reviewing labs and imaging study results as well as the images, patient visit time including getting history and exam,  use if applicable, review of test results with the patient and coming up with a plan in a shared model, counseling patient and family, education and answering patient questions, EMR , EMR diagnosis entry and problem list management, medication reconciliation and prescription management if applicable, paperwork if applicable, printing documents and documentation of the visit activities.)        Nigel Page MD  Neurologist  [facility]  Tel:- [phone]    This note was dictated using voice recognition software.  Any grammatical or context distortions are unintentional and inherent to the software.        Again, thank you for allowing me to participate in the care of your patient.        Sincerely,        Nigel Page MD

## 2024-02-27 NOTE — NURSING NOTE
Chief Complaint   Patient presents with    Tremors     Follow up     Kia Yeager on 2/27/2024 at 12:45 PM

## 2024-02-29 ENCOUNTER — TELEPHONE (OUTPATIENT)
Dept: NEUROLOGY | Facility: CLINIC | Age: 74
End: 2024-02-29
Payer: COMMERCIAL

## 2024-02-29 DIAGNOSIS — G20.C PARKINSONISM, UNSPECIFIED PARKINSONISM TYPE (H): Primary | ICD-10-CM

## 2024-02-29 RX ORDER — CARBIDOPA AND LEVODOPA 25; 100 MG/1; MG/1
1 TABLET ORAL 3 TIMES DAILY
Qty: 90 TABLET | Refills: 1 | Status: SHIPPED | OUTPATIENT
Start: 2024-02-29 | End: 2024-05-14

## 2024-02-29 NOTE — TELEPHONE ENCOUNTER
Per OV 2/27/24 patient wanted to complete DaTscan prior to trying Sinemet. DaTscan isn't scheduled until 5/21/24. Patient would like to trial Sinemet prior to testing.     Please note: If he starts this medication, he will need to discontinue the Sinemet 12 hours prior to DaTscan- Yes or no?    Please advise  Jaylin Altamirano CMA on 2/29/2024 at 1:49 PM  [facility]

## 2024-02-29 NOTE — TELEPHONE ENCOUNTER
Health Call Center    Phone Message    May a detailed message be left on voicemail: yes     Reason for Call: Medication Question or concern regarding medication   Prescription Clarification  Name of Medication: Parkinson medication  Prescribing Provider: Nigel Page   Pharmacy: N/A   What on the order needs clarification?     Pt is calling to request starting Parkinsons medications prior to Datscan.     Please call to advise at # [phone].      Action Taken: Message routed to:  Other: [facility]    Travel Screening: Not Applicable

## 2024-02-29 NOTE — TELEPHONE ENCOUNTER
1.  Medication prescribed  2.  Can stop it before the scan.  3.  Can you please move his follow-up appointment to after the DaTscan.

## 2024-03-01 NOTE — TELEPHONE ENCOUNTER
Left message for patient with the information that script was sent, stop the med 12 hours prior to scan, and to call back to reschedule follow up for after the scan.

## 2024-03-04 ENCOUNTER — TELEPHONE (OUTPATIENT)
Dept: NEUROLOGY | Facility: CLINIC | Age: 74
End: 2024-03-04
Payer: COMMERCIAL

## 2024-03-04 NOTE — TELEPHONE ENCOUNTER
[facility] Call Center    Phone Message    May a detailed message be left on voicemail: yes     Reason for Call: Medication Question or concern regarding medication   Prescription Clarification    Name of Medication:   carbidopa-levodopa (losartan (COZAAR) 25 MG tablet   simvastatin (ZOCOR) 40 MG table    pramipexole (MIRAPEX) 0.25 MG    losartan (COZAAR) 25 MG tablet     Prescribing Provider:   Dr. Page      What on the order needs clarification? Patient is concerned if he should be taking all of the mediations listed above together or discontinuing any of these.     Please reach out to Pt and advise.  Pt can be reached at: [phone]        Action Taken: Other: Neurology    Travel Screening: Not Applicable

## 2024-03-05 NOTE — TELEPHONE ENCOUNTER
Called and spoke to patient giving them information about medications (okay to take them all concurrently per provider, see previous note).    Patient states understanding.    Jorge Milton RN, BSN  [facility]

## 2024-03-06 NOTE — TELEPHONE ENCOUNTER
Patient has been spoken to about medications and how to take them (when to discontinue prior to DatScan).    Jorge Milton RN, BSN  [facility]rology

## 2024-03-13 ENCOUNTER — HOSPITAL ENCOUNTER (OUTPATIENT)
Dept: CT IMAGING | Facility: CLINIC | Age: 74
Discharge: HOME | End: 2024-03-13
Attending: PSYCHIATRY & NEUROLOGY
Payer: COMMERCIAL

## 2024-03-13 DIAGNOSIS — R93.89 ABNORMAL MRI: ICD-10-CM

## 2024-03-13 DIAGNOSIS — R90.89 T2 HYPERINTENSE FOCI PRESENT IN CEREBRAL CORTEX ON MAGNETIC RESONANCE IMAGING: ICD-10-CM

## 2024-03-13 LAB
CREAT BLD-MCNC: 1.3 MG/DL (ref 0.7–1.3)
EGFRCR SERPLBLD CKD-EPI 2021: 59 ML/MIN/1.73M2

## 2024-03-13 PROCEDURE — 250N000009 HC RX 250: Performed by: RADIOLOGY

## 2024-03-13 PROCEDURE — 70496 CT ANGIOGRAPHY HEAD: CPT | Mod: XS

## 2024-03-13 PROCEDURE — 70496 CT ANGIOGRAPHY HEAD: CPT

## 2024-03-13 PROCEDURE — 250N000011 HC RX IP 250 OP 636: Performed by: RADIOLOGY

## 2024-03-13 PROCEDURE — 82565 ASSAY OF CREATININE: CPT

## 2024-03-13 RX ORDER — IOPAMIDOL 755 MG/ML
67 INJECTION, SOLUTION INTRAVASCULAR ONCE
Status: COMPLETED | OUTPATIENT
Start: 2024-03-13 | End: 2024-03-13

## 2024-03-13 RX ADMIN — SODIUM CHLORIDE 100 ML: 9 INJECTION, SOLUTION INTRAVENOUS at 13:13

## 2024-03-13 RX ADMIN — IOPAMIDOL 67 ML: 755 INJECTION, SOLUTION INTRAVENOUS at 13:13

## 2024-03-26 ENCOUNTER — TELEPHONE (OUTPATIENT)
Dept: NEUROLOGY | Facility: CLINIC | Age: 74
End: 2024-03-26
Payer: COMMERCIAL

## 2024-03-26 NOTE — TELEPHONE ENCOUNTER
[facility]    Phone Message    May a detailed message be left on voicemail: yes     Reason for Call: Requesting Results     Name/type of test: CT scan   Date of test: 3/13/24  Was test done at a location other than [facility] (Please fill in the location if not [facility])?: No    Pt is requesting a call back from the care team as he would like to discuss his CT results. Please contact Pt at [phone] to discuss futher    Action Taken: Message routed to:  Other: [facility]     Travel Screening: Not Applicable

## 2024-03-26 NOTE — TELEPHONE ENCOUNTER
Called and spoke with patient.  He states that he is wondering about the results that he was mailed.   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     What does this mean? Are there any risks? Is there anything that he needs to do?

## 2024-03-27 NOTE — TELEPHONE ENCOUNTER
1.  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  2.  This is not causing any symptoms and there is no risk associated with this.  3.  There is nothing that he needs to do about this.  It will stay like this forever.

## 2024-03-27 NOTE — TELEPHONE ENCOUNTER
Called and spoke with patient and relayed the message with clarification from the provider. He verbalized understanding. No other questions at this time and appreciated the clarification.

## 2024-04-17 ENCOUNTER — NURSE TRIAGE (OUTPATIENT)
Dept: FAMILY MEDICINE | Facility: CLINIC | Age: 74
End: 2024-04-17
Payer: COMMERCIAL

## 2024-04-17 NOTE — TELEPHONE ENCOUNTER
"S-(situation): bilat ear congestion x 6 weeks    B-(background): pt had sinus inf last year that lasted 3 mos. He made an appt with ENT when the ear sx's started but cannot get in until 9/25 & he cannot wait that long    A-(assessment): every time pt swallows or yawns he has \"gurgling\" in his ears. Ears not draining. Has trouble hearing. No pain but uncomfortable & annoying. There is ringing in ears. No sinus pressure in face, no headache, no fever. No allergies noted.    R-(recommendations): protocol advises to be seen in 3 days. Pt agrees & would like to be seen. Pt was scheduled tomorrow with PCP.    Sandra Santos RN        Reason for Disposition   Ear congestion present > 48 hours    Answer Assessment - Initial Assessment Questions  1. LOCATION: \"Which ear is involved?\"        Both ears  2. SENSATION: \"Describe how the ear feels.\" (e.g. stuffy, full, plugged).\"       Feels gurgling when swallowing or yawning. Has had balance issues x 1 yr. Doesn't feel like ears are draining  3. ONSET:  \"When did the ear symptoms start?\"        6 weeks  4. PAIN: \"Do you also have an earache?\" If Yes, ask: \"How bad is it?\" (Scale 1-10; or mild, moderate, severe)      No pain but annoying & uncomfortable. It has affected his hearing & there is ringing  5. CAUSE: \"What do you think is causing the ear congestion?\"      ?   6. URI: \"Do you have a runny nose or cough?\"       Very slight runny nose. No cough. No fever. No sinus pressure in face. No ha. Terrible sinus inf 1 yr ago for 3 mos.  7. NASAL ALLERGIES: \"Are there symptoms of hay fever, such as sneezing or a clear nasal discharge?\"      No allergies noted  8. PREGNANCY: \"Is there any chance you are pregnant?\" \"When was your last menstrual period?\"    Protocols used: Ear - Congestion-A-OH    "

## 2024-04-18 ENCOUNTER — OFFICE VISIT (OUTPATIENT)
Dept: FAMILY MEDICINE | Facility: CLINIC | Age: 74
End: 2024-04-18
Payer: COMMERCIAL

## 2024-04-18 VITALS
BODY MASS INDEX: 29.09 KG/M2 | DIASTOLIC BLOOD PRESSURE: 82 MMHG | OXYGEN SATURATION: 98 % | WEIGHT: 174.6 LBS | RESPIRATION RATE: 16 BRPM | SYSTOLIC BLOOD PRESSURE: 132 MMHG | HEIGHT: 65 IN | HEART RATE: 68 BPM | TEMPERATURE: 97 F

## 2024-04-18 DIAGNOSIS — G20.B2 PARKINSON'S DISEASE WITH DYSKINESIA AND FLUCTUATING MANIFESTATIONS (H): ICD-10-CM

## 2024-04-18 DIAGNOSIS — H69.93 DYSFUNCTION OF BOTH EUSTACHIAN TUBES: Primary | ICD-10-CM

## 2024-04-18 PROCEDURE — 99214 OFFICE O/P EST MOD 30 MIN: CPT | Performed by: FAMILY MEDICINE

## 2024-04-18 RX ORDER — RESPIRATORY SYNCYTIAL VIRUS VACCINE 120MCG/0.5
0.5 KIT INTRAMUSCULAR ONCE
Qty: 1 EACH | Refills: 0 | Status: CANCELLED | OUTPATIENT
Start: 2024-04-18 | End: 2024-04-18

## 2024-04-18 RX ORDER — FLUTICASONE PROPIONATE 50 MCG
2 SPRAY, SUSPENSION (ML) NASAL DAILY
Qty: 16 G | Refills: 1 | Status: SHIPPED | OUTPATIENT
Start: 2024-04-18 | End: 2024-08-15

## 2024-04-18 ASSESSMENT — PAIN SCALES - GENERAL: PAINLEVEL: NO PAIN (0)

## 2024-04-18 NOTE — NURSING NOTE
"Initial /82   Pulse 68   Temp 97  F (36.1  C) (Tympanic)   Resp 16   Ht 1.651 m (5' 5\")   Wt 79.2 kg (174 lb 9.6 oz)   SpO2 98%   BMI 29.05 kg/m   Estimated body mass index is 29.05 kg/m  as calculated from the following:    Height as of this encounter: 1.651 m (5' 5\").    Weight as of this encounter: 79.2 kg (174 lb 9.6 oz). .    "

## 2024-04-18 NOTE — PROGRESS NOTES
"  Assessment & Plan     Dysfunction of both eustachian tubes  Suspect eustachian tube dysfunction.  Start Flonase.  Instructed in Valsalva.  Do this frequently once able to do it easily and comfortably.  Stop Flonase once symptoms resolve.  Follow-up with ENT if persistent.  - fluticasone (FLONASE) 50 MCG/ACT nasal spray; Spray 2 sprays into both nostrils daily    Known higher risk conditions that I take into account for medical decision making:   Parkinson's disease with dyskinesia and fluctuating manifestations (H)  Progressive worsening.  Working with neurology.            BMI  Estimated body mass index is 29.05 kg/m  as calculated from the following:    Height as of this encounter: 1.651 m (5' 5\").    Weight as of this encounter: 79.2 kg (174 lb 9.6 oz).             Migel Sandoval is a 73 year old, presenting for the following health issues:  Ear Problem        4/18/2024     9:04 AM   Additional Questions   Roomed by Malorie LOZADA CMA     History of Present Illness       Reason for visit:  Plugged  Symptom onset:  More than a month  Symptoms include:  Plugged ears  Symptom intensity:  Moderate  Symptom progression:  Staying the same  Had these symptoms before:  Yes  Has tried/received treatment for these symptoms:  Yes  Previous treatment was successful:  Yes  Prior treatment description:  Anti biotics  What makes it worse:  No  What makes it better:  No    He eats 0-1 servings of fruits and vegetables daily.He consumes 0 sweetened beverage(s) daily.He exercises with enough effort to increase his heart rate 30 to 60 minutes per day.  He exercises with enough effort to increase his heart rate 7 days per week.   He is taking medications regularly.               ROS:   Constitutional, neuro, ENT, endocrine, pulmonary, cardiac, gastrointestinal, genitourinary, musculoskeletal, integument and psychiatric systems are negative, except as otherwise noted.       Objective    /82   Pulse 68   Temp 97  F (36.1  C) " "(Tympanic)   Resp 16   Ht 1.651 m (5' 5\")   Wt 79.2 kg (174 lb 9.6 oz)   SpO2 98%   BMI 29.05 kg/m    Body mass index is 29.05 kg/m .  Physical Exam   GENERAL: Pleasant, well appearing male.  HEENT: Scant serous effusion bilateral TMs.            Signed Electronically by: Adalberto Gautam MD    "

## 2024-05-03 ENCOUNTER — OFFICE VISIT (OUTPATIENT)
Dept: FAMILY MEDICINE | Facility: CLINIC | Age: 74
End: 2024-05-03
Payer: COMMERCIAL

## 2024-05-03 ENCOUNTER — NURSE TRIAGE (OUTPATIENT)
Dept: FAMILY MEDICINE | Facility: CLINIC | Age: 74
End: 2024-05-03

## 2024-05-03 VITALS
SYSTOLIC BLOOD PRESSURE: 122 MMHG | HEIGHT: 65 IN | DIASTOLIC BLOOD PRESSURE: 76 MMHG | BODY MASS INDEX: 28.99 KG/M2 | RESPIRATION RATE: 16 BRPM | HEART RATE: 70 BPM | WEIGHT: 174 LBS | OXYGEN SATURATION: 99 % | TEMPERATURE: 97.9 F

## 2024-05-03 DIAGNOSIS — H00.015 HORDEOLUM EXTERNUM OF LEFT LOWER EYELID: Primary | ICD-10-CM

## 2024-05-03 PROCEDURE — 99213 OFFICE O/P EST LOW 20 MIN: CPT | Performed by: FAMILY MEDICINE

## 2024-05-03 RX ORDER — POLYMYXIN B SULFATE AND TRIMETHOPRIM 1; 10000 MG/ML; [USP'U]/ML
SOLUTION OPHTHALMIC
Qty: 10 ML | Refills: 0 | Status: SHIPPED | OUTPATIENT
Start: 2024-05-03 | End: 2024-05-10

## 2024-05-03 RX ORDER — RESPIRATORY SYNCYTIAL VIRUS VACCINE 120MCG/0.5
0.5 KIT INTRAMUSCULAR ONCE
Qty: 1 EACH | Refills: 0 | Status: CANCELLED | OUTPATIENT
Start: 2024-05-03 | End: 2024-05-03

## 2024-05-03 RX ORDER — ERYTHROMYCIN 5 MG/G
0.5 OINTMENT OPHTHALMIC AT BEDTIME
Qty: 3.5 G | Refills: 0 | Status: SHIPPED | OUTPATIENT
Start: 2024-05-03

## 2024-05-03 RX ORDER — BACITRACIN ZINC AND POLYMYXIN B SULFATE 500; 10000 [USP'U]/G; [USP'U]/G
0.5 OINTMENT OPHTHALMIC AT BEDTIME
Qty: 3.5 G | Refills: 0 | Status: SHIPPED | OUTPATIENT
Start: 2024-05-03 | End: 2024-05-10

## 2024-05-03 ASSESSMENT — PAIN SCALES - GENERAL: PAINLEVEL: NO PAIN (0)

## 2024-05-03 ASSESSMENT — ENCOUNTER SYMPTOMS: EYE PAIN: 1

## 2024-05-03 NOTE — PROGRESS NOTES
"  Assessment & Plan     Hordeolum externum of left lower eyelid  Treat as below.  Follow-up if not improved or worse.  - erythromycin (ROMYCIN) 5 MG/GM ophthalmic ointment; Place 0.5 inches into both eyes at bedtime            BMI  Estimated body mass index is 28.96 kg/m  as calculated from the following:    Height as of this encounter: 1.651 m (5' 5\").    Weight as of this encounter: 78.9 kg (174 lb).             Migel Sandoval is a 73 year old, presenting for the following health issues:  Eye Problem and Conjunctivitis        5/3/2024     2:40 PM   Additional Questions   Roomed by Malorie LOZADA CMA     History of Present Illness       Reason for visit:  Eye infection  Symptom onset:  1-3 days ago    He eats 0-1 servings of fruits and vegetables daily.He consumes 0 sweetened beverage(s) daily.He exercises with enough effort to increase his heart rate 30 to 60 minutes per day.  He exercises with enough effort to increase his heart rate 5 days per week.   He is taking medications regularly.         Eye(s) Problem  Onset/Duration: 2 nights  Description:   Location: Bilateral  Pain: YES  Redness: YES  Accompanying Signs & Symptoms:  Discharge/mattering: YES  Swelling: YES  Visual changes: No  Fever: No  Nasal Congestion: No  Bothered by bright lights: No  History:  Trauma: No  Foreign body exposure: No  Wearing contacts: No  Precipitating or alleviating factors: None  Therapies tried and outcome: None      ROS:   Constitutional, neuro, ENT, endocrine, pulmonary, cardiac, gastrointestinal, genitourinary, musculoskeletal, integument and psychiatric systems are negative, except as otherwise noted.       Objective    /76   Pulse 70   Temp 97.9  F (36.6  C) (Tympanic)   Resp 16   Ht 1.651 m (5' 5\")   Wt 78.9 kg (174 lb)   SpO2 99%   BMI 28.96 kg/m    Body mass index is 28.96 kg/m .  Physical Exam   GENERAL: Pleasant, well appearing male.  HEENT: PERRL, EOMI, stye left lower eyelid.          Signed " Electronically by: Adalberto Gautam MD

## 2024-05-03 NOTE — TELEPHONE ENCOUNTER
Patient is calling due to having bilateral issues in his eyes. Left eye is worse.     Redness in left eye  Eyelid swollen mild to moderate  Matted shut in the morning'  Yellow drainage  Watery eyes  Burning sensation  Discomfort  Blurred vision  Left eyelid might have a sty     Has had similar issues a few times; past issues was maybe 5 years ago.     Patient wears glasses for reading  Reason for Disposition   Blurred vision    Additional Information   Negative: Eye exposure to chemical or fumes   Negative: Redness of white of eye (sclera), but no pus or only a small amount of brief pus   Negative: SEVERE pain (e.g., excruciating)   Negative: Patient sounds very sick or weak to the triager   Negative: MODERATE eye pain (e.g., interferes with normal activities)   Negative: Looking at light causes MODERATE to SEVERE eye pain (i.e., photophobia)    Protocols used: Eye - Pus or [id number]    Patient was able to be scheduled for an a cute appointment with Dr. Gautam today at 2:40 pm.    Cary Smalls RN on 5/3/2024 at 9:09 AM

## 2024-05-03 NOTE — NURSING NOTE
"Initial /76   Pulse 70   Temp 97.9  F (36.6  C) (Tympanic)   Resp 16   Ht 1.651 m (5' 5\")   Wt 78.9 kg (174 lb)   SpO2 99%   BMI 28.96 kg/m   Estimated body mass index is 28.96 kg/m  as calculated from the following:    Height as of this encounter: 1.651 m (5' 5\").    Weight as of this encounter: 78.9 kg (174 lb). .    "

## 2024-05-03 NOTE — PATIENT INSTRUCTIONS
"* * *    Per federal transparency in medicine laws, lab and imaging results are released \"real time\" into My Chart.  This may mean that you see the results before I have a chance to review them. My Chart will alert you again when I review the lab and enter comments.  Sometimes with imaging or labs there may be serious or unexpected results. Critical results are paged to me or the after hours on-call provider so that they can be reviewed immediately.  This is not true of non-critical abnormal results. Unfortunately, this means that it's possible you may be alerted of a serious finding before I have a chance to review it.  If you ever receive a result that you are concerned about and I have not already contacted you, please feel free to reach out to me or the care team so that you get the answers you need.    Additionally, it is my goal that you understand the care plan discussed at your visit and that any questions you have are answered.  Please feel free to reach out if you need clarification or explanation of any information addressed at your office visit.    "

## 2024-05-08 ENCOUNTER — DOCUMENTATION ONLY (OUTPATIENT)
Dept: CARDIOLOGY | Facility: CLINIC | Age: 74
End: 2024-05-08
Payer: COMMERCIAL

## 2024-05-14 ENCOUNTER — OFFICE VISIT (OUTPATIENT)
Dept: NEUROLOGY | Facility: CLINIC | Age: 74
End: 2024-05-14
Payer: COMMERCIAL

## 2024-05-14 VITALS
DIASTOLIC BLOOD PRESSURE: 82 MMHG | HEIGHT: 66 IN | SYSTOLIC BLOOD PRESSURE: 130 MMHG | HEART RATE: 52 BPM | WEIGHT: 170 LBS | BODY MASS INDEX: 27.32 KG/M2

## 2024-05-14 DIAGNOSIS — G20.C PARKINSONISM, UNSPECIFIED PARKINSONISM TYPE (H): Primary | ICD-10-CM

## 2024-05-14 DIAGNOSIS — R90.89 T2 HYPERINTENSE FOCI PRESENT IN CEREBRAL CORTEX ON MAGNETIC RESONANCE IMAGING: ICD-10-CM

## 2024-05-14 DIAGNOSIS — G25.2 RESTING TREMOR: ICD-10-CM

## 2024-05-14 PROCEDURE — 99214 OFFICE O/P EST MOD 30 MIN: CPT | Performed by: PSYCHIATRY & NEUROLOGY

## 2024-05-14 NOTE — LETTER
5/14/2024         RE: Jaime Saucedo  [address] [facility] [id number]        Dear Colleague,    Thank you for referring your patient, Jaime Saucedo, to the [facility] [place]. Please see a copy of my visit note below.    NEUROLOGY OUTPATIENT PROGRESS NOTE   May 14, 2024     CHIEF COMPLAINT/REASON FOR VISIT/REASON FOR CONSULT  Patient presents with:  Extremity Weakness: Patient states that his symptoms are the same as last visit.     REASON FOR CONSULTATION- Leg weakness    REFERRAL SOURCE  Dr. Gayla Griffith  CC Dr. Gayla Griffith    HISTORY OF PRESENT ILLNESS  Jaime Saucedo is a 73 year old male seen today for evaluation of leg weakness.  He reports that the symptoms came on in April 2022.  In April 2021 he did have an episode of COVID infection.  Did have some long-haul symptoms.  Right leg is more prominently involved than the left leg.  Symptoms have been progressive since then.  Does complain of some minor back pain.  No symptoms in his hands.  Does not drop things with his hands though there might be some difficulty with the right hand with squeezing things.  Does complain of some loss of balance at times.  Does have some poor vision which might be leading to loss of balance.  Does report some stiffness in his legs along with some numbness in the feet.  He did have a EMG done through [facility] neurology which was negative for neuropathy.  He was started on Mirapex since he does not have a resting tremor on the right side for possible Parkinson's disease.  This has been helping occasionally it does help significantly at nighttime for restless leg syndrome.  Denies any major neck pain.    2/27/24  Patient returns today.  Feels that the symptoms are about the same.  No progression or any new symptoms.  Complains of balance issues.  Feels that that he has a plugged ear.  Wants to follow-up with the ENT provider.  I did put in a referral for him.   Pramipexole is helping at nighttime with the symptoms.  Discussed mechanism of action of pramipexole.  Does not want to try medications for right now and wants to get a DaTscan.  No new concerns.    5/14/24  Patient returns today  1.  He reports his symptoms are about the same as before.  He did try the Sinemet which puts him to sleep.  Has not noticed any benefit.  The pramipexole does help somewhat with the symptoms though he does feel tired and sleepy.  Is only taking it during the daytime.  DaTscan is scheduled for next week  2.  He did complete the CT angiogram of/CTV.  No concerning findings  3.  Reports no other new symptoms.    Previous history is reviewed and this is unchanged.    PAST MEDICAL/SURGICAL HISTORY  Past Medical History:   Diagnosis Date     Hypertension      Obstructive sleep apnea syndrome 6/1/2011     Pneumonia due to 2019 novel coronavirus 3/26/2021     Patient Active Problem List   Diagnosis     Hypertension goal BP (blood pressure) < 140/90     Diplopia     Vertigo     Hyperlipidemia LDL goal <130     Restless legs syndrome (RLS)     Obstructive sleep apnea syndrome     Parkinson's disease with dyskinesia and fluctuating manifestations (H)   Significant for sleep disorder, vision symptoms, arthritis, tremors from Parkinson's disease, high blood pressure, high cholesterol    FAMILY HISTORY  Family History   Problem Relation Age of Onset     Hypertension Mother      Hypertension Father    Reviewed and negative for neurological problems    SOCIAL HISTORY  Social History     Tobacco Use     Smoking status: Never     Smokeless tobacco: Never   Vaping Use     Vaping status: Never Used   Substance Use Topics     Alcohol use: Yes     Comment: rare     Drug use: No       SYSTEMS REVIEW  Twelve-system ROS was done and other than the HPI this was negative/positive for back pain, arm and leg pain, joint pain, numbness/tingling, weakness paralysis, difficulty walking, balance/coordination problems,  "movement disorder, ringing in the ears, hearing loss, vision symptoms, sleepiness during the day, sleeping problems, bowel problems.  No new concerns/issues.    MEDICATIONS  Current Outpatient Medications   Medication Sig Dispense Refill     erythromycin (ROMYCIN) 5 MG/GM ophthalmic ointment Place 0.5 inches into both eyes at bedtime 3.5 g 0     fluticasone (FLONASE) 50 MCG/ACT nasal spray Spray 2 sprays into both nostrils daily 16 g 1     losartan (COZAAR) 25 MG tablet Take 0.5 tablets (12.5 mg) by mouth daily 45 tablet 4     pramipexole (MIRAPEX) 0.25 MG tablet Take 1 tablet (0.25 mg) by mouth 3 times daily 90 tablet 4     sildenafil (REVATIO) 20 MG tablet 1-2 tablets 1 hour prior to sexual intercourse. 10 tablet 11     simvastatin (ZOCOR) 40 MG tablet Take 1 tablet (40 mg) by mouth at bedtime 90 tablet 4     copper gluconate 2 MG tablet Take 1 tablet (2 mg) by mouth daily (Patient not taking: Reported on 4/18/2024) 30 tablet 0     No current facility-administered medications for this visit.        PHYSICAL EXAMINATION  VITALS: /82   Pulse 52   Ht 1.676 m (5' 6\")   Wt 77.1 kg (170 lb)   BMI 27.44 kg/m    GENERAL: Healthy appearing, alert, no acute distress, normal habitus.  CARDIOVASCULAR: Extremities warm and well perfused. Pulses present.   NEUROLOGICAL:  Patient is awake and oriented to self, place and time.  Attention span is normal.  Memory is grossly intact.  Language is fluent and follows commands appropriately.  Appropriate fund of knowledge. Cranial nerves 2-12 are intact. There is no pronator drift.  Motor exam shows 5/5 strength in all extremities.  Tone is symmetric in the lower extremities.  Mild resting right upper and lower extremity tremor present.  Cogwheeling present on the right and left upper extremity.  Reflexes are brisker on the right side and 2+ brisk in upper extremities and lower extremities. Sensory exam is grossly intact to light touch, pin prick and vibration.  Finger to nose " and heel to shin is without dysmetria.  Romberg is negative.  Gait is normal and the patient is able to do tandem walk and walk on toes and heels with some difficulty.  Slight decreased arm swing.  No change in exam today.    DIAGNOSTICS  MRI Brain 2022  CONCLUSION:   1. No acute infarcts, mass lesions or hemorrhage.   2.  Moderate burden of patchy and punctate deep white matter abnormality in both cerebral hemispheres, likely reflecting chronic small vessel ischemic disease, slightly increased overall since the 03/26/2021 comparison study.     CTA H&N  IMPRESSION:  1. Negative CT angiography of the head and neck.  2. Bilateral groundglass infiltrates in the upper lung zones. This can  be seen in COVID-19 patients, although other pneumonias can also give  this process.    RELEVANT LABS  TSH 2.33  B12 766  MILA positive  ESR negative  CRP negative  Serum protein electrophoresis negative  A1c 5.4  Methylmalonic acid 131  Ferritin 256    EMG      OUTSIDE RECORDS  Outside referral notes and chart notes were reviewed and pertinent information has been summarized (in addition to the HPI):-      MRI C spine-images reviewed.  No major spinal stenosis.  IMPRESSION:  1. Multilevel degenerative changes of the cervical spine, as  described.  2. No spinal canal stenosis.  3. Mild/moderate left neural foraminal stenosis at C4-C5. Mild neural  foraminal narrowing elsewhere.  4. No spinal cord signal abnormality identified. No abnormal  enhancement seen.       MRI brain-images reviewed.  No major structural lesions noted  Significant T2 hyperintensities  IMPRESSION:  1. No definite acute intracranial process. Specifically, no findings  to suggest acute/early subacute infarct, intracranial hemorrhage,  extra axial fluid collection, or mass effect.  2. Brain atrophy and presumed chronic small vessel ischemic changes,  as described.  3. Diminished appearance of the T2 flow-voids within the lateral left  transverse and sigmoid dural  venous sinuses, nonspecific. This may be  due to incidental slow flow or possibly stenosis. The possibility of  age-indeterminate dural venous sinus thrombosis/occlusion is difficult  to entirely excluded. If clinically warranted, a CT or MR venogram  with contrast could be considered for further evaluation.     Component      Latest Ref Rng 1/22/2024  12:43 PM   Protein Total      6.4 - 8.3 g/dL 7.7    Albumin      3.5 - 5.2 g/dL 4.3    Bilirubin Total      <=1.2 mg/dL 0.5    Alkaline Phosphatase      40 - 150 U/L 85    AST      0 - 45 U/L 19    ALT      0 - 70 U/L 22    Bilirubin Direct      0.00 - 0.30 mg/dL <0.20    Neutrophil Cytoplasmic Antibody      <1:10  <1:10    Neutrophil Cytoplasmic Antibody Pattern The ANCA IFA is <1:10. No further testing will be performed.    Vitamin B12      232 - 1,245 pg/mL 876    TSH      0.30 - 4.20 uIU/mL 2.88    Hemoglobin A1C      <5.7 % 5.3    Ceruloplasmin      20 - 60 mg/dL 17 (L)    Copper      70.0 - 140.0 ug/dL 98.5    Angiotensin Converting Enzyme      16 - 85 U/L 30    Lyme Disease Antibodies Serum      <0.90  0.05       Legend:  (L) Low    CTA  IMPRESSION:   CTA Head: No evidence of large vessel occlusion or high-grade  stenosis.   CTA Neck: No evidence of large vessel occlusion or high-grade  stenosis.     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  Probable incidental arachnoid granulation within the hypoplastic left  transverse sinus.     IMPRESSION/REPORT/PLAN  Right leg weakness  Resting tremor  Parkinsonism, unspecified Parkinsonism type  Hyperreflexia  T2 hyperintensities on MRI  Low ceruloplasmin  Medication side effects    This is a 73 year old male with progressive right-sided weakness with a right-sided resting tremor and cogwheeling in both upper extremities.  Exam further shows significant hyperreflexia more on the right side than the left.  Symptoms are  not completely consistent with Parkinson's disease though could be a Parkinson's plus syndrome like cortical basal degeneration.  Could be secondary parkinsonism as well.    MRI brain overall noncontributory though does show some T2 hyperintensities.  Some of them are in a watershed pattern we will check a CT angiogram.  CTA/CTV were noncontributory.MRI C-spine did not show any spinal stenosis to explain the symptoms.  Blood work has been noncontributory except for low ceruloplasmin.  Has not been able to try the copper supplement so far.  Will recommend multivitamin with copper.    Since the pramipexole is helping he might have Parkinson's disease though he wants to get a DaTscan.  This is pending.  Sinemet was tried in between and that caused somnolence.     He can continue the Mirapex for restless leg syndrome and possible Parkinson's disease in the meantime.  Need to stop this for the DaTscan.    Return depending on the results of the DaTscan.  Might need a referral to a tertiary center if the DaTscan comes back negative.  Recommend exercise and healthy lifestyle.  Could try non-dopamine medications depending on the results of the DaTscan.    -     Multivitamin with copper  -     NM Brain Imaging Tomographic (Spect) Datscan    Return if symptoms worsen or fail to improve, for In-Clinic Visit (must).    Over 30 minutes were spent coordinating the care for the patient on the day of the encounter.  This includes previsit, during visit and post visit activities as documented above.  Counseling patient with prescription management.  Medication side effects.  Multiple test reviewed.  (Activities include but not inclusive of reviewing chart, reviewing outside records, reviewing labs and imaging study results as well as the images, patient visit time including getting history and exam,  use if applicable, review of test results with the patient and coming up with a plan in a shared model, counseling patient  and family, education and answering patient questions, EMR , EMR diagnosis entry and problem list management, medication reconciliation and prescription management if applicable, paperwork if applicable, printing documents and documentation of the visit activities.)        Nigel Page MD  Neurologist  [facility]  Tel:- [phone]    This note was dictated using voice recognition software.  Any grammatical or context distortions are unintentional and inherent to the software.        Again, thank you for allowing me to participate in the care of your patient.        Sincerely,        Nigel Page MD

## 2024-05-14 NOTE — PROGRESS NOTES
NEUROLOGY OUTPATIENT PROGRESS NOTE   May 14, 2024     CHIEF COMPLAINT/REASON FOR VISIT/REASON FOR CONSULT  Patient presents with:  Extremity Weakness: Patient states that his symptoms are the same as last visit.     REASON FOR CONSULTATION- Leg weakness    REFERRAL SOURCE  Dr. Gayla Griffith   Dr. Gayla Griffith    HISTORY OF PRESENT ILLNESS  Jaime Saucedo is a 73 year old male seen today for evaluation of leg weakness.  He reports that the symptoms came on in April 2022.  In April 2021 he did have an episode of COVID infection.  Did have some long-haul symptoms.  Right leg is more prominently involved than the left leg.  Symptoms have been progressive since then.  Does complain of some minor back pain.  No symptoms in his hands.  Does not drop things with his hands though there might be some difficulty with the right hand with squeezing things.  Does complain of some loss of balance at times.  Does have some poor vision which might be leading to loss of balance.  Does report some stiffness in his legs along with some numbness in the feet.  He did have a EMG done through [facility] which was negative for neuropathy.  He was started on Mirapex since he does not have a resting tremor on the right side for possible Parkinson's disease.  This has been helping occasionally it does help significantly at nighttime for restless leg syndrome.  Denies any major neck pain.    2/27/24  Patient returns today.  Feels that the symptoms are about the same.  No progression or any new symptoms.  Complains of balance issues.  Feels that that he has a plugged ear.  Wants to follow-up with the ENT provider.  I did put in a referral for him.  Pramipexole is helping at nighttime with the symptoms.  Discussed mechanism of action of pramipexole.  Does not want to try medications for right now and wants to get a DaTscan.  No new concerns.    5/14/24  Patient returns today  1.  He reports his symptoms are about the same  as before.  He did try the Sinemet which puts him to sleep.  Has not noticed any benefit.  The pramipexole does help somewhat with the symptoms though he does feel tired and sleepy.  Is only taking it during the daytime.  DaTscan is scheduled for next week  2.  He did complete the CT angiogram of/CTV.  No concerning findings  3.  Reports no other new symptoms.    Previous history is reviewed and this is unchanged.    PAST MEDICAL/SURGICAL HISTORY  Past Medical History:   Diagnosis Date    Hypertension     Obstructive sleep apnea syndrome 6/1/2011    Pneumonia due to 2019 novel coronavirus 3/26/2021     Patient Active Problem List   Diagnosis    Hypertension goal BP (blood pressure) < 140/90    Diplopia    Vertigo    Hyperlipidemia LDL goal <130    Restless legs syndrome (RLS)    Obstructive sleep apnea syndrome    Parkinson's disease with dyskinesia and fluctuating manifestations (H)   Significant for sleep disorder, vision symptoms, arthritis, tremors from Parkinson's disease, high blood pressure, high cholesterol    FAMILY HISTORY  Family History   Problem Relation Age of Onset    Hypertension Mother     Hypertension Father    Reviewed and negative for neurological problems    SOCIAL HISTORY  Social History     Tobacco Use    Smoking status: Never    Smokeless tobacco: Never   Vaping Use    Vaping status: Never Used   Substance Use Topics    Alcohol use: Yes     Comment: rare    Drug use: No       SYSTEMS REVIEW  Twelve-system ROS was done and other than the HPI this was negative/positive for back pain, arm and leg pain, joint pain, numbness/tingling, weakness paralysis, difficulty walking, balance/coordination problems, movement disorder, ringing in the ears, hearing loss, vision symptoms, sleepiness during the day, sleeping problems, bowel problems.  No new concerns/issues.    MEDICATIONS  Current Outpatient Medications   Medication Sig Dispense Refill    erythromycin (ROMYCIN) 5 MG/GM ophthalmic ointment Place  "0.5 inches into both eyes at bedtime 3.5 g 0    fluticasone (FLONASE) 50 MCG/ACT nasal spray Spray 2 sprays into both nostrils daily 16 g 1    losartan (COZAAR) 25 MG tablet Take 0.5 tablets (12.5 mg) by mouth daily 45 tablet 4    pramipexole (MIRAPEX) 0.25 MG tablet Take 1 tablet (0.25 mg) by mouth 3 times daily 90 tablet 4    sildenafil (REVATIO) 20 MG tablet 1-2 tablets 1 hour prior to sexual intercourse. 10 tablet 11    simvastatin (ZOCOR) 40 MG tablet Take 1 tablet (40 mg) by mouth at bedtime 90 tablet 4    copper gluconate 2 MG tablet Take 1 tablet (2 mg) by mouth daily (Patient not taking: Reported on 4/18/2024) 30 tablet 0     No current facility-administered medications for this visit.        PHYSICAL EXAMINATION  VITALS: /82   Pulse 52   Ht 1.676 m (5' 6\")   Wt 77.1 kg (170 lb)   BMI 27.44 kg/m    GENERAL: Healthy appearing, alert, no acute distress, normal habitus.  CARDIOVASCULAR: Extremities warm and well perfused. Pulses present.   NEUROLOGICAL:  Patient is awake and oriented to self, place and time.  Attention span is normal.  Memory is grossly intact.  Language is fluent and follows commands appropriately.  Appropriate fund of knowledge. Cranial nerves 2-12 are intact. There is no pronator drift.  Motor exam shows 5/5 strength in all extremities.  Tone is symmetric in the lower extremities.  Mild resting right upper and lower extremity tremor present.  Cogwheeling present on the right and left upper extremity.  Reflexes are brisker on the right side and 2+ brisk in upper extremities and lower extremities. Sensory exam is grossly intact to light touch, pin prick and vibration.  Finger to nose and heel to shin is without dysmetria.  Romberg is negative.  Gait is normal and the patient is able to do tandem walk and walk on toes and heels with some difficulty.  Slight decreased arm swing.  No change in exam today.    DIAGNOSTICS  MRI Brain 2022  CONCLUSION:   1. No acute infarcts, mass lesions " or hemorrhage.   2.  Moderate burden of patchy and punctate deep white matter abnormality in both cerebral hemispheres, likely reflecting chronic small vessel ischemic disease, slightly increased overall since the 03/26/2021 comparison study.     CTA H&N  IMPRESSION:  1. Negative CT angiography of the head and neck.  2. Bilateral groundglass infiltrates in the upper lung zones. This can  be seen in COVID-19 patients, although other pneumonias can also give  this process.    RELEVANT LABS  TSH 2.33  B12 766  MILA positive  ESR negative  CRP negative  Serum protein electrophoresis negative  A1c 5.4  Methylmalonic acid 131  Ferritin 256    EMG      OUTSIDE RECORDS  Outside referral notes and chart notes were reviewed and pertinent information has been summarized (in addition to the HPI):-      MRI C spine-images reviewed.  No major spinal stenosis.  IMPRESSION:  1. Multilevel degenerative changes of the cervical spine, as  described.  2. No spinal canal stenosis.  3. Mild/moderate left neural foraminal stenosis at C4-C5. Mild neural  foraminal narrowing elsewhere.  4. No spinal cord signal abnormality identified. No abnormal  enhancement seen.       MRI brain-images reviewed.  No major structural lesions noted  Significant T2 hyperintensities  IMPRESSION:  1. No definite acute intracranial process. Specifically, no findings  to suggest acute/early subacute infarct, intracranial hemorrhage,  extra axial fluid collection, or mass effect.  2. Brain atrophy and presumed chronic small vessel ischemic changes,  as described.  3. Diminished appearance of the T2 flow-voids within the lateral left  transverse and sigmoid dural venous sinuses, nonspecific. This may be  due to incidental slow flow or possibly stenosis. The possibility of  age-indeterminate dural venous sinus thrombosis/occlusion is difficult  to entirely excluded. If clinically warranted, a CT or MR venogram  with contrast could be considered for further  evaluation.     Component      Latest Ref Rng 1/22/2024  12:43 PM   Protein Total      6.4 - 8.3 g/dL 7.7    Albumin      3.5 - 5.2 g/dL 4.3    Bilirubin Total      <=1.2 mg/dL 0.5    Alkaline Phosphatase      40 - 150 U/L 85    AST      0 - 45 U/L 19    ALT      0 - 70 U/L 22    Bilirubin Direct      0.00 - 0.30 mg/dL <0.20    Neutrophil Cytoplasmic Antibody      <1:10  <1:10    Neutrophil Cytoplasmic Antibody Pattern The ANCA IFA is <1:10. No further testing will be performed.    Vitamin B12      232 - 1,245 pg/mL 876    TSH      0.30 - 4.20 uIU/mL 2.88    Hemoglobin A1C      <5.7 % 5.3    Ceruloplasmin      20 - 60 mg/dL 17 (L)    Copper      70.0 - 140.0 ug/dL 98.5    Angiotensin Converting Enzyme      16 - 85 U/L 30    Lyme Disease Antibodies Serum      <0.90  0.05       Legend:  (L) Low    CTA  IMPRESSION:   CTA Head: No evidence of large vessel occlusion or high-grade  stenosis.   CTA Neck: No evidence of large vessel occlusion or high-grade  stenosis.     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  Probable incidental arachnoid granulation within the hypoplastic left  transverse sinus.     IMPRESSION/REPORT/PLAN  Right leg weakness  Resting tremor  Parkinsonism, unspecified Parkinsonism type  Hyperreflexia  T2 hyperintensities on MRI  Low ceruloplasmin  Medication side effects    This is a 73 year old male with progressive right-sided weakness with a right-sided resting tremor and cogwheeling in both upper extremities.  Exam further shows significant hyperreflexia more on the right side than the left.  Symptoms are not completely consistent with Parkinson's disease though could be a Parkinson's plus syndrome like cortical basal degeneration.  Could be secondary parkinsonism as well.    MRI brain overall noncontributory though does show some T2 hyperintensities.  Some of them are in a watershed pattern we  will check a CT angiogram.  CTA/CTV were noncontributory.MRI C-spine did not show any spinal stenosis to explain the symptoms.  Blood work has been noncontributory except for low ceruloplasmin.  Has not been able to try the copper supplement so far.  Will recommend multivitamin with copper.    Since the pramipexole is helping he might have Parkinson's disease though he wants to get a DaTscan.  This is pending.  Sinemet was tried in between and that caused somnolence.     He can continue the Mirapex for restless leg syndrome and possible Parkinson's disease in the meantime.  Need to stop this for the DaTscan.    Return depending on the results of the DaTscan.  Might need a referral to a tertiary center if the DaTscan comes back negative.  Recommend exercise and healthy lifestyle.  Could try non-dopamine medications depending on the results of the DaTscan.    -     Multivitamin with copper  -     NM Brain Imaging Tomographic (Spect) Datscan    Return if symptoms worsen or fail to improve, for In-Clinic Visit (must).    Over 30 minutes were spent coordinating the care for the patient on the day of the encounter.  This includes previsit, during visit and post visit activities as documented above.  Counseling patient with prescription management.  Medication side effects.  Multiple test reviewed.  (Activities include but not inclusive of reviewing chart, reviewing outside records, reviewing labs and imaging study results as well as the images, patient visit time including getting history and exam,  use if applicable, review of test results with the patient and coming up with a plan in a shared model, counseling patient and family, education and answering patient questions, EMR , EMR diagnosis entry and problem list management, medication reconciliation and prescription management if applicable, paperwork if applicable, printing documents and documentation of the visit activities.)        Harsh  MD Camilo  Neurologist  [facility]  Tel:- [phone]    This note was dictated using voice recognition software.  Any grammatical or context distortions are unintentional and inherent to the software.

## 2024-05-14 NOTE — NURSING NOTE
Chief Complaint   Patient presents with    Extremity Weakness     Patient states that his symptoms are the same as last visit.      Kia Yeager on 5/14/2024 at 2:08 PM

## 2024-05-21 ENCOUNTER — ANCILLARY PROCEDURE (OUTPATIENT)
Dept: NUCLEAR MEDICINE | Facility: CLINIC | Age: 74
End: 2024-05-21
Attending: PSYCHIATRY & NEUROLOGY
Payer: COMMERCIAL

## 2024-05-21 DIAGNOSIS — G20.B2 PARKINSON'S DISEASE WITH DYSKINESIA AND FLUCTUATING MANIFESTATIONS (H): Primary | ICD-10-CM

## 2024-05-21 PROCEDURE — 78803 RP LOCLZJ TUM SPECT 1 AREA: CPT | Performed by: RADIOLOGY

## 2024-05-21 PROCEDURE — A9584 IODINE I-123 IOFLUPANE: HCPCS | Performed by: PSYCHIATRY & NEUROLOGY

## 2024-05-21 RX ORDER — TC 99M MEDRONATE 20 MG/10ML
20-30 INJECTION, POWDER, LYOPHILIZED, FOR SOLUTION INTRAVENOUS ONCE
Status: DISCONTINUED | OUTPATIENT
Start: 2024-05-21 | End: 2024-05-21

## 2024-05-21 RX ORDER — IOFLUPANE I-123 2 MCI/ML
4-6 INJECTION, SOLUTION INTRAVENOUS ONCE
Status: COMPLETED | OUTPATIENT
Start: 2024-05-21 | End: 2024-05-21

## 2024-05-21 RX ADMIN — Medication 130 MG: at 09:55

## 2024-05-21 RX ADMIN — IOFLUPANE I-123 5 MILLICURIE: 2 INJECTION, SOLUTION INTRAVENOUS at 11:04

## 2024-05-24 ENCOUNTER — TELEPHONE (OUTPATIENT)
Dept: NEUROLOGY | Facility: CLINIC | Age: 74
End: 2024-05-24
Payer: COMMERCIAL

## 2024-05-24 NOTE — TELEPHONE ENCOUNTER
[facility]    Phone Message    May a detailed message be left on voicemail: yes     Reason for Call: Requesting Results     Name/type of test: MRI   Date of test: 05/21/2024  Was test done at a location other than [facility] (Please fill in the location if not [facility])?: No    Please contact Pt and advise next steps with Dr Page and follow up appointment.  Pt can be reached at: [phone]    Action Taken: Other: Neurology     Travel Screening: Not Applicable

## 2024-05-24 NOTE — TELEPHONE ENCOUNTER
No result note on imaging from 5/21/24.     Will route to provider to review.    Kathryn DAVIS RN, BSN  [facility]

## 2024-05-28 NOTE — TELEPHONE ENCOUNTER
Testing was positive for Parkinson's disease.  Can he make an appointment to discuss further treatment options.

## 2024-05-31 ENCOUNTER — OFFICE VISIT (OUTPATIENT)
Dept: NEUROLOGY | Facility: CLINIC | Age: 74
End: 2024-05-31
Payer: COMMERCIAL

## 2024-05-31 VITALS
RESPIRATION RATE: 16 BRPM | DIASTOLIC BLOOD PRESSURE: 82 MMHG | HEART RATE: 60 BPM | SYSTOLIC BLOOD PRESSURE: 128 MMHG | BODY MASS INDEX: 27.44 KG/M2 | WEIGHT: 170 LBS

## 2024-05-31 DIAGNOSIS — G25.2 RESTING TREMOR: ICD-10-CM

## 2024-05-31 DIAGNOSIS — R90.89 T2 HYPERINTENSE FOCI PRESENT IN CEREBRAL CORTEX ON MAGNETIC RESONANCE IMAGING: ICD-10-CM

## 2024-05-31 DIAGNOSIS — G20.C PARKINSONISM, UNSPECIFIED PARKINSONISM TYPE (H): Primary | ICD-10-CM

## 2024-05-31 PROCEDURE — G2211 COMPLEX E/M VISIT ADD ON: HCPCS | Performed by: PSYCHIATRY & NEUROLOGY

## 2024-05-31 PROCEDURE — 99214 OFFICE O/P EST MOD 30 MIN: CPT | Performed by: PSYCHIATRY & NEUROLOGY

## 2024-05-31 RX ORDER — CARBIDOPA AND LEVODOPA 25; 100 MG/1; MG/1
TABLET ORAL
Qty: 810 TABLET | Refills: 1 | Status: SHIPPED | OUTPATIENT
Start: 2024-05-31 | End: 2024-08-23

## 2024-05-31 NOTE — LETTER
5/31/2024         RE: Jaime Saucedo  [address] [facility] [id number]        Dear Colleague,    Thank you for referring your patient, Jaime Saucedo, to the [facility] [place]. Please see a copy of my visit note below.    NEUROLOGY OUTPATIENT PROGRESS NOTE   May 31, 2024     CHIEF COMPLAINT/REASON FOR VISIT/REASON FOR CONSULT  Patient presents with:  Follow Up    REASON FOR CONSULTATION- Leg weakness    REFERRAL SOURCE  Dr. Gayla Griffith  CC Dr. Gayla Griffith    HISTORY OF PRESENT ILLNESS  Jaime Saucedo is a 74 year old male seen today for evaluation of leg weakness.  He reports that the symptoms came on in April 2022.  In April 2021 he did have an episode of COVID infection.  Did have some long-haul symptoms.  Right leg is more prominently involved than the left leg.  Symptoms have been progressive since then.  Does complain of some minor back pain.  No symptoms in his hands.  Does not drop things with his hands though there might be some difficulty with the right hand with squeezing things.  Does complain of some loss of balance at times.  Does have some poor vision which might be leading to loss of balance.  Does report some stiffness in his legs along with some numbness in the feet.  He did have a EMG done through [facility] which was negative for neuropathy.  He was started on Mirapex since he does not have a resting tremor on the right side for possible Parkinson's disease.  This has been helping occasionally it does help significantly at nighttime for restless leg syndrome.  Denies any major neck pain.    2/27/24  Patient returns today.  Feels that the symptoms are about the same.  No progression or any new symptoms.  Complains of balance issues.  Feels that that he has a plugged ear.  Wants to follow-up with the ENT provider.  I did put in a referral for him.  Pramipexole is helping at nighttime with the symptoms.  Discussed mechanism of  action of pramipexole.  Does not want to try medications for right now and wants to get a DaTscan.  No new concerns.    5/14/24  Patient returns today  1.  He reports his symptoms are about the same as before.  He did try the Sinemet which puts him to sleep.  Has not noticed any benefit.  The pramipexole does help somewhat with the symptoms though he does feel tired and sleepy.  Is only taking it during the daytime.  DaTscan is scheduled for next week  2.  He did complete the CT angiogram of/CTV.  No concerning findings  3.  Reports no other new symptoms.    5/31/24  Patient is today  1.  Continues to have the symptoms similar to before.  His DaTscan does show Parkinson's disease.  Discussed pathophysiology of the DaTscan as well as Parkinson's disease.  He is interested in trying the Sinemet 1 more time.  Pramipexole has been more helpful though we discussed risks with higher doses of pramipexole and wants to start with Sinemet first.  2.  Does have back pain and dizziness issues.  Discussed that this is probably unrelated to the Parkinson's and could look into it in the future time permitting.  No other new concerns.    Previous history is reviewed and this is unchanged.    PAST MEDICAL/SURGICAL HISTORY  Past Medical History:   Diagnosis Date     Hypertension      Obstructive sleep apnea syndrome 6/1/2011     Pneumonia due to 2019 novel coronavirus 3/26/2021     Patient Active Problem List   Diagnosis     Hypertension goal BP (blood pressure) < 140/90     Diplopia     Vertigo     Hyperlipidemia LDL goal <130     Restless legs syndrome (RLS)     Obstructive sleep apnea syndrome     Parkinson's disease with dyskinesia and fluctuating manifestations (H)   Significant for sleep disorder, vision symptoms, arthritis, tremors from Parkinson's disease, high blood pressure, high cholesterol    FAMILY HISTORY  Family History   Problem Relation Age of Onset     Hypertension Mother      Hypertension Father    Reviewed and  negative for neurological problems    SOCIAL HISTORY  Social History     Tobacco Use     Smoking status: Never     Smokeless tobacco: Never   Vaping Use     Vaping status: Never Used   Substance Use Topics     Alcohol use: Yes     Comment: rare     Drug use: No       SYSTEMS REVIEW  Twelve-system ROS was done and other than the HPI this was negative/positive for back pain, arm and leg pain, joint pain, numbness/tingling, weakness paralysis, difficulty walking, balance/coordination problems, movement disorder, ringing in the ears, hearing loss, vision symptoms, sleepiness during the day, sleeping problems, bowel problems.  No new symptoms/issues.    MEDICATIONS  Current Outpatient Medications   Medication Sig Dispense Refill     carbidopa-levodopa (SINEMET)  MG tablet Take at least 30 min before meals or 2 hours after meals. Do not mix with food. Take 1 pill TID for 2 weeks then 2 pills TID for 2 weeks and then 3 pills TID if needed for symptom control. 810 tablet 1     copper gluconate 2 MG tablet Take 1 tablet (2 mg) by mouth daily (Patient not taking: Reported on 4/18/2024) 30 tablet 0     erythromycin (ROMYCIN) 5 MG/GM ophthalmic ointment Place 0.5 inches into both eyes at bedtime 3.5 g 0     fluticasone (FLONASE) 50 MCG/ACT nasal spray Spray 2 sprays into both nostrils daily 16 g 1     losartan (COZAAR) 25 MG tablet Take 0.5 tablets (12.5 mg) by mouth daily 45 tablet 4     pramipexole (MIRAPEX) 0.25 MG tablet Take 1 tablet (0.25 mg) by mouth 3 times daily 90 tablet 4     sildenafil (REVATIO) 20 MG tablet 1-2 tablets 1 hour prior to sexual intercourse. 10 tablet 11     simvastatin (ZOCOR) 40 MG tablet Take 1 tablet (40 mg) by mouth at bedtime 90 tablet 4     No current facility-administered medications for this visit.        PHYSICAL EXAMINATION  VITALS: /82   Pulse 60   Resp 16   Wt 77.1 kg (170 lb)   BMI 27.44 kg/m    GENERAL: Healthy appearing, alert, no acute distress, normal  habitus.  CARDIOVASCULAR: Extremities warm and well perfused. Pulses present.   NEUROLOGICAL:  Patient is awake and oriented to self, place and time.  Attention span is normal.  Memory is grossly intact.  Language is fluent and follows commands appropriately.  Appropriate fund of knowledge. Cranial nerves 2-12 are intact. There is no pronator drift.  Motor exam shows 5/5 strength in all extremities.  Tone is symmetric in the lower extremities.  Mild resting right upper and lower extremity tremor present.  Cogwheeling present on the right and left upper extremity.  Reflexes are brisker on the right side and 2+ brisk in upper extremities and lower extremities. Sensory exam is grossly intact to light touch, pin prick and vibration.  Finger to nose and heel to shin is without dysmetria.  Romberg is negative.  Gait is normal and the patient is able to do tandem walk and walk on toes and heels with some difficulty.  Slight decreased arm swing.  Exam stable.    DIAGNOSTICS  MRI Brain 2022  CONCLUSION:   1. No acute infarcts, mass lesions or hemorrhage.   2.  Moderate burden of patchy and punctate deep white matter abnormality in both cerebral hemispheres, likely reflecting chronic small vessel ischemic disease, slightly increased overall since the 03/26/2021 comparison study.     CTA H&N  IMPRESSION:  1. Negative CT angiography of the head and neck.  2. Bilateral groundglass infiltrates in the upper lung zones. This can  be seen in COVID-19 patients, although other pneumonias can also give  this process.    RELEVANT LABS  TSH 2.33  B12 766  MILA positive  ESR negative  CRP negative  Serum protein electrophoresis negative  A1c 5.4  Methylmalonic acid 131  Ferritin 256    EMG      OUTSIDE RECORDS  Outside referral notes and chart notes were reviewed and pertinent information has been summarized (in addition to the HPI):-      MRI C spine-images reviewed.  No major spinal stenosis.  IMPRESSION:  1. Multilevel degenerative  changes of the cervical spine, as  described.  2. No spinal canal stenosis.  3. Mild/moderate left neural foraminal stenosis at C4-C5. Mild neural  foraminal narrowing elsewhere.  4. No spinal cord signal abnormality identified. No abnormal  enhancement seen.       MRI brain-images reviewed.  No major structural lesions noted  Significant T2 hyperintensities  IMPRESSION:  1. No definite acute intracranial process. Specifically, no findings  to suggest acute/early subacute infarct, intracranial hemorrhage,  extra axial fluid collection, or mass effect.  2. Brain atrophy and presumed chronic small vessel ischemic changes,  as described.  3. Diminished appearance of the T2 flow-voids within the lateral left  transverse and sigmoid dural venous sinuses, nonspecific. This may be  due to incidental slow flow or possibly stenosis. The possibility of  age-indeterminate dural venous sinus thrombosis/occlusion is difficult  to entirely excluded. If clinically warranted, a CT or MR venogram  with contrast could be considered for further evaluation.     Component      Latest Ref Rn 1/22/2024  12:43 PM   Protein Total      6.4 - 8.3 g/dL 7.7    Albumin      3.5 - 5.2 g/dL 4.3    Bilirubin Total      <=1.2 mg/dL 0.5    Alkaline Phosphatase      40 - 150 U/L 85    AST      0 - 45 U/L 19    ALT      0 - 70 U/L 22    Bilirubin Direct      0.00 - 0.30 mg/dL <0.20    Neutrophil Cytoplasmic Antibody      <1:10  <1:10    Neutrophil Cytoplasmic Antibody Pattern The ANCA IFA is <1:10. No further testing will be performed.    Vitamin B12      232 - 1,245 pg/mL 876    TSH      0.30 - 4.20 uIU/mL 2.88    Hemoglobin A1C      <5.7 % 5.3    Ceruloplasmin      20 - 60 mg/dL 17 (L)    Copper      70.0 - 140.0 ug/dL 98.5    Angiotensin Converting Enzyme      16 - 85 U/L 30    Lyme Disease Antibodies Serum      <0.90  0.05       Legend:  (L) Low    CTA  IMPRESSION:   CTA Head: No evidence of large vessel occlusion or high-grade  stenosis.   CTA  Neck: No evidence of large vessel occlusion or high-grade  stenosis.     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  Probable incidental arachnoid granulation within the hypoplastic left  transverse sinus.     MICHELLE scan  Impression:  Presynaptic dopaminergic deficit is present.      IMPRESSION/REPORT/PLAN  Right leg weakness  Resting tremor  Parkinson's disease  Hyperreflexia  T2 hyperintensities on MRI  Low ceruloplasmin  Medication side effects    This is a 74 year old male with progressive right-sided weakness with a right-sided resting tremor and cogwheeling in both upper extremities.  Exam further shows significant hyperreflexia more on the right side than the left.  Symptoms are not completely consistent with Parkinson's disease though could be a Parkinson's plus syndrome like cortical basal degeneration.  Could be secondary parkinsonism as well.    MRI brain overall noncontributory though does show some T2 hyperintensities.  Some of them are in a watershed pattern we will check a CT angiogram.  CTA/CTV were noncontributory.MRI C-spine did not show any spinal stenosis to explain the symptoms.  Blood work has been noncontributory except for low ceruloplasmin.  Has not been able to try the copper supplement so far.  Will recommend multivitamin with copper.    DaTscan was then done which was positive for Parkinson's disease.  Sinemet previously has caused somnolence though he is interested in retrying that.  He is also on Mirapex for restless leg syndrome which is actually helping with his symptoms.  Will try the Sinemet due to less side effects but if he is unable to tolerate it we might need to go up on his Mirapex.  Discussed pathophysiology/prognosis.  Discussed surgical options for Parkinson's.  Could consider nondopamine medications for Parkinson's    He does complain of back pain as well as some dizziness.   Could address these further once his Parkinson's disease/symptoms have improved.    And see him back in 2 to 3 months.  Recommend exercise and healthy lifestyle.    -     Multivitamin with copper  -     carbidopa-levodopa (SINEMET)  MG tablet; Take at least 30 min before meals or 2 hours after meals. Do not mix with food. Take 1 pill TID for 2 weeks then 2 pills TID for 2 weeks and then 3 pills TID if needed for symptom control.  - Continue pramipexole      Return in about 2 months (around 7/31/2024) for In-Clinic Visit (must).    Over 33 minutes were spent coordinating the care for the patient on the day of the encounter.  This includes previsit, during visit and post visit activities as documented above.  Counseling patient.  Prescription management.  New diagnosis of Parkinson's disease.  (Activities include but not inclusive of reviewing chart, reviewing outside records, reviewing labs and imaging study results as well as the images, patient visit time including getting history and exam,  use if applicable, review of test results with the patient and coming up with a plan in a shared model, counseling patient and family, education and answering patient questions, EMR , EMR diagnosis entry and problem list management, medication reconciliation and prescription management if applicable, paperwork if applicable, printing documents and documentation of the visit activities.)        Nigel Page MD  Neurologist  [facility]  Tel:- [phone]    This note was dictated using voice recognition software.  Any grammatical or context distortions are unintentional and inherent to the software.        Again, thank you for allowing me to participate in the care of your patient.        Sincerely,        Nigel Page MD

## 2024-05-31 NOTE — PROGRESS NOTES
NEUROLOGY OUTPATIENT PROGRESS NOTE   May 31, 2024     CHIEF COMPLAINT/REASON FOR VISIT/REASON FOR CONSULT  Patient presents with:  Follow Up    REASON FOR CONSULTATION- Leg weakness    REFERRAL SOURCE  Dr. Gayla Griffith  CC Dr. Gayla Griffith    HISTORY OF PRESENT ILLNESS  Jaime Saucedo is a 74 year old male seen today for evaluation of leg weakness.  He reports that the symptoms came on in April 2022.  In April 2021 he did have an episode of COVID infection.  Did have some long-haul symptoms.  Right leg is more prominently involved than the left leg.  Symptoms have been progressive since then.  Does complain of some minor back pain.  No symptoms in his hands.  Does not drop things with his hands though there might be some difficulty with the right hand with squeezing things.  Does complain of some loss of balance at times.  Does have some poor vision which might be leading to loss of balance.  Does report some stiffness in his legs along with some numbness in the feet.  He did have a EMG done through [facility] which was negative for neuropathy.  He was started on Mirapex since he does not have a resting tremor on the right side for possible Parkinson's disease.  This has been helping occasionally it does help significantly at nighttime for restless leg syndrome.  Denies any major neck pain.    2/27/24  Patient returns today.  Feels that the symptoms are about the same.  No progression or any new symptoms.  Complains of balance issues.  Feels that that he has a plugged ear.  Wants to follow-up with the ENT provider.  I did put in a referral for him.  Pramipexole is helping at nighttime with the symptoms.  Discussed mechanism of action of pramipexole.  Does not want to try medications for right now and wants to get a DaTscan.  No new concerns.    5/14/24  Patient returns today  1.  He reports his symptoms are about the same as before.  He did try the Sinemet which puts him to sleep.  Has not  noticed any benefit.  The pramipexole does help somewhat with the symptoms though he does feel tired and sleepy.  Is only taking it during the daytime.  DaTscan is scheduled for next week  2.  He did complete the CT angiogram of/CTV.  No concerning findings  3.  Reports no other new symptoms.    5/31/24  Patient is today  1.  Continues to have the symptoms similar to before.  His DaTscan does show Parkinson's disease.  Discussed pathophysiology of the DaTscan as well as Parkinson's disease.  He is interested in trying the Sinemet 1 more time.  Pramipexole has been more helpful though we discussed risks with higher doses of pramipexole and wants to start with Sinemet first.  2.  Does have back pain and dizziness issues.  Discussed that this is probably unrelated to the Parkinson's and could look into it in the future time permitting.  No other new concerns.    Previous history is reviewed and this is unchanged.    PAST MEDICAL/SURGICAL HISTORY  Past Medical History:   Diagnosis Date    Hypertension     Obstructive sleep apnea syndrome 6/1/2011    Pneumonia due to 2019 novel coronavirus 3/26/2021     Patient Active Problem List   Diagnosis    Hypertension goal BP (blood pressure) < 140/90    Diplopia    Vertigo    Hyperlipidemia LDL goal <130    Restless legs syndrome (RLS)    Obstructive sleep apnea syndrome    Parkinson's disease with dyskinesia and fluctuating manifestations (H)   Significant for sleep disorder, vision symptoms, arthritis, tremors from Parkinson's disease, high blood pressure, high cholesterol    FAMILY HISTORY  Family History   Problem Relation Age of Onset    Hypertension Mother     Hypertension Father    Reviewed and negative for neurological problems    SOCIAL HISTORY  Social History     Tobacco Use    Smoking status: Never    Smokeless tobacco: Never   Vaping Use    Vaping status: Never Used   Substance Use Topics    Alcohol use: Yes     Comment: rare    Drug use: No       SYSTEMS  REVIEW  Twelve-system ROS was done and other than the HPI this was negative/positive for back pain, arm and leg pain, joint pain, numbness/tingling, weakness paralysis, difficulty walking, balance/coordination problems, movement disorder, ringing in the ears, hearing loss, vision symptoms, sleepiness during the day, sleeping problems, bowel problems.  No new symptoms/issues.    MEDICATIONS  Current Outpatient Medications   Medication Sig Dispense Refill    carbidopa-levodopa (SINEMET)  MG tablet Take at least 30 min before meals or 2 hours after meals. Do not mix with food. Take 1 pill TID for 2 weeks then 2 pills TID for 2 weeks and then 3 pills TID if needed for symptom control. 810 tablet 1    copper gluconate 2 MG tablet Take 1 tablet (2 mg) by mouth daily (Patient not taking: Reported on 4/18/2024) 30 tablet 0    erythromycin (ROMYCIN) 5 MG/GM ophthalmic ointment Place 0.5 inches into both eyes at bedtime 3.5 g 0    fluticasone (FLONASE) 50 MCG/ACT nasal spray Spray 2 sprays into both nostrils daily 16 g 1    losartan (COZAAR) 25 MG tablet Take 0.5 tablets (12.5 mg) by mouth daily 45 tablet 4    pramipexole (MIRAPEX) 0.25 MG tablet Take 1 tablet (0.25 mg) by mouth 3 times daily 90 tablet 4    sildenafil (REVATIO) 20 MG tablet 1-2 tablets 1 hour prior to sexual intercourse. 10 tablet 11    simvastatin (ZOCOR) 40 MG tablet Take 1 tablet (40 mg) by mouth at bedtime 90 tablet 4     No current facility-administered medications for this visit.        PHYSICAL EXAMINATION  VITALS: /82   Pulse 60   Resp 16   Wt 77.1 kg (170 lb)   BMI 27.44 kg/m    GENERAL: Healthy appearing, alert, no acute distress, normal habitus.  CARDIOVASCULAR: Extremities warm and well perfused. Pulses present.   NEUROLOGICAL:  Patient is awake and oriented to self, place and time.  Attention span is normal.  Memory is grossly intact.  Language is fluent and follows commands appropriately.  Appropriate fund of knowledge. Cranial  nerves 2-12 are intact. There is no pronator drift.  Motor exam shows 5/5 strength in all extremities.  Tone is symmetric in the lower extremities.  Mild resting right upper and lower extremity tremor present.  Cogwheeling present on the right and left upper extremity.  Reflexes are brisker on the right side and 2+ brisk in upper extremities and lower extremities. Sensory exam is grossly intact to light touch, pin prick and vibration.  Finger to nose and heel to shin is without dysmetria.  Romberg is negative.  Gait is normal and the patient is able to do tandem walk and walk on toes and heels with some difficulty.  Slight decreased arm swing.  Exam stable.    DIAGNOSTICS  MRI Brain 2022  CONCLUSION:   1. No acute infarcts, mass lesions or hemorrhage.   2.  Moderate burden of patchy and punctate deep white matter abnormality in both cerebral hemispheres, likely reflecting chronic small vessel ischemic disease, slightly increased overall since the 03/26/2021 comparison study.     CTA H&N  IMPRESSION:  1. Negative CT angiography of the head and neck.  2. Bilateral groundglass infiltrates in the upper lung zones. This can  be seen in COVID-19 patients, although other pneumonias can also give  this process.    RELEVANT LABS  TSH 2.33  B12 766  MILA positive  ESR negative  CRP negative  Serum protein electrophoresis negative  A1c 5.4  Methylmalonic acid 131  Ferritin 256    EMG      OUTSIDE RECORDS  Outside referral notes and chart notes were reviewed and pertinent information has been summarized (in addition to the HPI):-      MRI C spine-images reviewed.  No major spinal stenosis.  IMPRESSION:  1. Multilevel degenerative changes of the cervical spine, as  described.  2. No spinal canal stenosis.  3. Mild/moderate left neural foraminal stenosis at C4-C5. Mild neural  foraminal narrowing elsewhere.  4. No spinal cord signal abnormality identified. No abnormal  enhancement seen.       MRI brain-images reviewed.  No major  structural lesions noted  Significant T2 hyperintensities  IMPRESSION:  1. No definite acute intracranial process. Specifically, no findings  to suggest acute/early subacute infarct, intracranial hemorrhage,  extra axial fluid collection, or mass effect.  2. Brain atrophy and presumed chronic small vessel ischemic changes,  as described.  3. Diminished appearance of the T2 flow-voids within the lateral left  transverse and sigmoid dural venous sinuses, nonspecific. This may be  due to incidental slow flow or possibly stenosis. The possibility of  age-indeterminate dural venous sinus thrombosis/occlusion is difficult  to entirely excluded. If clinically warranted, a CT or MR venogram  with contrast could be considered for further evaluation.     Component      Latest Ref Rng 1/22/2024  12:43 PM   Protein Total      6.4 - 8.3 g/dL 7.7    Albumin      3.5 - 5.2 g/dL 4.3    Bilirubin Total      <=1.2 mg/dL 0.5    Alkaline Phosphatase      40 - 150 U/L 85    AST      0 - 45 U/L 19    ALT      0 - 70 U/L 22    Bilirubin Direct      0.00 - 0.30 mg/dL <0.20    Neutrophil Cytoplasmic Antibody      <1:10  <1:10    Neutrophil Cytoplasmic Antibody Pattern The ANCA IFA is <1:10. No further testing will be performed.    Vitamin B12      232 - 1,245 pg/mL 876    TSH      0.30 - 4.20 uIU/mL 2.88    Hemoglobin A1C      <5.7 % 5.3    Ceruloplasmin      20 - 60 mg/dL 17 (L)    Copper      70.0 - 140.0 ug/dL 98.5    Angiotensin Converting Enzyme      16 - 85 U/L 30    Lyme Disease Antibodies Serum      <0.90  0.05       Legend:  (L) Low    CTA  IMPRESSION:   CTA Head: No evidence of large vessel occlusion or high-grade  stenosis.   CTA Neck: No evidence of large vessel occlusion or high-grade  stenosis.     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  Probable incidental arachnoid granulation within the hypoplastic left  transverse  sinus.     MICHELLE scan  Impression:  Presynaptic dopaminergic deficit is present.      IMPRESSION/REPORT/PLAN  Right leg weakness  Resting tremor  Parkinson's disease  Hyperreflexia  T2 hyperintensities on MRI  Low ceruloplasmin  Medication side effects    This is a 74 year old male with progressive right-sided weakness with a right-sided resting tremor and cogwheeling in both upper extremities.  Exam further shows significant hyperreflexia more on the right side than the left.  Symptoms are not completely consistent with Parkinson's disease though could be a Parkinson's plus syndrome like cortical basal degeneration.  Could be secondary parkinsonism as well.    MRI brain overall noncontributory though does show some T2 hyperintensities.  Some of them are in a watershed pattern we will check a CT angiogram.  CTA/CTV were noncontributory.MRI C-spine did not show any spinal stenosis to explain the symptoms.  Blood work has been noncontributory except for low ceruloplasmin.  Has not been able to try the copper supplement so far.  Will recommend multivitamin with copper.    DaTscan was then done which was positive for Parkinson's disease.  Sinemet previously has caused somnolence though he is interested in retrying that.  He is also on Mirapex for restless leg syndrome which is actually helping with his symptoms.  Will try the Sinemet due to less side effects but if he is unable to tolerate it we might need to go up on his Mirapex.  Discussed pathophysiology/prognosis.  Discussed surgical options for Parkinson's.  Could consider nondopamine medications for Parkinson's    He does complain of back pain as well as some dizziness.  Could address these further once his Parkinson's disease/symptoms have improved.    And see him back in 2 to 3 months.  Recommend exercise and healthy lifestyle.    -     Multivitamin with copper  -     carbidopa-levodopa (SINEMET)  MG tablet; Take at least 30 min before meals or 2 hours after  meals. Do not mix with food. Take 1 pill TID for 2 weeks then 2 pills TID for 2 weeks and then 3 pills TID if needed for symptom control.  - Continue pramipexole      Return in about 2 months (around 7/31/2024) for In-Clinic Visit (must).    Over 33 minutes were spent coordinating the care for the patient on the day of the encounter.  This includes previsit, during visit and post visit activities as documented above.  Counseling patient.  Prescription management.  New diagnosis of Parkinson's disease.  (Activities include but not inclusive of reviewing chart, reviewing outside records, reviewing labs and imaging study results as well as the images, patient visit time including getting history and exam,  use if applicable, review of test results with the patient and coming up with a plan in a shared model, counseling patient and family, education and answering patient questions, EMR , EMR diagnosis entry and problem list management, medication reconciliation and prescription management if applicable, paperwork if applicable, printing documents and documentation of the visit activities.)        Nigel Page MD  Neurologist  [facility]  Tel:- [phone]    This note was dictated using voice recognition software.  Any grammatical or context distortions are unintentional and inherent to the software.

## 2024-06-21 NOTE — PROGRESS NOTES
5/1/2023     E-Consult has been denied due to: Complexity of question, needs in-person referral.    Interprofessional consultation requested by:  Evelio Saavedra PA-C      Clinical Question/Purpose: MY CLINICAL QUESTION IS: Patient is a 72-year-old male with recurrent sinusitis this been treated with antibiotic x2, nasal steroids and even a 10-day taper of prednisone without much improvement in the symptoms.  CT scan diffuse sinusitis with severe mucosal thickening and effacement of the paranasal sinus drainage pathways.  He does not have an appointment with ENT until August and is still having significant symptoms.  Do recommend any additional treatments outside of decongestant, prednisone, nasal steroid type medicines?    Patient assessment and information reviewed: chart and imaging    Recommendations: Unfortunately the patient really just needs to be seen. Given our access issues I will reach out to the clinic team and try to prioritize getting him on the schedule before August. You have provided maximal medical therapy.        The recommendations provided in this E-Consult are based on a review of clinical data pertinent to the clinical question presented, without a review of the patient's complete medical record or, the benefit of a comprehensive in-person or virtual patient evaluation. This consultation should not replace the clinical judgement and evaluation of the provider ordering this E-Consult. Any new clinical issues, or changes in patient status since the filing of this E-Consult will need to be taken into account when assessing these recommendations. Please contact me if you have further questions.    My total time spent reviewing clinical information and formulating assessment was 10 minutes.        eMrari Hernandez MD   Addended by: Ike Nation on: 7/28/2020 01:56 PM     Modules accepted: Level of Service Applied

## 2024-06-28 DIAGNOSIS — G25.81 RESTLESS LEGS SYNDROME (RLS): ICD-10-CM

## 2024-07-01 RX ORDER — PRAMIPEXOLE DIHYDROCHLORIDE 0.25 MG/1
0.25 TABLET ORAL 3 TIMES DAILY
Qty: 90 TABLET | Refills: 4 | Status: SHIPPED | OUTPATIENT
Start: 2024-07-01

## 2024-08-12 ENCOUNTER — TRANSFERRED RECORDS (OUTPATIENT)
Dept: HEALTH INFORMATION MANAGEMENT | Facility: CLINIC | Age: 74
End: 2024-08-12
Payer: COMMERCIAL

## 2024-08-15 DIAGNOSIS — H69.93 DYSFUNCTION OF BOTH EUSTACHIAN TUBES: ICD-10-CM

## 2024-08-15 RX ORDER — FLUTICASONE PROPIONATE 50 MCG
2 SPRAY, SUSPENSION (ML) NASAL DAILY
Qty: 16 G | Refills: 1 | Status: SHIPPED | OUTPATIENT
Start: 2024-08-15 | End: 2024-10-02

## 2024-08-15 NOTE — TELEPHONE ENCOUNTER
Prescription approved per [facility] Refill Protocol.    Pending Prescriptions:                       Disp   Refills    fluticasone (FLONASE) 50 MCG/ACT nasal sp*16 g   1            Sig: Spray 2 sprays into both nostrils daily      Requested Prescriptions   Pending Prescriptions Disp Refills    fluticasone (FLONASE) 50 MCG/ACT nasal spray 16 g 1     Sig: Spray 2 sprays into both nostrils daily       Nasal Allergy Protocol Failed - 8/15/2024  8:18 AM        Failed - Medication indicated for associated diagnosis     Medication is associated with one or more of the following diagnoses:   Benign prostatic hyperplasia  Erectile dysfunction  Female sexual arousal disorder  Pulmonary hypertension  Raynaud s  Sexual Dysfunction            Passed - Patient is age 12 or older        Passed - Medication is active on med list        Passed - Recent (12 mo) or future (90 days) visit within the authorizing provider's specialty     The patient must have completed an in-person or virtual visit within the past 12 months or has a future visit scheduled within the next 90 days with the authorizing provider s specialty.  Urgent care and e-visits do not quality as an office visit for this protocol.             Aida Clark RN on 8/15/2024 at 8:18 AM

## 2024-08-23 ENCOUNTER — OFFICE VISIT (OUTPATIENT)
Dept: NEUROLOGY | Facility: CLINIC | Age: 74
End: 2024-08-23
Payer: COMMERCIAL

## 2024-08-23 ENCOUNTER — TELEPHONE (OUTPATIENT)
Dept: NEUROLOGY | Facility: CLINIC | Age: 74
End: 2024-08-23

## 2024-08-23 VITALS
HEART RATE: 57 BPM | SYSTOLIC BLOOD PRESSURE: 116 MMHG | WEIGHT: 170 LBS | DIASTOLIC BLOOD PRESSURE: 80 MMHG | BODY MASS INDEX: 27.44 KG/M2

## 2024-08-23 DIAGNOSIS — G25.2 RESTING TREMOR: ICD-10-CM

## 2024-08-23 DIAGNOSIS — G20.C PARKINSONISM, UNSPECIFIED PARKINSONISM TYPE (H): ICD-10-CM

## 2024-08-23 DIAGNOSIS — G20.C PARKINSONISM, UNSPECIFIED PARKINSONISM TYPE (H): Primary | ICD-10-CM

## 2024-08-23 DIAGNOSIS — M54.50 LOW BACK PAIN, UNSPECIFIED BACK PAIN LATERALITY, UNSPECIFIED CHRONICITY, UNSPECIFIED WHETHER SCIATICA PRESENT: ICD-10-CM

## 2024-08-23 PROCEDURE — G2211 COMPLEX E/M VISIT ADD ON: HCPCS | Performed by: PSYCHIATRY & NEUROLOGY

## 2024-08-23 PROCEDURE — 99214 OFFICE O/P EST MOD 30 MIN: CPT | Performed by: PSYCHIATRY & NEUROLOGY

## 2024-08-23 RX ORDER — CARBIDOPA AND LEVODOPA 25; 100 MG/1; MG/1
TABLET ORAL
Qty: 360 TABLET | Refills: 1 | Status: SHIPPED | OUTPATIENT
Start: 2024-08-23

## 2024-08-23 RX ORDER — CARBIDOPA AND LEVODOPA 25; 100 MG/1; MG/1
TABLET ORAL
Qty: 360 TABLET | Refills: 1 | Status: CANCELLED | OUTPATIENT
Start: 2024-08-23

## 2024-08-23 NOTE — LETTER
8/23/2024      Jaime Saucedo  [address] [facility] [address]      Dear Colleague,    Thank you for referring your patient, Jaime Saucedo, to the [facility] [place]. Please see a copy of my visit note below.    NEUROLOGY OUTPATIENT PROGRESS NOTE   Aug 23, 2024     CHIEF COMPLAINT/REASON FOR VISIT/REASON FOR CONSULT  Patient presents with:  Parkinson: 2 month follow up - Parkinson, tremors.  Doing better.     REASON FOR CONSULTATION- Leg weakness    REFERRAL SOURCE  Dr. Gayla Griffith  CC Dr. Gayla Griffith    HISTORY OF PRESENT ILLNESS  Jaime Saucedo is a 74 year old male seen today for evaluation of leg weakness.  He reports that the symptoms came on in April 2022.  In April 2021 he did have an episode of COVID infection.  Did have some long-haul symptoms.  Right leg is more prominently involved than the left leg.  Symptoms have been progressive since then.  Does complain of some minor back pain.  No symptoms in his hands.  Does not drop things with his hands though there might be some difficulty with the right hand with squeezing things.  Does complain of some loss of balance at times.  Does have some poor vision which might be leading to loss of balance.  Does report some stiffness in his legs along with some numbness in the feet.  He did have a EMG done through [facility] neurology which was negative for neuropathy.  He was started on Mirapex since he does not have a resting tremor on the right side for possible Parkinson's disease.  This has been helping occasionally it does help significantly at nighttime for restless leg syndrome.  Denies any major neck pain.    2/27/24  Patient returns today.  Feels that the symptoms are about the same.  No progression or any new symptoms.  Complains of balance issues.  Feels that that he has a plugged ear.  Wants to follow-up with the ENT provider.  I did put in a referral for him.  Pramipexole is helping at nighttime  with the symptoms.  Discussed mechanism of action of pramipexole.  Does not want to try medications for right now and wants to get a DaTscan.  No new concerns.    5/14/24  Patient returns today  1.  He reports his symptoms are about the same as before.  He did try the Sinemet which puts him to sleep.  Has not noticed any benefit.  The pramipexole does help somewhat with the symptoms though he does feel tired and sleepy.  Is only taking it during the daytime.  DaTscan is scheduled for next week  2.  He did complete the CT angiogram of/CTV.  No concerning findings  3.  Reports no other new symptoms.    5/31/24  Patient is today  1.  Continues to have the symptoms similar to before.  His DaTscan does show Parkinson's disease.  Discussed pathophysiology of the DaTscan as well as Parkinson's disease.  He is interested in trying the Sinemet 1 more time.  Pramipexole has been more helpful though we discussed risks with higher doses of pramipexole and wants to start with Sinemet first.  2.  Does have back pain and dizziness issues.  Discussed that this is probably unrelated to the Parkinson's and could look into it in the future time permitting.  No other new concerns.    8/23/24  Patient returns today  1.  Symptoms have improved with using the carbidopa and pramipexole.  Uses the pramipexole twice a day and the carbidopa levodopa 1 tablet 3 times a day.  No wearing off.  No side effects.  Overall is tolerating it better.  Has increased his exercise which is also helping.  No major issues with parkinsonism at this point  2.  Continues to complain of back pain.  No shooting pain down the legs.  No weakness numbness in the legs.  Is interested in getting an MRI of his the L-spine.  Feels that it is coming from lifting too much.  No new issues.    Previous history is reviewed and this is unchanged.    PAST MEDICAL/SURGICAL HISTORY  Past Medical History:   Diagnosis Date     Hypertension      Obstructive sleep apnea syndrome  6/1/2011     Pneumonia due to 2019 novel coronavirus 3/26/2021     Patient Active Problem List   Diagnosis     Hypertension goal BP (blood pressure) < 140/90     Diplopia     Vertigo     Hyperlipidemia LDL goal <130     Restless legs syndrome (RLS)     Obstructive sleep apnea syndrome     Parkinson's disease with dyskinesia and fluctuating manifestations (H)   Significant for sleep disorder, vision symptoms, arthritis, tremors from Parkinson's disease, high blood pressure, high cholesterol    FAMILY HISTORY  Family History   Problem Relation Age of Onset     Hypertension Mother      Hypertension Father    Reviewed and negative for neurological problems    SOCIAL HISTORY  Social History     Tobacco Use     Smoking status: Never     Smokeless tobacco: Never   Vaping Use     Vaping status: Never Used   Substance Use Topics     Alcohol use: Yes     Comment: rare     Drug use: No       SYSTEMS REVIEW  Twelve-system ROS was done and other than the HPI this was negative/positive for back pain, arm and leg pain, joint pain, numbness/tingling, weakness paralysis, difficulty walking, balance/coordination problems, movement disorder, ringing in the ears, hearing loss, vision symptoms, sleepiness during the day, sleeping problems, bowel problems.  No new symptoms/issues.    MEDICATIONS  Current Outpatient Medications   Medication Sig Dispense Refill     carbidopa-levodopa (SINEMET)  MG tablet Take at least 30 min before meals or 2 hours after meals. Do not mix with food. Take 1 pill TID. Extra pills at bedtime as needed. 360 tablet 1     erythromycin (ROMYCIN) 5 MG/GM ophthalmic ointment Place 0.5 inches into both eyes at bedtime 3.5 g 0     fluticasone (FLONASE) 50 MCG/ACT nasal spray Spray 2 sprays into both nostrils daily 16 g 1     losartan (COZAAR) 25 MG tablet Take 0.5 tablets (12.5 mg) by mouth daily 45 tablet 4     pramipexole (MIRAPEX) 0.25 MG tablet TAKE ONE TABLET BY MOUTH THREE TIMES A DAY 90 tablet 4      sildenafil (REVATIO) 20 MG tablet 1-2 tablets 1 hour prior to sexual intercourse. 10 tablet 11     simvastatin (ZOCOR) 40 MG tablet Take 1 tablet (40 mg) by mouth at bedtime 90 tablet 4     No current facility-administered medications for this visit.        PHYSICAL EXAMINATION  VITALS: /80   Pulse 57   Wt 77.1 kg (170 lb)   BMI 27.44 kg/m    GENERAL: Healthy appearing, alert, no acute distress, normal habitus.  CARDIOVASCULAR: Extremities warm and well perfused. Pulses present.   NEUROLOGICAL:  Patient is awake and oriented to self, place and time.  Attention span is normal.  Memory is grossly intact.  Language is fluent and follows commands appropriately.  Appropriate fund of knowledge. Cranial nerves 2-12 are intact. There is no pronator drift.  Motor exam shows 5/5 strength in all extremities.  Tone is symmetric in the lower extremities.  Mild resting right upper and lower extremity tremor present.  Cogwheeling present on the right and left upper extremity.  Reflexes are brisker on the right side and 2+ brisk in upper extremities and lower extremities. Sensory exam is grossly intact to light touch, pin prick and vibration.  Finger to nose and heel to shin is without dysmetria.  Romberg is negative.  Gait is normal and the patient is able to do tandem walk and walk on toes and heels with some difficulty.  Slight decreased arm swing.  Exam stable.  No major evidence of parkinsonism.    DIAGNOSTICS  MRI Brain 2022  CONCLUSION:   1. No acute infarcts, mass lesions or hemorrhage.   2.  Moderate burden of patchy and punctate deep white matter abnormality in both cerebral hemispheres, likely reflecting chronic small vessel ischemic disease, slightly increased overall since the 03/26/2021 comparison study.     CTA H&N  IMPRESSION:  1. Negative CT angiography of the head and neck.  2. Bilateral groundglass infiltrates in the upper lung zones. This can  be seen in COVID-19 patients, although other pneumonias can  also give  this process.    RELEVANT LABS  TSH 2.33  B12 766  MILA positive  ESR negative  CRP negative  Serum protein electrophoresis negative  A1c 5.4  Methylmalonic acid 131  Ferritin 256    EMG      OUTSIDE RECORDS  Outside referral notes and chart notes were reviewed and pertinent information has been summarized (in addition to the HPI):-      MRI C spine-images reviewed.  No major spinal stenosis.  IMPRESSION:  1. Multilevel degenerative changes of the cervical spine, as  described.  2. No spinal canal stenosis.  3. Mild/moderate left neural foraminal stenosis at C4-C5. Mild neural  foraminal narrowing elsewhere.  4. No spinal cord signal abnormality identified. No abnormal  enhancement seen.       MRI brain-images reviewed.  No major structural lesions noted  Significant T2 hyperintensities  IMPRESSION:  1. No definite acute intracranial process. Specifically, no findings  to suggest acute/early subacute infarct, intracranial hemorrhage,  extra axial fluid collection, or mass effect.  2. Brain atrophy and presumed chronic small vessel ischemic changes,  as described.  3. Diminished appearance of the T2 flow-voids within the lateral left  transverse and sigmoid dural venous sinuses, nonspecific. This may be  due to incidental slow flow or possibly stenosis. The possibility of  age-indeterminate dural venous sinus thrombosis/occlusion is difficult  to entirely excluded. If clinically warranted, a CT or MR venogram  with contrast could be considered for further evaluation.     Component      Latest Ref [facility] 1/22/2024  12:43 PM   Protein Total      6.4 - 8.3 g/dL 7.7    Albumin      3.5 - 5.2 g/dL 4.3    Bilirubin Total      <=1.2 mg/dL 0.5    Alkaline Phosphatase      40 - 150 U/L 85    AST      0 - 45 U/L 19    ALT      0 - 70 U/L 22    Bilirubin Direct      0.00 - 0.30 mg/dL <0.20    Neutrophil Cytoplasmic Antibody      <1:10  <1:10    Neutrophil Cytoplasmic Antibody Pattern The ANCA IFA is <1:10. No further  testing will be performed.    Vitamin B12      232 - 1,245 pg/mL 876    TSH      0.30 - 4.20 uIU/mL 2.88    Hemoglobin A1C      <5.7 % 5.3    Ceruloplasmin      20 - 60 mg/dL 17 (L)    Copper      70.0 - 140.0 ug/dL 98.5    Angiotensin Converting Enzyme      16 - 85 U/L 30    Lyme Disease Antibodies Serum      <0.90  0.05       Legend:  (L) Low    CTA  IMPRESSION:   CTA Head: No evidence of large vessel occlusion or high-grade  stenosis.   CTA Neck: No evidence of large vessel occlusion or high-grade  stenosis.     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  Probable incidental arachnoid granulation within the hypoplastic left  transverse sinus.     MICHELLE scan  Impression:  Presynaptic dopaminergic deficit is present.      IMPRESSION/REPORT/PLAN  Right leg weakness  Resting tremor  Parkinson's disease  Hyperreflexia  T2 hyperintensities on MRI  Low ceruloplasmin  Medication side effects    This is a 74 year old male with progressive right-sided weakness with a right-sided resting tremor and cogwheeling in both upper extremities.  Exam further shows significant hyperreflexia more on the right side than the left.  Symptoms are not completely consistent with Parkinson's disease though could be a Parkinson's plus syndrome like cortical basal degeneration.  Could be secondary parkinsonism as well.    MRI brain overall noncontributory though does show some T2 hyperintensities.  Some of them are in a watershed pattern we will check a CT angiogram.  CTA/CTV were noncontributory.MRI C-spine did not show any spinal stenosis to explain the symptoms.  Blood work has been noncontributory except for low ceruloplasmin.  Has not been able to try the copper supplement so far.  Will recommend multivitamin with copper.  He is not doing this.    DaTscan was then done which was positive for Parkinson's disease.  Sinemet previously has  caused somnolence though he is interested in retrying that.  He is also on Mirapex for restless leg syndrome which is actually helping with his symptoms.  Currently Sinemet and Mirapex combination is working well and will continue.  Refills provided.    Discussed pathophysiology/prognosis.  Discussed surgical options for Parkinson's.  Could consider nondopamine medications for Parkinson's    He does complain of back pain as well as some dizziness.  Will get an MRI of the L-spine to further evaluate.  Reflexes are absent with no other evidence of lumbar neurological disorder on exam.    Return in 4 to 5 months.  Recommend exercise and healthy lifestyle.    -     Multivitamin with copper  -     carbidopa-levodopa (SINEMET)  MG tablet; Take at least 30 min before meals or 2 hours after meals. Do not mix with food. Take 1 pill TID. Extra pills at bedtime as needed.  -     MR Lumbar Spine w/o Contrast; Future  - Continue pramipexole    Return in about 5 months (around 1/23/2025) for In-Clinic Visit (must), After testing.    Over 30 minutes were spent coordinating the care for the patient on the day of the encounter.  This includes previsit, during visit and post visit activities as documented above.  Counseling patient.  Prescription management with multiple problems reviewed/addressed.  New problem.  (Activities include but not inclusive of reviewing chart, reviewing outside records, reviewing labs and imaging study results as well as the images, patient visit time including getting history and exam,  use if applicable, review of test results with the patient and coming up with a plan in a shared model, counseling patient and family, education and answering patient questions, EMR , EMR diagnosis entry and problem list management, medication reconciliation and prescription management if applicable, paperwork if applicable, printing documents and documentation of the visit  activities.)        Nigel Page MD  Neurologist  [facility]  Tel:- [phone]    This note was dictated using voice recognition software.  Any grammatical or context distortions are unintentional and inherent to the software.    The longitudinal plan of care for the diagnosis(es)/condition(s) as documented were addressed during this visit. Due to the added complexity in care, I will continue to support Jaime in the subsequent management and with ongoing continuity of care.      Again, thank you for allowing me to participate in the care of your patient.        Sincerely,        Nigel Page MD    used

## 2024-08-23 NOTE — NURSING NOTE
"Jaime Saucedo is a 74 year old male who presents for:  Chief Complaint   Patient presents with    Parkinson     2 month follow up - Parkinson, tremors.  Doing better.         Initial Vitals:  /80   Pulse 57   Wt 77.1 kg (170 lb)   BMI 27.44 kg/m   Estimated body mass index is 27.44 kg/m  as calculated from the following:    Height as of 5/14/24: 1.676 m (5' 6\").    Weight as of this encounter: 77.1 kg (170 lb).. Body surface area is 1.89 meters squared. BP completed using cuff size: wrist cuff    Karan Geiger  "

## 2024-08-23 NOTE — TELEPHONE ENCOUNTER
"Directions on this prescription call for \"extra pills at bedtime as needed\",what is the dosage for bedtime?     Thank you,    Radha Manjarrez  Certified Pharmacy Technician II, [facility]  Ph: [phone]  Fx: [phone]    "

## 2024-08-23 NOTE — PROGRESS NOTES
NEUROLOGY OUTPATIENT PROGRESS NOTE   Aug 23, 2024     CHIEF COMPLAINT/REASON FOR VISIT/REASON FOR CONSULT  Patient presents with:  Parkinson: 2 month follow up - Parkinson, tremors.  Doing better.     REASON FOR CONSULTATION- Leg weakness    REFERRAL SOURCE  Dr. Gayla Griffith  CC Dr. Gayla Griffith    HISTORY OF PRESENT ILLNESS  Jaime Saucedo is a 74 year old male seen today for evaluation of leg weakness.  He reports that the symptoms came on in April 2022.  In April 2021 he did have an episode of COVID infection.  Did have some long-haul symptoms.  Right leg is more prominently involved than the left leg.  Symptoms have been progressive since then.  Does complain of some minor back pain.  No symptoms in his hands.  Does not drop things with his hands though there might be some difficulty with the right hand with squeezing things.  Does complain of some loss of balance at times.  Does have some poor vision which might be leading to loss of balance.  Does report some stiffness in his legs along with some numbness in the feet.  He did have a EMG done through [facility] which was negative for neuropathy.  He was started on Mirapex since he does not have a resting tremor on the right side for possible Parkinson's disease.  This has been helping occasionally it does help significantly at nighttime for restless leg syndrome.  Denies any major neck pain.    2/27/24  Patient returns today.  Feels that the symptoms are about the same.  No progression or any new symptoms.  Complains of balance issues.  Feels that that he has a plugged ear.  Wants to follow-up with the ENT provider.  I did put in a referral for him.  Pramipexole is helping at nighttime with the symptoms.  Discussed mechanism of action of pramipexole.  Does not want to try medications for right now and wants to get a DaTscan.  No new concerns.    5/14/24  Patient returns today  1.  He reports his symptoms are about the same as before.  He  did try the Sinemet which puts him to sleep.  Has not noticed any benefit.  The pramipexole does help somewhat with the symptoms though he does feel tired and sleepy.  Is only taking it during the daytime.  DaTscan is scheduled for next week  2.  He did complete the CT angiogram of/CTV.  No concerning findings  3.  Reports no other new symptoms.    5/31/24  Patient is today  1.  Continues to have the symptoms similar to before.  His DaTscan does show Parkinson's disease.  Discussed pathophysiology of the DaTscan as well as Parkinson's disease.  He is interested in trying the Sinemet 1 more time.  Pramipexole has been more helpful though we discussed risks with higher doses of pramipexole and wants to start with Sinemet first.  2.  Does have back pain and dizziness issues.  Discussed that this is probably unrelated to the Parkinson's and could look into it in the future time permitting.  No other new concerns.    8/23/24  Patient returns today  1.  Symptoms have improved with using the carbidopa and pramipexole.  Uses the pramipexole twice a day and the carbidopa levodopa 1 tablet 3 times a day.  No wearing off.  No side effects.  Overall is tolerating it better.  Has increased his exercise which is also helping.  No major issues with parkinsonism at this point  2.  Continues to complain of back pain.  No shooting pain down the legs.  No weakness numbness in the legs.  Is interested in getting an MRI of his the L-spine.  Feels that it is coming from lifting too much.  No new issues.    Previous history is reviewed and this is unchanged.    PAST MEDICAL/SURGICAL HISTORY  Past Medical History:   Diagnosis Date    Hypertension     Obstructive sleep apnea syndrome 6/1/2011    Pneumonia due to 2019 novel coronavirus 3/26/2021     Patient Active Problem List   Diagnosis    Hypertension goal BP (blood pressure) < 140/90    Diplopia    Vertigo    Hyperlipidemia LDL goal <130    Restless legs syndrome (RLS)    Obstructive  sleep apnea syndrome    Parkinson's disease with dyskinesia and fluctuating manifestations (H)   Significant for sleep disorder, vision symptoms, arthritis, tremors from Parkinson's disease, high blood pressure, high cholesterol    FAMILY HISTORY  Family History   Problem Relation Age of Onset    Hypertension Mother     Hypertension Father    Reviewed and negative for neurological problems    SOCIAL HISTORY  Social History     Tobacco Use    Smoking status: Never    Smokeless tobacco: Never   Vaping Use    Vaping status: Never Used   Substance Use Topics    Alcohol use: Yes     Comment: rare    Drug use: No       SYSTEMS REVIEW  Twelve-system ROS was done and other than the HPI this was negative/positive for back pain, arm and leg pain, joint pain, numbness/tingling, weakness paralysis, difficulty walking, balance/coordination problems, movement disorder, ringing in the ears, hearing loss, vision symptoms, sleepiness during the day, sleeping problems, bowel problems.  No new symptoms/issues.    MEDICATIONS  Current Outpatient Medications   Medication Sig Dispense Refill    carbidopa-levodopa (SINEMET)  MG tablet Take at least 30 min before meals or 2 hours after meals. Do not mix with food. Take 1 pill TID. Extra pills at bedtime as needed. 360 tablet 1    erythromycin (ROMYCIN) 5 MG/GM ophthalmic ointment Place 0.5 inches into both eyes at bedtime 3.5 g 0    fluticasone (FLONASE) 50 MCG/ACT nasal spray Spray 2 sprays into both nostrils daily 16 g 1    losartan (COZAAR) 25 MG tablet Take 0.5 tablets (12.5 mg) by mouth daily 45 tablet 4    pramipexole (MIRAPEX) 0.25 MG tablet TAKE ONE TABLET BY MOUTH THREE TIMES A DAY 90 tablet 4    sildenafil (REVATIO) 20 MG tablet 1-2 tablets 1 hour prior to sexual intercourse. 10 tablet 11    simvastatin (ZOCOR) 40 MG tablet Take 1 tablet (40 mg) by mouth at bedtime 90 tablet 4     No current facility-administered medications for this visit.        PHYSICAL  EXAMINATION  VITALS: /80   Pulse 57   Wt 77.1 kg (170 lb)   BMI 27.44 kg/m    GENERAL: Healthy appearing, alert, no acute distress, normal habitus.  CARDIOVASCULAR: Extremities warm and well perfused. Pulses present.   NEUROLOGICAL:  Patient is awake and oriented to self, place and time.  Attention span is normal.  Memory is grossly intact.  Language is fluent and follows commands appropriately.  Appropriate fund of knowledge. Cranial nerves 2-12 are intact. There is no pronator drift.  Motor exam shows 5/5 strength in all extremities.  Tone is symmetric in the lower extremities.  Mild resting right upper and lower extremity tremor present.  Cogwheeling present on the right and left upper extremity.  Reflexes are brisker on the right side and 2+ brisk in upper extremities and lower extremities. Sensory exam is grossly intact to light touch, pin prick and vibration.  Finger to nose and heel to shin is without dysmetria.  Romberg is negative.  Gait is normal and the patient is able to do tandem walk and walk on toes and heels with some difficulty.  Slight decreased arm swing.  Exam stable.  No major evidence of parkinsonism.    DIAGNOSTICS  MRI Brain 2022  CONCLUSION:   1. No acute infarcts, mass lesions or hemorrhage.   2.  Moderate burden of patchy and punctate deep white matter abnormality in both cerebral hemispheres, likely reflecting chronic small vessel ischemic disease, slightly increased overall since the 03/26/2021 comparison study.     CTA H&N  IMPRESSION:  1. Negative CT angiography of the head and neck.  2. Bilateral groundglass infiltrates in the upper lung zones. This can  be seen in COVID-19 patients, although other pneumonias can also give  this process.    RELEVANT LABS  TSH 2.33  B12 766  MILA positive  ESR negative  CRP negative  Serum protein electrophoresis negative  A1c 5.4  Methylmalonic acid 131  Ferritin 256    EMG      OUTSIDE RECORDS  Outside referral notes and chart notes were  reviewed and pertinent information has been summarized (in addition to the HPI):-      MRI C spine-images reviewed.  No major spinal stenosis.  IMPRESSION:  1. Multilevel degenerative changes of the cervical spine, as  described.  2. No spinal canal stenosis.  3. Mild/moderate left neural foraminal stenosis at C4-C5. Mild neural  foraminal narrowing elsewhere.  4. No spinal cord signal abnormality identified. No abnormal  enhancement seen.       MRI brain-images reviewed.  No major structural lesions noted  Significant T2 hyperintensities  IMPRESSION:  1. No definite acute intracranial process. Specifically, no findings  to suggest acute/early subacute infarct, intracranial hemorrhage,  extra axial fluid collection, or mass effect.  2. Brain atrophy and presumed chronic small vessel ischemic changes,  as described.  3. Diminished appearance of the T2 flow-voids within the lateral left  transverse and sigmoid dural venous sinuses, nonspecific. This may be  due to incidental slow flow or possibly stenosis. The possibility of  age-indeterminate dural venous sinus thrombosis/occlusion is difficult  to entirely excluded. If clinically warranted, a CT or MR venogram  with contrast could be considered for further evaluation.     Component      Latest Ref [facility] 1/22/2024  12:43 PM   Protein Total      6.4 - 8.3 g/dL 7.7    Albumin      3.5 - 5.2 g/dL 4.3    Bilirubin Total      <=1.2 mg/dL 0.5    Alkaline Phosphatase      40 - 150 U/L 85    AST      0 - 45 U/L 19    ALT      0 - 70 U/L 22    Bilirubin Direct      0.00 - 0.30 mg/dL <0.20    Neutrophil Cytoplasmic Antibody      <1:10  <1:10    Neutrophil Cytoplasmic Antibody Pattern The ANCA IFA is <1:10. No further testing will be performed.    Vitamin B12      232 - 1,245 pg/mL 876    TSH      0.30 - 4.20 uIU/mL 2.88    Hemoglobin A1C      <5.7 % 5.3    Ceruloplasmin      20 - 60 mg/dL 17 (L)    Copper      70.0 - 140.0 ug/dL 98.5    Angiotensin Converting Enzyme      16 - 85  U/L 30    Lyme Disease Antibodies Serum      <0.90  0.05       Legend:  (L) Low    CTA  IMPRESSION:   CTA Head: No evidence of large vessel occlusion or high-grade  stenosis.   CTA Neck: No evidence of large vessel occlusion or high-grade  stenosis.     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  Probable incidental arachnoid granulation within the hypoplastic left  transverse sinus.     MICHELLE scan  Impression:  Presynaptic dopaminergic deficit is present.      IMPRESSION/REPORT/PLAN  Right leg weakness  Resting tremor  Parkinson's disease  Hyperreflexia  T2 hyperintensities on MRI  Low ceruloplasmin  Medication side effects    This is a 74 year old male with progressive right-sided weakness with a right-sided resting tremor and cogwheeling in both upper extremities.  Exam further shows significant hyperreflexia more on the right side than the left.  Symptoms are not completely consistent with Parkinson's disease though could be a Parkinson's plus syndrome like cortical basal degeneration.  Could be secondary parkinsonism as well.    MRI brain overall noncontributory though does show some T2 hyperintensities.  Some of them are in a watershed pattern we will check a CT angiogram.  CTA/CTV were noncontributory.MRI C-spine did not show any spinal stenosis to explain the symptoms.  Blood work has been noncontributory except for low ceruloplasmin.  Has not been able to try the copper supplement so far.  Will recommend multivitamin with copper.  He is not doing this.    DaTscan was then done which was positive for Parkinson's disease.  Sinemet previously has caused somnolence though he is interested in retrying that.  He is also on Mirapex for restless leg syndrome which is actually helping with his symptoms.  Currently Sinemet and Mirapex combination is working well and will continue.  Refills provided.    Discussed  pathophysiology/prognosis.  Discussed surgical options for Parkinson's.  Could consider nondopamine medications for Parkinson's    He does complain of back pain as well as some dizziness.  Will get an MRI of the L-spine to further evaluate.  Reflexes are absent with no other evidence of lumbar neurological disorder on exam.    Return in 4 to 5 months.  Recommend exercise and healthy lifestyle.    -     Multivitamin with copper  -     carbidopa-levodopa (SINEMET)  MG tablet; Take at least 30 min before meals or 2 hours after meals. Do not mix with food. Take 1 pill TID. Extra pills at bedtime as needed.  -     MR Lumbar Spine w/o Contrast; Future  - Continue pramipexole    Return in about 5 months (around 1/23/2025) for In-Clinic Visit (must), After testing.    Over 30 minutes were spent coordinating the care for the patient on the day of the encounter.  This includes previsit, during visit and post visit activities as documented above.  Counseling patient.  Prescription management with multiple problems reviewed/addressed.  New problem.  (Activities include but not inclusive of reviewing chart, reviewing outside records, reviewing labs and imaging study results as well as the images, patient visit time including getting history and exam,  use if applicable, review of test results with the patient and coming up with a plan in a shared model, counseling patient and family, education and answering patient questions, EMR , EMR diagnosis entry and problem list management, medication reconciliation and prescription management if applicable, paperwork if applicable, printing documents and documentation of the visit activities.)        Nigel Page MD  Neurologist  [facility]  Tel:- [phone]    This note was dictated using voice recognition software.  Any grammatical or context distortions are unintentional and inherent to the software.    The  longitudinal plan of care for the diagnosis(es)/condition(s) as documented were addressed during this visit. Due to the added complexity in care, I will continue to support Jaime in the subsequent management and with ongoing continuity of care.

## 2024-09-06 ENCOUNTER — HOSPITAL ENCOUNTER (OUTPATIENT)
Dept: MRI IMAGING | Facility: CLINIC | Age: 74
Discharge: HOME | End: 2024-09-06
Attending: PSYCHIATRY & NEUROLOGY | Admitting: PSYCHIATRY & NEUROLOGY
Payer: COMMERCIAL

## 2024-09-06 DIAGNOSIS — M54.50 LOW BACK PAIN, UNSPECIFIED BACK PAIN LATERALITY, UNSPECIFIED CHRONICITY, UNSPECIFIED WHETHER SCIATICA PRESENT: ICD-10-CM

## 2024-09-06 PROCEDURE — 72148 MRI LUMBAR SPINE W/O DYE: CPT

## 2024-09-11 ENCOUNTER — TELEPHONE (OUTPATIENT)
Dept: NEUROLOGY | Facility: CLINIC | Age: 74
End: 2024-09-11
Payer: COMMERCIAL

## 2024-09-11 NOTE — TELEPHONE ENCOUNTER
Called and spoke with patient to clarify of what referral patient is asking. Last referral Provider put in was in Feb. 2024 for ENT Specialist; which patient already has upcoming appt for it. Then patient was wondering about his MRI done last Friday. Told patient the images has not been read yet and will update once we hear back from Provider. Patient understood and no further questions.     Karan 9/11/2024 12:41 PM

## 2024-09-11 NOTE — TELEPHONE ENCOUNTER
[facility] Call Center    Phone Message    May a detailed message be left on voicemail: yes     Reason for Call: Patient called states that he missed a call about 15 mins ago from the clinic regarding a referral. Please call back to discuss     Action Taken: Other: [facility] neurology    Travel Screening: Not Applicable     Date of Service:

## 2024-09-13 ENCOUNTER — TELEPHONE (OUTPATIENT)
Dept: NEUROLOGY | Facility: CLINIC | Age: 74
End: 2024-09-13
Payer: COMMERCIAL

## 2024-09-13 NOTE — TELEPHONE ENCOUNTER
Left patient a voicemail to call back.     Dr. Page is wondering if Jaime has a history of cancer. If yes, the radiologist recommends to get a repeat Lumbar Spine MRI with contrast done.     Please let us know if patient has had a history of cancer and if he would be willing to do a new MRI. If the patient agrees, Dr. Page will order an MRI with contrast.     Liza Panchal MA

## 2024-09-18 ENCOUNTER — PATIENT OUTREACH (OUTPATIENT)
Dept: CARE COORDINATION | Facility: CLINIC | Age: 74
End: 2024-09-18
Payer: COMMERCIAL

## 2024-09-27 ENCOUNTER — OFFICE VISIT (OUTPATIENT)
Dept: AUDIOLOGY | Facility: CLINIC | Age: 74
End: 2024-09-27
Payer: COMMERCIAL

## 2024-09-27 DIAGNOSIS — H90.3 SENSORINEURAL HEARING LOSS (SNHL) OF BOTH EARS: Primary | ICD-10-CM

## 2024-09-27 PROCEDURE — 92567 TYMPANOMETRY: CPT | Performed by: AUDIOLOGIST

## 2024-09-27 PROCEDURE — 92557 COMPREHENSIVE HEARING TEST: CPT | Performed by: AUDIOLOGIST

## 2024-09-27 NOTE — PROGRESS NOTES
AUDIOLOGY REPORT     SUMMARY: Audiology visit completed. See audiogram for results.       RECOMMENDATIONS: Follow-up with ENT.    Elinor Mejias, CCC-A  [place] Licensed Audiologist #[id number]

## 2024-10-02 ENCOUNTER — OFFICE VISIT (OUTPATIENT)
Dept: OTOLARYNGOLOGY | Facility: CLINIC | Age: 74
End: 2024-10-02
Attending: PSYCHIATRY & NEUROLOGY
Payer: COMMERCIAL

## 2024-10-02 ENCOUNTER — PATIENT OUTREACH (OUTPATIENT)
Dept: CARE COORDINATION | Facility: CLINIC | Age: 74
End: 2024-10-02

## 2024-10-02 DIAGNOSIS — H93.8X3 EAR FULLNESS, BILATERAL: ICD-10-CM

## 2024-10-02 DIAGNOSIS — Z91.81 AT RISK FOR FALLING: ICD-10-CM

## 2024-10-02 DIAGNOSIS — R26.81 UNSTEADY: ICD-10-CM

## 2024-10-02 DIAGNOSIS — H90.3 SENSORINEURAL HEARING LOSS (SNHL) OF BOTH EARS: Primary | ICD-10-CM

## 2024-10-02 DIAGNOSIS — H93.8X1 EAR FULLNESS, RIGHT: ICD-10-CM

## 2024-10-02 DIAGNOSIS — R09.81 NASAL CONGESTION: ICD-10-CM

## 2024-10-02 PROCEDURE — 99214 OFFICE O/P EST MOD 30 MIN: CPT | Performed by: OTOLARYNGOLOGY

## 2024-10-02 RX ORDER — AZELASTINE 1 MG/ML
SPRAY, METERED NASAL
Qty: 30 ML | Refills: 11 | Status: SHIPPED | OUTPATIENT
Start: 2024-10-02

## 2024-10-02 NOTE — LETTER
"10/2/2024      Jaime Saucedo  [address] [address] [address]      Dear Colleague,    Thank you for referring your patient, Jaime Saucedo, to the [facility]. Please see a copy of my visit note below.    CHIEF COMPLAINT: Patient presents with:  Balance/ Vestibular: Started this past summer pt has perkiness and some vision issues as well. He is also have a plugged feeling in his ears. Pt is also having some tinnitus a high pitched noise with other load noises. Also a low noise when a car drives by or when certain songs play. Has a hard time hearing in crowds.           HISTORY OF PRESENT ILLNESS    Jaime was seen at the behest of Nigel Page MD for dizziness.  He has \"gurgling\" in his ears when he swallows. Has difficulty in background noise.  He reports a low frequency sounds (cars doing by, airplanes, toilet flushing)  reverberate in his ear (like a tuning fork).   He is lightheaded in the morning and worries about falling.  He has sinus congestion and had a sinus infection lasting for \"100 days\".       MR brain 2/6/2024    MPRESSION:  1. No definite acute intracranial process. Specifically, no findings  to suggest acute/early subacute infarct, intracranial hemorrhage,  extra axial fluid collection, or mass effect.  2. Brain atrophy and presumed chronic small vessel ischemic changes,  as described.  3. Diminished appearance of the T2 flow-voids within the lateral left  transverse and sigmoid dural venous sinuses, nonspecific. This may be  due to incidental slow flow or possibly stenosis. The possibility of  age-indeterminate dural venous sinus thrombosis/occlusion is difficult  to entirely excluded. If clinically warranted, a CT or MR venogram  with contrast could be considered for further evaluation.         AUDIOLOGY NOTE:      HISTORY: Hx of Parkinson' s Disease. Patient reports decreased hearing over the years and difficulty when background noise is present. He " "reports the right ear is worse. He reports having a bad sinus infection about 1. 5 years ago that lasted for \" 100 days\" . He reports using flonase without much relief. He feels he is constantly under water and does have \" gurgling\" in the ears. He has a long- standing high- pitched tinnitus but for the past few months low- frequency noises in his environment cause a different noise in his ears. He reports feeling off balance, worse in the morning, but denies falls. He denies otalgia, otorrhea, past ear surgery, family history of hearing loss, noise exposure, and prior use of amplification. RESULTS: Otoscopy: clear ear canals bilaterally. Tymps: Type As right ear and Type A left ear. Reflexes: Absent right ipsi; CNT left ipsi due to inability to maintain seal; CNT contra due to equipment limitations. Audio: Right: normal sloping to mild at 1 kHz rising to normal then sloping to moderate      REFERRAL NOTE:    2/27/24  Patient returns today.  Feels that the symptoms are about the same.  No progression or any new symptoms.  Complains of balance issues.  Feels that that he has a plugged ear.  Wants to follow-up with the ENT provider.  I did put in a referral for him.  Pramipexole is helping at nighttime with the symptoms.  Discussed mechanism of action of pramipexole.  Does not want to try medications for right now and wants to get a DaTscan.  No new concerns         REVIEW OF SYSTEMS    Review of Systems as per HPI and PMHx, otherwise 10 system review system are negative.       ALLERGIES    Patient has no known allergies.    CURRENT MEDICATIONS      Current Outpatient Medications:      azelastine (ASTELIN) 0.1 % nasal spray, 2 sprays each nostril 1-2x daily as needed for nasal congestion (use nightly for first 2 week), Disp: 30 mL, Rfl: 11     carbidopa-levodopa (SINEMET)  MG tablet, Take at least 30 min before meals or 2 hours after meals. Do not mix with food. Take 1 pill TID. Extra pills at bedtime as " needed., Disp: 360 tablet, Rfl: 1     erythromycin (ROMYCIN) 5 MG/GM ophthalmic ointment, Place 0.5 inches into both eyes at bedtime, Disp: 3.5 g, Rfl: 0     losartan (COZAAR) 25 MG tablet, Take 0.5 tablets (12.5 mg) by mouth daily, Disp: 45 tablet, Rfl: 4     pramipexole (MIRAPEX) 0.25 MG tablet, TAKE ONE TABLET BY MOUTH THREE TIMES A DAY, Disp: 90 tablet, Rfl: 4     sildenafil (REVATIO) 20 MG tablet, 1-2 tablets 1 hour prior to sexual intercourse., Disp: 10 tablet, Rfl: 11     simvastatin (ZOCOR) 40 MG tablet, Take 1 tablet (40 mg) by mouth at bedtime, Disp: 90 tablet, Rfl: 4     PAST MEDICAL HISTORY    PAST MEDICAL HISTORY:   Past Medical History:   Diagnosis Date     Hypertension      Obstructive sleep apnea syndrome 6/1/2011     Pneumonia due to 2019 novel coronavirus 3/26/2021       PAST SURGICAL HISTORY    PAST SURGICAL HISTORY:   Past Surgical History:   Procedure Laterality Date     ARTHROSCOPY KNEE RT/LT Bilateral 2006    partial meniscectomy     PHACOEMULSIFICATION WITH STANDARD INTRAOCULAR LENS IMPLANT Left 3/6/2019    Procedure: Cataract Removal with Implant;  Surgeon: Johan Anthony MD;  Location: [facility]     PHACOEMULSIFICATION WITH STANDARD INTRAOCULAR LENS IMPLANT Right 4/28/2021    Procedure: Cataract extraction with implant.;  Surgeon: Johan Anthony MD;  Location: [facility] OR       FAMILY  HISTORY    FAMILY HISTORY:   Family History   Problem Relation Age of Onset     Hypertension Mother      Hypertension Father        SOCIAL HISTORY    SOCIAL HISTORY:   Social History     Tobacco Use     Smoking status: Never     Smokeless tobacco: Never   Substance Use Topics     Alcohol use: Yes     Comment: rare        PHYSICAL EXAM    HEAD: Normal appearance and symmetry:  No cutaneous lesions.      NECK:  supple     EARS:    Right:   TM intact nl   LEFT:   TM inact nl     EYES:  EOMI    CN VII/XII:  intact     NOSE:     Dorsum:   straight  Septum:  midline  Mucosa:  moist  ITH: 3+      ORAL  CAVITY/OROPHARYNX:     Lips:  Normal.  Tongue: normal, midline  Mucosa:   no lesions     NECK:  Trachea:  midline.              Thyroid:  normal              Adenopathy:  none        NEURO:   Alert and Oriented     GAIT AND STATION:  normal     RESPIRATORY:   Symmetry and Respiratory effort     PSYCH:  Normal mood and affect     SKIN:   warm and dry         IMPRESSION:    Encounter Diagnoses   Name Primary?     Unsteady      Sensorineural hearing loss (SNHL) of both ears Yes     Ear fullness, bilateral      At risk for falling      Nasal congestion      Ear fullness, right           RECOMMENDATIONS:      Orders Placed This Encounter   Procedures     CT Temporal Bones wo Contrast     CT Sinus w/o Contrast     Physical Therapy  Referral      Orders Placed This Encounter   Medications     azelastine (ASTELIN) 0.1 % nasal spray     Si sprays each nostril 1-2x daily as needed for nasal congestion (use nightly for first 2 week)     Dispense:  30 mL     Refill:  11      Trial of astelin spray  Return annually for hearing test  Results via [facility]      Again, thank you for allowing me to participate in the care of your patient.        Sincerely,        Misbah Campo MD

## 2024-10-02 NOTE — PROGRESS NOTES
"CHIEF COMPLAINT: Patient presents with:  Balance/ Vestibular: Started this past summer pt has perkiness and some vision issues as well. He is also have a plugged feeling in his ears. Pt is also having some tinnitus a high pitched noise with other load noises. Also a low noise when a car drives by or when certain songs play. Has a hard time hearing in crowds.           HISTORY OF PRESENT ILLNESS    Jaime was seen at the behest of Nigel Page MD for dizziness.  He has \"gurgling\" in his ears when he swallows. Has difficulty in background noise.  He reports a low frequency sounds (cars doing by, airplanes, toilet flushing)  reverberate in his ear (like a tuning fork).   He is lightheaded in the morning and worries about falling.  He has sinus congestion and had a sinus infection lasting for \"100 days\".       MR brain 2/6/2024    MPRESSION:  1. No definite acute intracranial process. Specifically, no findings  to suggest acute/early subacute infarct, intracranial hemorrhage,  extra axial fluid collection, or mass effect.  2. Brain atrophy and presumed chronic small vessel ischemic changes,  as described.  3. Diminished appearance of the T2 flow-voids within the lateral left  transverse and sigmoid dural venous sinuses, nonspecific. This may be  due to incidental slow flow or possibly stenosis. The possibility of  age-indeterminate dural venous sinus thrombosis/occlusion is difficult  to entirely excluded. If clinically warranted, a CT or MR venogram  with contrast could be considered for further evaluation.         AUDIOLOGY NOTE:      HISTORY: Hx of Parkinson' s Disease. Patient reports decreased hearing over the years and difficulty when background noise is present. He reports the right ear is worse. He reports having a bad sinus infection about 1. 5 years ago that lasted for \" 100 days\" . He reports using flonase without much relief. He feels he is constantly under water and does have \" gurgling\" in the ears. He " has a long- standing high- pitched tinnitus but for the past few months low- frequency noises in his environment cause a different noise in his ears. He reports feeling off balance, worse in the morning, but denies falls. He denies otalgia, otorrhea, past ear surgery, family history of hearing loss, noise exposure, and prior use of amplification. RESULTS: Otoscopy: clear ear canals bilaterally. Tymps: Type As right ear and Type A left ear. Reflexes: Absent right ipsi; CNT left ipsi due to inability to maintain seal; CNT contra due to equipment limitations. Audio: Right: normal sloping to mild at 1 kHz rising to normal then sloping to moderate      REFERRAL NOTE:    2/27/24  Patient returns today.  Feels that the symptoms are about the same.  No progression or any new symptoms.  Complains of balance issues.  Feels that that he has a plugged ear.  Wants to follow-up with the ENT provider.  I did put in a referral for him.  Pramipexole is helping at nighttime with the symptoms.  Discussed mechanism of action of pramipexole.  Does not want to try medications for right now and wants to get a DaTscan.  No new concerns         REVIEW OF SYSTEMS    Review of Systems as per HPI and PMHx, otherwise 10 system review system are negative.       ALLERGIES    Patient has no known allergies.    CURRENT MEDICATIONS      Current Outpatient Medications:     azelastine (ASTELIN) 0.1 % nasal spray, 2 sprays each nostril 1-2x daily as needed for nasal congestion (use nightly for first 2 week), Disp: 30 mL, Rfl: 11    carbidopa-levodopa (SINEMET)  MG tablet, Take at least 30 min before meals or 2 hours after meals. Do not mix with food. Take 1 pill TID. Extra pills at bedtime as needed., Disp: 360 tablet, Rfl: 1    erythromycin (ROMYCIN) 5 MG/GM ophthalmic ointment, Place 0.5 inches into both eyes at bedtime, Disp: 3.5 g, Rfl: 0    losartan (COZAAR) 25 MG tablet, Take 0.5 tablets (12.5 mg) by mouth daily, Disp: 45 tablet, Rfl: 4     pramipexole (MIRAPEX) 0.25 MG tablet, TAKE ONE TABLET BY MOUTH THREE TIMES A DAY, Disp: 90 tablet, Rfl: 4    sildenafil (REVATIO) 20 MG tablet, 1-2 tablets 1 hour prior to sexual intercourse., Disp: 10 tablet, Rfl: 11    simvastatin (ZOCOR) 40 MG tablet, Take 1 tablet (40 mg) by mouth at bedtime, Disp: 90 tablet, Rfl: 4     PAST MEDICAL HISTORY    PAST MEDICAL HISTORY:   Past Medical History:   Diagnosis Date    Hypertension     Obstructive sleep apnea syndrome 6/1/2011    Pneumonia due to 2019 novel coronavirus 3/26/2021       PAST SURGICAL HISTORY    PAST SURGICAL HISTORY:   Past Surgical History:   Procedure Laterality Date    ARTHROSCOPY KNEE RT/LT Bilateral 2006    partial meniscectomy    PHACOEMULSIFICATION WITH STANDARD INTRAOCULAR LENS IMPLANT Left 3/6/2019    Procedure: Cataract Removal with Implant;  Surgeon: Johan Anthony MD;  Location: [facility]    PHACOEMULSIFICATION WITH STANDARD INTRAOCULAR LENS IMPLANT Right 4/28/2021    Procedure: Cataract extraction with implant.;  Surgeon: Johan Anthony MD;  Location: [facility]       FAMILY  HISTORY    FAMILY HISTORY:   Family History   Problem Relation Age of Onset    Hypertension Mother     Hypertension Father        SOCIAL HISTORY    SOCIAL HISTORY:   Social History     Tobacco Use    Smoking status: Never    Smokeless tobacco: Never   Substance Use Topics    Alcohol use: Yes     Comment: rare        PHYSICAL EXAM    HEAD: Normal appearance and symmetry:  No cutaneous lesions.      NECK:  supple     EARS:    Right:   TM intact nl   LEFT:   TM inact nl     EYES:  EOMI    CN VII/XII:  intact     NOSE:     Dorsum:   straight  Septum:  midline  Mucosa:  moist  ITH: 3+      ORAL CAVITY/OROPHARYNX:     Lips:  Normal.  Tongue: normal, midline  Mucosa:   no lesions     NECK:  Trachea:  midline.              Thyroid:  normal              Adenopathy:  none        NEURO:   Alert and Oriented     GAIT AND STATION:  normal     RESPIRATORY:   Symmetry and  Respiratory effort     PSYCH:  Normal mood and affect     SKIN:   warm and dry         IMPRESSION:    Encounter Diagnoses   Name Primary?    Unsteady     Sensorineural hearing loss (SNHL) of both ears Yes    Ear fullness, bilateral     At risk for falling     Nasal congestion     Ear fullness, right           RECOMMENDATIONS:      Orders Placed This Encounter   Procedures    CT Temporal Bones wo Contrast    CT Sinus w/o Contrast    Physical Therapy  Referral      Orders Placed This Encounter   Medications    azelastine (ASTELIN) 0.1 % nasal spray     Si sprays each nostril 1-2x daily as needed for nasal congestion (use nightly for first 2 week)     Dispense:  30 mL     Refill:  11      Trial of astelin spray  Return annually for hearing test  Results via [facility]

## 2024-10-04 ENCOUNTER — HOSPITAL ENCOUNTER (OUTPATIENT)
Dept: CT IMAGING | Facility: HOSPITAL | Age: 74
Discharge: HOME | End: 2024-10-04
Attending: OTOLARYNGOLOGY
Payer: COMMERCIAL

## 2024-10-04 DIAGNOSIS — R09.81 NASAL CONGESTION: ICD-10-CM

## 2024-10-04 DIAGNOSIS — H93.8X3 EAR FULLNESS, BILATERAL: ICD-10-CM

## 2024-10-04 PROCEDURE — 70480 CT ORBIT/EAR/FOSSA W/O DYE: CPT

## 2024-10-04 PROCEDURE — 70486 CT MAXILLOFACIAL W/O DYE: CPT

## 2024-10-08 DIAGNOSIS — N52.9 ERECTILE DYSFUNCTION, UNSPECIFIED ERECTILE DYSFUNCTION TYPE: ICD-10-CM

## 2024-10-10 ENCOUNTER — THERAPY VISIT (OUTPATIENT)
Dept: PHYSICAL THERAPY | Facility: CLINIC | Age: 74
End: 2024-10-10
Attending: OTOLARYNGOLOGY
Payer: COMMERCIAL

## 2024-10-10 DIAGNOSIS — Z91.81 AT RISK FOR FALLING: ICD-10-CM

## 2024-10-10 DIAGNOSIS — N52.9 ERECTILE DYSFUNCTION, UNSPECIFIED ERECTILE DYSFUNCTION TYPE: ICD-10-CM

## 2024-10-10 PROCEDURE — 97161 PT EVAL LOW COMPLEX 20 MIN: CPT | Mod: GP

## 2024-10-10 PROCEDURE — 97112 NEUROMUSCULAR REEDUCATION: CPT | Mod: GP

## 2024-10-10 PROCEDURE — 97530 THERAPEUTIC ACTIVITIES: CPT | Mod: GP

## 2024-10-10 RX ORDER — SILDENAFIL CITRATE 20 MG/1
TABLET ORAL
Qty: 10 TABLET | Refills: 11 | OUTPATIENT
Start: 2024-10-10

## 2024-10-10 NOTE — PROGRESS NOTES
"PHYSICAL THERAPY EVALUATION  Type of Visit: Evaluation       Fall Risk Screen:  Fall screen completed by: PT  Have you fallen 2 or more times in the past year?: No  Have you fallen and had an injury in the past year?: No  Timed Up and Go score (seconds): 10  Is patient a fall risk?: No  Fall screen comments: pt feels off balanced all the time and is cautious when walking    Subjective       Presenting condition or subjective complaint: loss of balance. Pt feels like he is weaving when he walks like he's drunk. His L>R ear feels plugged for the last month. He has felt off balanced for a year and a half. It started after having a bad sinus infection that plugged his ears.  Date of onset: 09/10/24    Relevant medical history:     Dates & types of surgery: knee 1980    Prior diagnostic imaging/testing results: CT scan; Bone scan     Prior therapy history for the same diagnosis, illness or injury: No      Prior Level of Function  Independent    Living Environment  Social support: With a significant other or spouse   Type of home: House; Multi-level   Stairs to enter the home: Yes 2 Is there a railing: No     Ramp: No   Stairs inside the home: Yes 29 Is there a railing: Yes     Help at home: None  Equipment owned:       Employment:      Hobbies/Interests:      Patient goals for therapy: walk    Pain assessment: Pain denied     Objective   Cognitive Status Examination  normal    GAIT:   Normal michelle, short step length    BALANCE:     SPECIAL TESTS  5 Times Sit-to-Stand (5TSTS) 7\"     Dynamic Gait Index (DGI) 24/24   Timed Up and Go (TUG) - sec 7\"   Single Leg Stance Right (sec) 24\"   Single Leg Stance Left (sec) 28\"   Modified CTSIB Conditions (sec) Cond 1: 30\"  Cond 2: 30\"  Cond 4: 30\"  Cond 5 : 14\"   30 Second Sit to Stand (reps/height) 20x   Mini-BESTest  20/28           Cervicogenic Screen    Neck ROM WFL with incr feeling of off balance with B rot  ; tender suboccipitals       Oculomotor Screen    Ocular ROM Normal " "  Smooth Pursuit Normal   Saccades Normal   VOR Normal and reports \"off balance\" with horizontal head turns   Head Impulse Test Normal and  reports \"off balance\" with horizontal head turns   Convergence Testing Normal        Infrared Goggle Exam Vestibular Suppressant in Last 24 Hours? No  Exam Completed With: Infrared goggles   Spontaneous Nystagmus Negative   Gaze Evoked Nystagmus Negative    Left Right   Hiram-Hallpike Negative Negative   HSCC Supine Roll Test Negative Negative   Supine Head-Hanging Test Negative             VESTIBULAR EVALUATION  ADDITIONAL HISTORY:  Description of symptoms: Off balance; Blurry or jumping vision; Light-headedness  Dizzy attacks:   Start: jan 20244   Last attack: its constant   Frequency of occurrences: all the time   Length of attack: akk the time  Difficulty hearing: Both ears  Noise in ears? Yes high ring bass amplfication  Alleviates symptoms: quiet  Worsens symptoms: loud noise  Activities that bring on symptoms: Bending over; Turning while walking; Shopping at the grocery or mall       Pertinent visual history: wears glasses  Pertinent history of current vestibular problem: Hearing loss  DHI: Total Score: 22    Assessment & Plan   CLINICAL IMPRESSIONS  Medical Diagnosis: At risk for falling    Treatment Diagnosis: impaired balance   Impression/Assessment: Patient is a 74 year old male with balance complaints.  The following significant findings have been identified: Impaired balance and Impaired vision. These impairments interfere with their ability to perform household mobility and community mobility as compared to previous level of function.     Clinical Decision Making (Complexity):  Clinical Presentation: Stable/Uncomplicated  Clinical Presentation Rationale: based on medical and personal factors listed in PT evaluation  Clinical Decision Making (Complexity): Low complexity    PLAN OF CARE  Treatment Interventions:  Interventions: Neuromuscular Re-education, Therapeutic " Exercise    Long Term Goals     PT Goal 1  Goal Identifier: 1  Goal Description: Pt will increase SLS to 45 seconds  Rationale: to maximize safety and independence within the home;to maximize safety and independence within the community  Target Date: 11/21/24  PT Goal 2  Goal Identifier: 2  Goal Description: Pt will increase mCTSIB score to 115  Rationale: to maximize safety and independence within the home;to maximize safety and independence within the community  Target Date: 12/12/24  PT Goal 3  Goal Identifier: 3  Goal Description: Pt will report no feeling of off balance with head turns, bending over, getting out of bed  Rationale: to maximize safety and independence within the community  Target Date: 01/02/25  PT Goal 4  Goal Identifier: 4  Goal Description: Pt will be independent with HEP for optimal functional recovery.  Target Date: 01/02/25      Frequency of Treatment: 1x/week  Duration of Treatment: 12 weeks    Education Assessment:   Learner/Method: Patient;Listening;Demonstration;No Barriers to Learning    Risks and benefits of evaluation/treatment have been explained.   Patient/Family/caregiver agrees with Plan of Care.     Evaluation Time:     PT Eval, Low Complexity Minutes (29714): 20       Signing Clinician: Malini Clark PT        [facility]                                                                                   OUTPATIENT PHYSICAL THERAPY      PLAN OF TREATMENT FOR OUTPATIENT REHABILITATION   Patient's Last Name, First Name, Jaime Munoz YOB: 1950   Provider's Name   [facility]   Medical Record No.  [id number]     Onset Date: 09/10/24  Start of Care Date: 10/10/24     Medical Diagnosis:  At risk for falling      PT Treatment Diagnosis:  impaired balance Plan of Treatment  Frequency/Duration: 1x/week/ 12 weeks    Certification date from 10/10/24 to 01/02/25         See note for plan of treatment  details and functional goals     Malini Clark, PT                         I CERTIFY THE NEED FOR THESE SERVICES FURNISHED UNDER        THIS PLAN OF TREATMENT AND WHILE UNDER MY CARE     (Physician attestation of this document indicates review and certification of the therapy plan).              Referring Provider:  Misbah Campo    Initial Assessment  See Epic Evaluation- Start of Care Date: 10/10/24

## 2024-10-11 RX ORDER — SILDENAFIL CITRATE 20 MG/1
TABLET ORAL
Qty: 10 TABLET | Refills: 0 | Status: SHIPPED | OUTPATIENT
Start: 2024-10-11 | End: 2024-12-20

## 2024-10-16 ENCOUNTER — TELEPHONE (OUTPATIENT)
Dept: OTOLARYNGOLOGY | Facility: CLINIC | Age: 74
End: 2024-10-16
Payer: COMMERCIAL

## 2024-10-16 NOTE — TELEPHONE ENCOUNTER
Attempted to reach pt  but was unable to leave a message. Will try again later.   Juanita Jacobs RN on 10/16/2024 at 2:21 PM

## 2024-10-16 NOTE — TELEPHONE ENCOUNTER
[facility]    Phone Message    May a detailed message be left on voicemail: yes     Reason for Call: Other: The pt is asking for the results of the recent CT's he had done on 10.4.24. Please call the pt to discuss. Thanks.     Action Taken: Message routed to:  Other: [facility]    Travel Screening: Not Applicable     Date of Service:

## 2024-10-17 ENCOUNTER — THERAPY VISIT (OUTPATIENT)
Dept: PHYSICAL THERAPY | Facility: CLINIC | Age: 74
End: 2024-10-17
Attending: OTOLARYNGOLOGY
Payer: COMMERCIAL

## 2024-10-17 DIAGNOSIS — Z91.81 AT RISK FOR FALLING: Primary | ICD-10-CM

## 2024-10-17 PROCEDURE — 97112 NEUROMUSCULAR REEDUCATION: CPT | Mod: GP

## 2024-10-21 NOTE — TELEPHONE ENCOUNTER
Left 3rd/final message for pt to call back.   Will wait for his returned phone call.   Juanita Jacobs RN on 10/21/2024 at 3:24 PM

## 2024-10-22 ENCOUNTER — TELEPHONE (OUTPATIENT)
Dept: OTOLARYNGOLOGY | Facility: CLINIC | Age: 74
End: 2024-10-22
Payer: COMMERCIAL

## 2024-10-22 NOTE — TELEPHONE ENCOUNTER
[facility]    Phone Message    May a detailed message be left on voicemail: yes     Reason for Call: Other: Pt would like a call back to review CAT scan results of his sinus from 10/4.  [facility], [facility]     Action Taken: Other: ENT    Travel Screening: Not Applicable     Date of Service:

## 2024-10-22 NOTE — TELEPHONE ENCOUNTER
Pt notified of results. He states Astrupali hasn't been helping much yet. He is only using at bedtime. Advised he could increase to 1-2 times daily as needed. He will continue with the current plan and call back to schedule an appointment if needed.   Juanita Jacobs RN on 10/22/2024 at 3:16 PM

## 2024-10-24 ENCOUNTER — THERAPY VISIT (OUTPATIENT)
Dept: PHYSICAL THERAPY | Facility: CLINIC | Age: 74
End: 2024-10-24
Attending: OTOLARYNGOLOGY
Payer: COMMERCIAL

## 2024-10-24 DIAGNOSIS — Z91.81 AT RISK FOR FALLING: Primary | ICD-10-CM

## 2024-10-24 PROCEDURE — 97112 NEUROMUSCULAR REEDUCATION: CPT | Mod: GP

## 2024-10-31 ENCOUNTER — THERAPY VISIT (OUTPATIENT)
Dept: PHYSICAL THERAPY | Facility: CLINIC | Age: 74
End: 2024-10-31
Attending: OTOLARYNGOLOGY
Payer: COMMERCIAL

## 2024-10-31 DIAGNOSIS — Z91.81 AT RISK FOR FALLING: Primary | ICD-10-CM

## 2024-10-31 PROCEDURE — 97112 NEUROMUSCULAR REEDUCATION: CPT | Mod: GP

## 2024-11-25 ENCOUNTER — TELEPHONE (OUTPATIENT)
Dept: OTOLARYNGOLOGY | Facility: CLINIC | Age: 74
End: 2024-11-25
Payer: COMMERCIAL

## 2024-11-25 NOTE — TELEPHONE ENCOUNTER
[facility]    Phone Message    May a detailed message be left on voicemail: yes     Reason for Call: Other: Pt is scheduled for next available but it is not until May, he said the medication he was given did not help his ear and he's wondering what he should do in the meantime, thanks     Action Taken: Other: ENT    Travel Screening: Not Applicable     Date of Service:

## 2024-11-25 NOTE — TELEPHONE ENCOUNTER
Spoke to pt and rescheduled appointment for December.   Juanita Jacobs RN on 11/25/2024 at 2:45 PM

## 2024-12-18 ENCOUNTER — OFFICE VISIT (OUTPATIENT)
Dept: OTOLARYNGOLOGY | Facility: CLINIC | Age: 74
End: 2024-12-18
Payer: COMMERCIAL

## 2024-12-18 DIAGNOSIS — H93.8X3 EAR FULLNESS, BILATERAL: Primary | ICD-10-CM

## 2024-12-18 PROCEDURE — 99213 OFFICE O/P EST LOW 20 MIN: CPT | Performed by: OTOLARYNGOLOGY

## 2024-12-18 NOTE — LETTER
12/18/2024      Jaime Saucedo  [address] [address] [id number]      Dear Colleague,    Thank you for referring your patient, Jaime Saucedo, to the [facility]. Please see a copy of my visit note below.        ENT FOLLOW UP VISIT NOTE    Patient presents with:  Follow Up: Follow up for ears, PT reports that he is having a popping in his ear, and is hearing low base sounds mostly left ear.        HISTORY OF PRESENT ILLNESS    Jaime was seen in follow up for ear fullness (left ear more than right).    He is having improvement in his balance (Wobble board).   Ear fullness is improved.  No new ENT concerns.   CT temporal bones was wnl.  Sinus CT was unremarkable.   Pets at home = 1 cat.           ALLERGIES    Patient has no known allergies.    CURRENT MEDICATIONS      Current Outpatient Medications:      azelastine (ASTELIN) 0.1 % nasal spray, 2 sprays each nostril 1-2x daily as needed for nasal congestion (use nightly for first 2 week), Disp: 30 mL, Rfl: 11     carbidopa-levodopa (SINEMET)  MG tablet, Take at least 30 min before meals or 2 hours after meals. Do not mix with food. Take 1 pill TID. Extra pills at bedtime as needed., Disp: 360 tablet, Rfl: 1     erythromycin (ROMYCIN) 5 MG/GM ophthalmic ointment, Place 0.5 inches into both eyes at bedtime, Disp: 3.5 g, Rfl: 0     losartan (COZAAR) 25 MG tablet, Take 0.5 tablets (12.5 mg) by mouth daily, Disp: 45 tablet, Rfl: 4     pramipexole (MIRAPEX) 0.25 MG tablet, TAKE ONE TABLET BY MOUTH THREE TIMES A DAY, Disp: 90 tablet, Rfl: 4     sildenafil (REVATIO) 20 MG tablet, 1-2 tablets 1 hour prior to sexual intercourse., Disp: 10 tablet, Rfl: 0     simvastatin (ZOCOR) 40 MG tablet, Take 1 tablet (40 mg) by mouth at bedtime, Disp: 90 tablet, Rfl: 4     PAST MEDICAL HISTORY    PAST MEDICAL HISTORY:   Past Medical History:   Diagnosis Date     Hypertension      Obstructive sleep apnea syndrome 6/1/2011     Pneumonia due to 2019  novel coronavirus 3/26/2021       PAST SURGICAL HISTORY    PAST SURGICAL HISTORY:   Past Surgical History:   Procedure Laterality Date     ARTHROSCOPY KNEE RT/LT Bilateral 2006    partial meniscectomy     PHACOEMULSIFICATION WITH STANDARD INTRAOCULAR LENS IMPLANT Left 3/6/2019    Procedure: Cataract Removal with Implant;  Surgeon: Johan Anthony MD;  Location: [facility]     PHACOEMULSIFICATION WITH STANDARD INTRAOCULAR LENS IMPLANT Right 4/28/2021    Procedure: Cataract extraction with implant.;  Surgeon: Johan Anthony MD;  Location: [facility]       FAMILY  HISTORY    FAMILY HISTORY:   Family History   Problem Relation Age of Onset     Hypertension Mother      Hypertension Father        SOCIAL HISTORY    SOCIAL HISTORY:   Social History     Tobacco Use     Smoking status: Never     Smokeless tobacco: Never   Substance Use Topics     Alcohol use: Yes     Comment: rare        PHYSICAL EXAM    HEAD: Normal appearance and symmetry:  No cutaneous lesions.      NECK:  supple     EARS:  Auricles normal without lesions    EYES:  EOMI    CN VII/XII:  intact     NOSE:  patent        ORAL CAVITY/OROPHARYNX:     Lips:  Normal.  Tongue: normal, midline  Mucosa:   no lesions     NECK:  Trachea:  midline.        NEURO:   Alert and Oriented        RESPIRATORY:   Symmetry and Respiratory effort     PSYCH:  Normal mood and affect     SKIN:   warm and dry         IMPRESSION:    Encounter Diagnosis   Name Primary?     Ear fullness, bilateral Yes          RECOMMENDATIONS:    Patient with ear fullness, improved since last vist.   Dizziness has improved with balance therapy.  Return annually for hearing test (September 2025)        Again, thank you for allowing me to participate in the care of your patient.        Sincerely,        Misbah Campo MD

## 2024-12-18 NOTE — PROGRESS NOTES
ENT FOLLOW UP VISIT NOTE    Patient presents with:  Follow Up: Follow up for ears, PT reports that he is having a popping in his ear, and is hearing low base sounds mostly left ear.        HISTORY OF PRESENT ILLNESS    Jaime was seen in follow up for ear fullness (left ear more than right).    He is having improvement in his balance (Wobble board).   Ear fullness is improved.  No new ENT concerns.   CT temporal bones was wnl.  Sinus CT was unremarkable.   Pets at home = 1 cat.           ALLERGIES    Patient has no known allergies.    CURRENT MEDICATIONS      Current Outpatient Medications:     azelastine (ASTELIN) 0.1 % nasal spray, 2 sprays each nostril 1-2x daily as needed for nasal congestion (use nightly for first 2 week), Disp: 30 mL, Rfl: 11    carbidopa-levodopa (SINEMET)  MG tablet, Take at least 30 min before meals or 2 hours after meals. Do not mix with food. Take 1 pill TID. Extra pills at bedtime as needed., Disp: 360 tablet, Rfl: 1    erythromycin (ROMYCIN) 5 MG/GM ophthalmic ointment, Place 0.5 inches into both eyes at bedtime, Disp: 3.5 g, Rfl: 0    losartan (COZAAR) 25 MG tablet, Take 0.5 tablets (12.5 mg) by mouth daily, Disp: 45 tablet, Rfl: 4    pramipexole (MIRAPEX) 0.25 MG tablet, TAKE ONE TABLET BY MOUTH THREE TIMES A DAY, Disp: 90 tablet, Rfl: 4    sildenafil (REVATIO) 20 MG tablet, 1-2 tablets 1 hour prior to sexual intercourse., Disp: 10 tablet, Rfl: 0    simvastatin (ZOCOR) 40 MG tablet, Take 1 tablet (40 mg) by mouth at bedtime, Disp: 90 tablet, Rfl: 4     PAST MEDICAL HISTORY    PAST MEDICAL HISTORY:   Past Medical History:   Diagnosis Date    Hypertension     Obstructive sleep apnea syndrome 6/1/2011    Pneumonia due to 2019 novel coronavirus 3/26/2021       PAST SURGICAL HISTORY    PAST SURGICAL HISTORY:   Past Surgical History:   Procedure Laterality Date    ARTHROSCOPY KNEE RT/LT Bilateral 2006    partial meniscectomy    PHACOEMULSIFICATION WITH STANDARD INTRAOCULAR LENS  IMPLANT Left 3/6/2019    Procedure: Cataract Removal with Implant;  Surgeon: Johan Anthony MD;  Location: [facility]    PHACOEMULSIFICATION WITH STANDARD INTRAOCULAR LENS IMPLANT Right 4/28/2021    Procedure: Cataract extraction with implant.;  Surgeon: Johan Anthony MD;  Location: [facility]       FAMILY  HISTORY    FAMILY HISTORY:   Family History   Problem Relation Age of Onset    Hypertension Mother     Hypertension Father        SOCIAL HISTORY    SOCIAL HISTORY:   Social History     Tobacco Use    Smoking status: Never    Smokeless tobacco: Never   Substance Use Topics    Alcohol use: Yes     Comment: rare        PHYSICAL EXAM    HEAD: Normal appearance and symmetry:  No cutaneous lesions.      NECK:  supple     EARS:  Auricles normal without lesions    EYES:  EOMI    CN VII/XII:  intact     NOSE:  patent        ORAL CAVITY/OROPHARYNX:     Lips:  Normal.  Tongue: normal, midline  Mucosa:   no lesions     NECK:  Trachea:  midline.        NEURO:   Alert and Oriented        RESPIRATORY:   Symmetry and Respiratory effort     PSYCH:  Normal mood and affect     SKIN:   warm and dry         IMPRESSION:    Encounter Diagnosis   Name Primary?    Ear fullness, bilateral Yes          RECOMMENDATIONS:    Patient with ear fullness, improved since last vist.   Dizziness has improved with balance therapy.  Return annually for hearing test (September 2025)

## 2025-01-02 DIAGNOSIS — G25.81 RESTLESS LEGS SYNDROME (RLS): ICD-10-CM

## 2025-01-02 RX ORDER — PRAMIPEXOLE DIHYDROCHLORIDE 0.25 MG/1
0.25 TABLET ORAL 3 TIMES DAILY
Qty: 90 TABLET | Refills: 4 | Status: SHIPPED | OUTPATIENT
Start: 2025-01-02

## 2025-03-12 ENCOUNTER — OFFICE VISIT (OUTPATIENT)
Dept: FAMILY MEDICINE | Facility: CLINIC | Age: 75
End: 2025-03-12
Payer: COMMERCIAL

## 2025-03-12 VITALS
WEIGHT: 180.13 LBS | HEIGHT: 66 IN | DIASTOLIC BLOOD PRESSURE: 76 MMHG | HEART RATE: 75 BPM | SYSTOLIC BLOOD PRESSURE: 128 MMHG | TEMPERATURE: 98.4 F | OXYGEN SATURATION: 98 % | BODY MASS INDEX: 28.95 KG/M2

## 2025-03-12 DIAGNOSIS — G20.C PARKINSONISM, UNSPECIFIED PARKINSONISM TYPE (H): ICD-10-CM

## 2025-03-12 DIAGNOSIS — E83.00 LOW CERULOPLASMIN LEVEL (H): ICD-10-CM

## 2025-03-12 DIAGNOSIS — B35.6 TINEA CRURIS: Primary | ICD-10-CM

## 2025-03-12 PROCEDURE — 99213 OFFICE O/P EST LOW 20 MIN: CPT | Performed by: FAMILY MEDICINE

## 2025-03-12 PROCEDURE — 3078F DIAST BP <80 MM HG: CPT | Performed by: FAMILY MEDICINE

## 2025-03-12 PROCEDURE — 1126F AMNT PAIN NOTED NONE PRSNT: CPT | Performed by: FAMILY MEDICINE

## 2025-03-12 PROCEDURE — 3074F SYST BP LT 130 MM HG: CPT | Performed by: FAMILY MEDICINE

## 2025-03-12 RX ORDER — NYSTATIN 100000 U/G
CREAM TOPICAL 2 TIMES DAILY
Qty: 30 G | Refills: 1 | Status: SHIPPED | OUTPATIENT
Start: 2025-03-12

## 2025-03-12 ASSESSMENT — PAIN SCALES - GENERAL: PAINLEVEL_OUTOF10: NO PAIN (0)

## 2025-03-12 NOTE — PROGRESS NOTES
"  Assessment & Plan     Tinea cruris  No obvious penile, scrotal or testicular abnormalities.  Treat with topical nystatin.  If itch does not improve consider scrotal ultrasound.  - nystatin (MYCOSTATIN) 518349 UNIT/GM external cream; Apply topically 2 times daily.    Known higher risk conditions that I take into account for medical decision making:   Low ceruloplasmin level (H)  Stable.  Follows with neurology    Parkinsonism, unspecified Parkinsonism type (H)  Stable.  Follows with neurology          BMI  Estimated body mass index is 29.07 kg/m  as calculated from the following:    Height as of this encounter: 1.676 m (5' 6\").    Weight as of this encounter: 81.7 kg (180 lb 2 oz).             Migel Sandoval is a 74 year old, presenting for the following health issues:  Rash        3/12/2025     9:11 AM   Additional Questions   Roomed by Angeles TOMPKINS CMA   Accompanied by Self     History of Present Illness       Reason for visit:  Jock itch  Symptom onset:  More than a month   He is taking medications regularly.          Rash  Onset/Duration: Months  Description  Location: Groin area  Character: blotchy, draining, red  Itching: severe  Intensity:  moderate  Progression of Symptoms:  waxing and waning  Accompanying signs and symptoms:   Fever: No  Body aches or joint pain: No  Sore throat symptoms: No  Recent cold symptoms: No  History:           Previous episodes of similar rash: None  New exposures:  None  Recent travel: No  Exposure to similar rash: No  Precipitating or alleviating factors: None  Therapies tried and outcome: OTC antifungal cream          Objective    /76   Pulse 75   Temp 98.4  F (36.9  C) (Tympanic)   Ht 1.676 m (5' 6\")   Wt 81.7 kg (180 lb 2 oz)   SpO2 98%   BMI 29.07 kg/m    Body mass index is 29.07 kg/m .  Physical Exam   GENERAL: Pleasant, well appearing male.  : Stigmata of excoriations on scrotum.  No palpable scrotal/testicular masses, varicoceles or hydroceles.        "     Signed Electronically by: Adalberto Gautam MD

## 2025-04-07 ENCOUNTER — TRANSFERRED RECORDS (OUTPATIENT)
Dept: HEALTH INFORMATION MANAGEMENT | Facility: CLINIC | Age: 75
End: 2025-04-07
Payer: COMMERCIAL

## 2025-05-02 ENCOUNTER — HOSPITAL ENCOUNTER (OUTPATIENT)
Dept: ULTRASOUND IMAGING | Facility: CLINIC | Age: 75
Discharge: HOME | End: 2025-05-02
Payer: COMMERCIAL

## 2025-05-02 DIAGNOSIS — N50.89 TESTICULAR NODULE: ICD-10-CM

## 2025-05-02 PROCEDURE — 93976 VASCULAR STUDY: CPT

## 2025-05-22 DIAGNOSIS — G20.C PARKINSONISM, UNSPECIFIED PARKINSONISM TYPE (H): ICD-10-CM

## 2025-05-22 RX ORDER — CARBIDOPA AND LEVODOPA 25; 100 MG/1; MG/1
TABLET ORAL
Qty: 360 TABLET | Refills: 1 | Status: SHIPPED | OUTPATIENT
Start: 2025-05-22

## 2025-05-22 NOTE — TELEPHONE ENCOUNTER
Prescription approved per [facility] Refill Protocol.    Cynthia Witt RN, BSN  [facility]

## 2025-06-10 ENCOUNTER — TELEPHONE (OUTPATIENT)
Dept: FAMILY MEDICINE | Facility: CLINIC | Age: 75
End: 2025-06-10
Payer: COMMERCIAL

## 2025-06-10 DIAGNOSIS — M25.579 PAIN IN JOINT INVOLVING ANKLE AND FOOT, UNSPECIFIED LATERALITY: ICD-10-CM

## 2025-06-10 DIAGNOSIS — G25.81 RESTLESS LEGS SYNDROME (RLS): Primary | ICD-10-CM

## 2025-06-10 NOTE — TELEPHONE ENCOUNTER
Received call from Patient.  States that the last 2-3 weeks his restless legs have been really bad at night.  Last night patient does not think that he slept at all.  Taking mirapex as ordered.  Just can not get comfortable at night.  Tried heating pad last night, felt good at first then it didn't.  Tried ibuprofen and only slightly helped.  Does not like taking Ibuprofen as it upsets his stomach.  Patient also has DX of osteoarthritis and is wondering if this could be adding to the problem.  Patient wondering if there is anything else he can take to help with the pain.  Jabari GUERRERO, Clinic RN   [facility]

## 2025-06-11 ENCOUNTER — LAB (OUTPATIENT)
Dept: LAB | Facility: CLINIC | Age: 75
End: 2025-06-11
Payer: COMMERCIAL

## 2025-06-11 ENCOUNTER — RESULTS FOLLOW-UP (OUTPATIENT)
Dept: FAMILY MEDICINE | Facility: CLINIC | Age: 75
End: 2025-06-11

## 2025-06-11 DIAGNOSIS — M25.579 PAIN IN JOINT INVOLVING ANKLE AND FOOT, UNSPECIFIED LATERALITY: ICD-10-CM

## 2025-06-11 DIAGNOSIS — Z12.5 SCREENING FOR PROSTATE CANCER: ICD-10-CM

## 2025-06-11 DIAGNOSIS — G25.81 RESTLESS LEGS SYNDROME (RLS): ICD-10-CM

## 2025-06-11 LAB
BASOPHILS # BLD AUTO: 0 10E3/UL (ref 0–0.2)
BASOPHILS NFR BLD AUTO: 0 %
EOSINOPHIL # BLD AUTO: 0.1 10E3/UL (ref 0–0.7)
EOSINOPHIL NFR BLD AUTO: 1 %
ERYTHROCYTE [DISTWIDTH] IN BLOOD BY AUTOMATED COUNT: 11.7 % (ref 10–15)
FERRITIN SERPL-MCNC: 259 NG/ML (ref 31–409)
HCT VFR BLD AUTO: 43.8 % (ref 40–53)
HGB BLD-MCNC: 15.3 G/DL (ref 13.3–17.7)
IMM GRANULOCYTES # BLD: 0 10E3/UL
IMM GRANULOCYTES NFR BLD: 0 %
IRON BINDING CAPACITY (ROCHE): 266 UG/DL (ref 240–430)
IRON SATN MFR SERPL: 42 % (ref 15–46)
IRON SERPL-MCNC: 112 UG/DL (ref 61–157)
LYMPHOCYTES # BLD AUTO: 1.6 10E3/UL (ref 0.8–5.3)
LYMPHOCYTES NFR BLD AUTO: 24 %
MCH RBC QN AUTO: 30.1 PG (ref 26.5–33)
MCHC RBC AUTO-ENTMCNC: 34.9 G/DL (ref 31.5–36.5)
MCV RBC AUTO: 86 FL (ref 78–100)
MONOCYTES # BLD AUTO: 0.6 10E3/UL (ref 0–1.3)
MONOCYTES NFR BLD AUTO: 9 %
NEUTROPHILS # BLD AUTO: 4.4 10E3/UL (ref 1.6–8.3)
NEUTROPHILS NFR BLD AUTO: 66 %
PLATELET # BLD AUTO: 169 10E3/UL (ref 150–450)
PSA SERPL DL<=0.01 NG/ML-MCNC: 1.14 NG/ML (ref 0–6.5)
RBC # BLD AUTO: 5.09 10E6/UL (ref 4.4–5.9)
WBC # BLD AUTO: 6.7 10E3/UL (ref 4–11)

## 2025-06-11 PROCEDURE — 85025 COMPLETE CBC W/AUTO DIFF WBC: CPT

## 2025-06-11 PROCEDURE — 83550 IRON BINDING TEST: CPT | Mod: GZ

## 2025-06-11 PROCEDURE — 36415 COLL VENOUS BLD VENIPUNCTURE: CPT

## 2025-06-11 PROCEDURE — 82728 ASSAY OF FERRITIN: CPT | Mod: GZ

## 2025-06-11 PROCEDURE — 83540 ASSAY OF IRON: CPT | Mod: GZ

## 2025-06-11 PROCEDURE — G0103 PSA SCREENING: HCPCS

## 2025-06-11 NOTE — TELEPHONE ENCOUNTER
Check ferritin and iron levels.  Low iron can exacerbate symptoms and replacement can improve.    Additionally, I would recommend he contact neurology about this.  I would wonder whether there is some overlap with his Parkinson's.  They were the ones that had made initial Mirapex dosing recommendations.  Back when he saw them in August they recommended follow-up in 3 months for recheck.  I do not see that he has done that or has any appointments scheduled with them.  It looks like he no showed his appointment in January.

## 2025-06-11 NOTE — TELEPHONE ENCOUNTER
Call placed to Patient.  Relayed Dr Gautam's message to patient.  Patient verbalized understanding.  Jabari GUERRERO, Clinic RN   [facility]

## 2025-09-01 DIAGNOSIS — G25.81 RESTLESS LEGS SYNDROME (RLS): ICD-10-CM

## 2025-09-02 RX ORDER — PRAMIPEXOLE DIHYDROCHLORIDE 0.25 MG/1
0.25 TABLET ORAL 3 TIMES DAILY
Qty: 90 TABLET | Refills: 4 | Status: SHIPPED | OUTPATIENT
Start: 2025-09-02

## (undated) DEVICE — SOL NACL 0.9% IRRIG 1000ML BOTTLE 07138-09

## (undated) DEVICE — SOL WATER IRRIG 1000ML BOTTLE 07139-09

## (undated) DEVICE — EYE PREP BETADINE 5% SOLUTION 30ML 0065-0411-30

## (undated) DEVICE — EYE SOL BSS 15ML BOTTLE 65079515

## (undated) RX ORDER — TROPICAMIDE 10 MG/ML
SOLUTION/ DROPS OPHTHALMIC
Status: DISPENSED
Start: 2021-04-28

## (undated) RX ORDER — TROPICAMIDE 10 MG/ML
SOLUTION/ DROPS OPHTHALMIC
Status: DISPENSED
Start: 2019-03-06

## (undated) RX ORDER — PHENYLEPHRINE HYDROCHLORIDE 25 MG/ML
SOLUTION/ DROPS OPHTHALMIC
Status: DISPENSED
Start: 2019-03-06

## (undated) RX ORDER — CYCLOPENTOLATE HYDROCHLORIDE 10 MG/ML
SOLUTION/ DROPS OPHTHALMIC
Status: DISPENSED
Start: 2019-03-06